# Patient Record
Sex: FEMALE | Race: WHITE | Employment: UNEMPLOYED | URBAN - METROPOLITAN AREA
[De-identification: names, ages, dates, MRNs, and addresses within clinical notes are randomized per-mention and may not be internally consistent; named-entity substitution may affect disease eponyms.]

---

## 2019-05-18 ENCOUNTER — HOSPITAL ENCOUNTER (EMERGENCY)
Age: 84
Discharge: HOME | End: 2019-05-18
Payer: MEDICARE

## 2019-05-18 ENCOUNTER — APPOINTMENT (OUTPATIENT)
Dept: CT IMAGING | Age: 84
End: 2019-05-18
Payer: MEDICARE

## 2019-05-18 VITALS
TEMPERATURE: 98.3 F | WEIGHT: 195 LBS | OXYGEN SATURATION: 96 % | HEIGHT: 63 IN | DIASTOLIC BLOOD PRESSURE: 84 MMHG | BODY MASS INDEX: 34.55 KG/M2 | HEART RATE: 91 BPM | RESPIRATION RATE: 18 BRPM | SYSTOLIC BLOOD PRESSURE: 122 MMHG

## 2019-05-18 DIAGNOSIS — E86.0 DEHYDRATION: ICD-10-CM

## 2019-05-18 DIAGNOSIS — R10.9 ABDOMINAL CRAMPING: Primary | ICD-10-CM

## 2019-05-18 LAB
A/G RATIO: 1.2 (ref 1.1–2.2)
ALBUMIN SERPL-MCNC: 4 G/DL (ref 3.4–5)
ALP BLD-CCNC: 69 U/L (ref 40–129)
ALT SERPL-CCNC: 9 U/L (ref 10–40)
ANION GAP SERPL CALCULATED.3IONS-SCNC: 13 MMOL/L (ref 3–16)
AST SERPL-CCNC: 12 U/L (ref 15–37)
BACTERIA: ABNORMAL /HPF
BASOPHILS ABSOLUTE: 0.1 K/UL (ref 0–0.2)
BASOPHILS RELATIVE PERCENT: 0.5 %
BILIRUB SERPL-MCNC: 1 MG/DL (ref 0–1)
BILIRUBIN URINE: NEGATIVE
BLOOD, URINE: ABNORMAL
BUN BLDV-MCNC: 24 MG/DL (ref 7–20)
CALCIUM SERPL-MCNC: 8.9 MG/DL (ref 8.3–10.6)
CHLORIDE BLD-SCNC: 101 MMOL/L (ref 99–110)
CLARITY: CLEAR
CO2: 21 MMOL/L (ref 21–32)
COLOR: ABNORMAL
CREAT SERPL-MCNC: 1.1 MG/DL (ref 0.6–1.2)
EOSINOPHILS ABSOLUTE: 0.1 K/UL (ref 0–0.6)
EOSINOPHILS RELATIVE PERCENT: 0.6 %
EPITHELIAL CELLS, UA: ABNORMAL /HPF
GFR AFRICAN AMERICAN: 57
GFR NON-AFRICAN AMERICAN: 47
GLOBULIN: 3.3 G/DL
GLUCOSE BLD-MCNC: 224 MG/DL (ref 70–99)
GLUCOSE URINE: 250 MG/DL
HCT VFR BLD CALC: 46.5 % (ref 36–48)
HEMOGLOBIN: 14.9 G/DL (ref 12–16)
KETONES, URINE: NEGATIVE MG/DL
LEUKOCYTE ESTERASE, URINE: NEGATIVE
LIPASE: 23 U/L (ref 13–60)
LYMPHOCYTES ABSOLUTE: 1.1 K/UL (ref 1–5.1)
LYMPHOCYTES RELATIVE PERCENT: 8.2 %
MCH RBC QN AUTO: 28.5 PG (ref 26–34)
MCHC RBC AUTO-ENTMCNC: 32.1 G/DL (ref 31–36)
MCV RBC AUTO: 88.9 FL (ref 80–100)
MICROSCOPIC EXAMINATION: YES
MONOCYTES ABSOLUTE: 0.8 K/UL (ref 0–1.3)
MONOCYTES RELATIVE PERCENT: 5.8 %
NEUTROPHILS ABSOLUTE: 11.4 K/UL (ref 1.7–7.7)
NEUTROPHILS RELATIVE PERCENT: 84.9 %
NITRITE, URINE: NEGATIVE
PDW BLD-RTO: 15.3 % (ref 12.4–15.4)
PH UA: 5.5 (ref 5–8)
PLATELET # BLD: 230 K/UL (ref 135–450)
PMV BLD AUTO: 8.3 FL (ref 5–10.5)
POTASSIUM SERPL-SCNC: 4.1 MMOL/L (ref 3.5–5.1)
PROTEIN UA: NEGATIVE MG/DL
RBC # BLD: 5.23 M/UL (ref 4–5.2)
RBC UA: ABNORMAL /HPF (ref 0–2)
SODIUM BLD-SCNC: 135 MMOL/L (ref 136–145)
SPECIFIC GRAVITY UA: <=1.005 (ref 1–1.03)
TOTAL PROTEIN: 7.3 G/DL (ref 6.4–8.2)
URINE TYPE: ABNORMAL
UROBILINOGEN, URINE: 0.2 E.U./DL
WBC # BLD: 13.5 K/UL (ref 4–11)
WBC UA: ABNORMAL /HPF (ref 0–5)

## 2019-05-18 PROCEDURE — 2580000003 HC RX 258: Performed by: PHYSICIAN ASSISTANT

## 2019-05-18 PROCEDURE — 74177 CT ABD & PELVIS W/CONTRAST: CPT

## 2019-05-18 PROCEDURE — 83690 ASSAY OF LIPASE: CPT

## 2019-05-18 PROCEDURE — 80053 COMPREHEN METABOLIC PANEL: CPT

## 2019-05-18 PROCEDURE — 96372 THER/PROPH/DIAG INJ SC/IM: CPT

## 2019-05-18 PROCEDURE — 6360000002 HC RX W HCPCS: Performed by: PHYSICIAN ASSISTANT

## 2019-05-18 PROCEDURE — 96361 HYDRATE IV INFUSION ADD-ON: CPT

## 2019-05-18 PROCEDURE — 85025 COMPLETE CBC W/AUTO DIFF WBC: CPT

## 2019-05-18 PROCEDURE — 81001 URINALYSIS AUTO W/SCOPE: CPT

## 2019-05-18 PROCEDURE — 96360 HYDRATION IV INFUSION INIT: CPT

## 2019-05-18 PROCEDURE — 6360000004 HC RX CONTRAST MEDICATION: Performed by: PHYSICIAN ASSISTANT

## 2019-05-18 PROCEDURE — 99284 EMERGENCY DEPT VISIT MOD MDM: CPT

## 2019-05-18 RX ORDER — DICYCLOMINE HYDROCHLORIDE 10 MG/1
10 CAPSULE ORAL
Qty: 40 CAPSULE | Refills: 0 | Status: SHIPPED | OUTPATIENT
Start: 2019-05-18

## 2019-05-18 RX ORDER — DICYCLOMINE HYDROCHLORIDE 10 MG/ML
20 INJECTION INTRAMUSCULAR ONCE
Status: COMPLETED | OUTPATIENT
Start: 2019-05-18 | End: 2019-05-18

## 2019-05-18 RX ORDER — 0.9 % SODIUM CHLORIDE 0.9 %
1000 INTRAVENOUS SOLUTION INTRAVENOUS ONCE
Status: COMPLETED | OUTPATIENT
Start: 2019-05-18 | End: 2019-05-18

## 2019-05-18 RX ADMIN — IOPAMIDOL 75 ML: 755 INJECTION, SOLUTION INTRAVENOUS at 11:54

## 2019-05-18 RX ADMIN — SODIUM CHLORIDE 1000 ML: 9 INJECTION, SOLUTION INTRAVENOUS at 11:08

## 2019-05-18 RX ADMIN — DICYCLOMINE HYDROCHLORIDE 20 MG: 20 INJECTION, SOLUTION INTRAMUSCULAR at 11:08

## 2019-05-18 ASSESSMENT — PAIN DESCRIPTION - LOCATION: LOCATION: ABDOMEN

## 2019-05-18 ASSESSMENT — PAIN SCALES - GENERAL: PAINLEVEL_OUTOF10: 3

## 2019-05-18 ASSESSMENT — PAIN DESCRIPTION - PAIN TYPE: TYPE: ACUTE PAIN

## 2019-05-18 NOTE — ED PROVIDER NOTES
Social Needs    Financial resource strain: Not on file    Food insecurity:     Worry: Not on file     Inability: Not on file    Transportation needs:     Medical: Not on file     Non-medical: Not on file   Tobacco Use    Smoking status: Never Smoker    Smokeless tobacco: Never Used   Substance and Sexual Activity    Alcohol use: No    Drug use: No    Sexual activity: Not Currently     Partners: Male   Lifestyle    Physical activity:     Days per week: Not on file     Minutes per session: Not on file    Stress: Not on file   Relationships    Social connections:     Talks on phone: Not on file     Gets together: Not on file     Attends Shinto service: Not on file     Active member of club or organization: Not on file     Attends meetings of clubs or organizations: Not on file     Relationship status: Not on file    Intimate partner violence:     Fear of current or ex partner: Not on file     Emotionally abused: Not on file     Physically abused: Not on file     Forced sexual activity: Not on file   Other Topics Concern    Not on file   Social History Narrative    Not on file     Current Facility-Administered Medications   Medication Dose Route Frequency Provider Last Rate Last Dose    0.9 % sodium chloride bolus  1,000 mL Intravenous Once Gavi Lemon, [address]        dicyclomine (BENTYL) injection 20 mg  20 mg Intramuscular Once Gavi Lemon, [address]         Current Outpatient Medications   Medication Sig Dispense Refill    glimepiride (AMARYL) 4 MG tablet       metFORMIN (GLUCOPHAGE) 1000 MG tablet       glimepiride (AMARYL) 4 MG tablet Take 4 mg by mouth      metFORMIN (GLUCOPHAGE) 1000 MG tablet Take 1,000 mg by mouth      DULoxetine (CYMBALTA) 60 MG capsule Take 60 mg by mouth      metFORMIN (GLUCOPHAGE) 500 MG tablet Take 1,000 mg by mouth      DULoxetine (CYMBALTA) 60 MG capsule Take 60 mg by mouth      DULoxetine (CYMBALTA) 60 MG capsule   5    magnesium oxide (MAG-OX) 400 (241.3 MG) MG TABS tablet   5    promethazine (PHENERGAN) 12.5 MG tablet   0    Magnesium Oxide, Antacid, 500 MG CAPS Take 400 mg by mouth      glucose blood VI test strips (ASCENSIA AUTODISC VI;ONE TOUCH ULTRA TEST VI) strip USE TO TEST YOUR BLOOD GLUCOSE ONCE DAILY      atorvastatin (LIPITOR) 20 MG tablet Take 20 mg by mouth.  glimepiride (AMARYL) 2 MG tablet Take 2 mg by mouth.  lisinopril (ZESTRIL) 40 MG tablet Take 40 mg by mouth.  metFORMIN (GLUCOPHAGE) 500 MG tablet Take 1,000 mg by mouth.  naproxen (NAPROSYN) 375 MG tablet Take 375 mg by mouth.  atorvastatin (LIPITOR) 20 MG tablet Take 1 tablet by mouth  daily      glimepiride (AMARYL) 2 MG tablet Take 1 tablet by mouth  daily      lisinopril (PRINIVIL;ZESTRIL) 40 MG tablet Take 40 mg by mouth.  metFORMIN (GLUCOPHAGE) 500 MG tablet Take 1,000 mg by mouth.  naproxen (NAPROSYN) 375 MG tablet Take 375 mg by mouth.  atorvastatin (LIPITOR) 20 MG tablet Take 20 mg by mouth daily.  glimepiride (AMARYL) 2 MG tablet Take 2 mg by mouth every morning (before breakfast).  lisinopril (PRINIVIL;ZESTRIL) 40 MG tablet Take 40 mg by mouth daily.  metFORMIN (GLUCOPHAGE) 500 MG tablet Take 500 mg by mouth 2 times daily (with meals).  naproxen (NAPROSYN) 375 MG tablet Take 375 mg by mouth 2 times daily (with meals). No Known Allergies    REVIEW OF SYSTEMS  10 systems reviewed, pertinent positives per HPI otherwise noted to be negative    PHYSICAL EXAM  /74   Pulse 55   Temp 98.3 °F (36.8 °C) (Oral)   Resp 18   Ht 5' 3\" (1.6 m)   Wt 195 lb (88.5 kg)   SpO2 96%   BMI 34.54 kg/m²   GENERAL APPEARANCE: Awake and alert. Cooperative. No acute distress. HEAD: Normocephalic. Atraumatic. EYES: PERRL. EOM's grossly intact. ENT: Mucous membranes are moist.   NECK: Supple. HEART: RRR. No murmurs. LUNGS: Respirations unlabored. CTAB. Good air exchange. Speaking comfortably in full sentences. ABDOMEN: Soft. Non-distended. Generalized nonfocal abdominal tenderness without rigidity, No guarding or rebound. Negative Juarez's, McBurney's and Rovsing's. No fluid wave or ascites. No hernias or masses. Bowel sounds, no quadrants. No CVA tenderness. EXTREMITIES: No peripheral edema. Moves all extremities equally. All extremities neurovascularly intact. SKIN: Warm and dry. No acute rashes. NEUROLOGICAL: Alert and oriented. CN's 2-12 intact. No gross facial drooping. Strength 5/5, sensation intact. PSYCHIATRIC: Normal mood and affect. RADIOLOGY  Ct Abdomen Pelvis W Iv Contrast Additional Contrast? None    Result Date: 5/18/2019  EXAMINATION: CT OF THE ABDOMEN AND PELVIS WITH CONTRAST 5/18/2019 11:49 am TECHNIQUE: CT of the abdomen and pelvis was performed with the administration of intravenous contrast. Multiplanar reformatted images are provided for review. Dose modulation, iterative reconstruction, and/or weight based adjustment of the mA/kV was utilized to reduce the radiation dose to as low as reasonably achievable. COMPARISON: None. HISTORY: ORDERING SYSTEM PROVIDED HISTORY: abd pain TECHNOLOGIST PROVIDED HISTORY: Additional Contrast?->None Ordering Physician Provided Reason for Exam: s/p colonoscopy yesterdy. Pt is now having severe lower abd cramping Acuity: Acute Type of Exam: Initial FINDINGS: Lower Chest: Minimal lung scarring. No pericardial or pleural effusions. Organs: Liver, portal vein, pancreas, spleen and adrenal glands all appear unremarkable. Gallbladder has been removed. A cyst is noted involving the superior pole of the right kidney. No enhancing renal mass or hydronephrosis. Abdominal aorta demonstrates moderate calcification without aneurysm. GI/Bowel: The stomach is grossly unremarkable. Small bowel appears nondilated. Appendix is seen and appears normal.  Sigmoid diverticulosis. Descending colon diverticulosis. No acute colonic abnormality.  Pelvis: Urinary bladder is grossly unremarkable. Uterus appears atrophic. No adnexal mass. Peritoneum/Retroperitoneum: No free air. No free fluid. No lymphadenopathy. Bones/Soft Tissues: Abdominal wall demonstrates no acute findings. Osseous structures demonstrate degenerative changes with mild anterolisthesis of L4 on L5. There appears to be associated posterior calcified disc material within the spinal canal at this level. *No definitive acute abnormality. *Colonic diverticulosis. *Degenerative changes of the visualized osseous structures with mild anterolisthesis of L4 on L5. There appears to be posterior calcified disc material within the spinal canal at this level. Findings can be further evaluated by MRI if necessary. ED COURSE   I have evaluated this patient. Attending physician was available for consultation. Patient received Bentyl IM for pain, with good relief. Triage vital stable. Patient given 1 L IV fluid bolus. Urinalysis 6-10 white blood cells, 3-5 epi cells and rare bacteria. CBC with mild leukocytosis. CMP consistent with mild dehydration with decreased sodium levels. Patient was given a 1 L IV fluid bolus. Lipase normal.  CT abdomen and pelvis without evidence to suggest post procedural complication. Patient is feeling better on reevaluation. Will be discharged home with recommendations for oral hydration and close and continued GI follow-up. Also discussed return precautions and patient in agreement and comfortable discharge. A discussion was had with Mrs. Valdezregarding abdominal discomfort and cramping, ED findings, recommendations for follow-up. All questions were answered. Patient will follow up with  PCP and GI within 1 week for further evaluation/treatment. Patient will return to ED for new/worsening symptoms. Patient was sent home with a prescription for Bentyl .     MDM  Results for orders placed or performed during the hospital encounter of 05/18/19   CBC auto differential   Result Value Ref Range    WBC 13.5 (H) 4.0 - 11.0 K/uL    RBC 5.23 (H) 4.00 - 5.20 M/uL    Hemoglobin 14.9 12.0 - 16.0 g/dL    Hematocrit 46.5 36.0 - 48.0 %    MCV 88.9 80.0 - 100.0 fL    MCH 28.5 26.0 - 34.0 pg    MCHC 32.1 31.0 - 36.0 g/dL    RDW 15.3 12.4 - 15.4 %    Platelets 677 830 - 856 K/uL    MPV 8.3 5.0 - 10.5 fL    Neutrophils % 84.9 %    Lymphocytes % 8.2 %    Monocytes % 5.8 %    Eosinophils % 0.6 %    Basophils % 0.5 %    Neutrophils # 11.4 (H) 1.7 - 7.7 K/uL    Lymphocytes # 1.1 1.0 - 5.1 K/uL    Monocytes # 0.8 0.0 - 1.3 K/uL    Eosinophils # 0.1 0.0 - 0.6 K/uL    Basophils # 0.1 0.0 - 0.2 K/uL   Comprehensive Metabolic Panel   Result Value Ref Range    Sodium 135 (L) 136 - 145 mmol/L    Potassium 4.1 3.5 - 5.1 mmol/L    Chloride 101 99 - 110 mmol/L    CO2 21 21 - 32 mmol/L    Anion Gap 13 3 - 16    Glucose 224 (H) 70 - 99 mg/dL    BUN 24 (H) 7 - 20 mg/dL    CREATININE 1.1 0.6 - 1.2 mg/dL    GFR Non- 47 (A) >60    GFR  57 (A) >60    Calcium 8.9 8.3 - 10.6 mg/dL    Total Protein 7.3 6.4 - 8.2 g/dL    Alb 4.0 3.4 - 5.0 g/dL    Albumin/Globulin Ratio 1.2 1.1 - 2.2    Total Bilirubin 1.0 0.0 - 1.0 mg/dL    Alkaline Phosphatase 69 40 - 129 U/L    ALT 9 (L) 10 - 40 U/L    AST 12 (L) 15 - 37 U/L    Globulin 3.3 g/dL   Lipase   Result Value Ref Range    Lipase 23.0 13.0 - 60.0 U/L   Urinalysis   Result Value Ref Range    Color, UA Straw Straw/Yellow    Clarity, UA Clear Clear    Glucose, Ur 250 (A) Negative mg/dL    Bilirubin Urine Negative Negative    Ketones, Urine Negative Negative mg/dL    Specific Gravity, UA <=1.005 1.005 - 1.030    Blood, Urine TRACE-LYSED (A) Negative    pH, UA 5.5 5.0 - 8.0    Protein, UA Negative Negative mg/dL    Urobilinogen, Urine 0.2 <2.0 E.U./dL    Nitrite, Urine Negative Negative    Leukocyte Esterase, Urine Negative Negative    Microscopic Examination YES     Urine Type Not Specified    Microscopic Urinalysis   Result Value Ref Range    WBC, UA 6-10 (A) 0 - 5 /HPF    RBC, UA 3-5 (A) 0 - 2 /HPF    Epi Cells 3-5 /HPF    Bacteria, UA Rare (A) /HPF     I estimate there is LOW risk for ACUTE APPENDICITIS, BOWEL OBSTRUCTION, CHOLECYSTITIS, DIVERTICULITIS, INCARCERATED HERNIA, PANCREATITIS, or PERFORATED BOWEL or ULCER, thus I consider the discharge disposition reasonable. Also, there is no evidence or peritonitis, sepsis, or toxicity. Roosevelt Lund and I have discussed the diagnosis and risks, and we agree with discharging home to follow-up with their primary doctor. We also discussed returning to the Emergency Department immediately if new or worsening symptoms occur. We have discussed the symptoms which are most concerning (e.g., bloody stool, fever, changing or worsening pain, vomiting) that necessitate immediate return. FINAL Impression  1. Abdominal cramping    2. Dehydration      Blood pressure 122/84, pulse 91, temperature 98.3 °F (36.8 °C), temperature source Oral, resp. rate 18, height 5' 3\" (1.6 m), weight 195 lb (88.5 kg), SpO2 96 %. DISPOSITION  Patient was discharged to home in good condition.          Tori [place]  05/18/19 [id number]

## 2024-03-30 ENCOUNTER — HOSPITAL ENCOUNTER (INPATIENT)
Age: 89
LOS: 1 days | Discharge: NURSING, REHAB OR LONG-TERM CARE | DRG: 064 | End: 2024-04-03
Attending: EMERGENCY MEDICINE | Admitting: INTERNAL MEDICINE
Payer: MEDICARE

## 2024-03-30 ENCOUNTER — APPOINTMENT (OUTPATIENT)
Dept: CT IMAGING | Age: 89
DRG: 064 | End: 2024-03-30
Payer: MEDICARE

## 2024-03-30 ENCOUNTER — APPOINTMENT (OUTPATIENT)
Dept: GENERAL RADIOLOGY | Age: 89
DRG: 064 | End: 2024-03-30
Payer: MEDICARE

## 2024-03-30 DIAGNOSIS — R79.89 ELEVATED TROPONIN: ICD-10-CM

## 2024-03-30 DIAGNOSIS — S09.90XA INJURY OF HEAD, INITIAL ENCOUNTER: Primary | ICD-10-CM

## 2024-03-30 DIAGNOSIS — R42 DIZZINESS: ICD-10-CM

## 2024-03-30 DIAGNOSIS — Z79.01 ANTICOAGULATED BY ANTICOAGULATION TREATMENT: ICD-10-CM
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
## 2024-03-30 PROBLEM — R29.6 FREQUENT FALLS: Status: ACTIVE | Noted: 2024-03-30

## 2024-03-30 LAB
ALBUMIN SERPL-MCNC: 3.8 G/DL (ref 3.4–5)
ALBUMIN/GLOB SERPL: 1.4 {RATIO} (ref 1.1–2.2)
ALP SERPL-CCNC: 60 U/L (ref 40–129)
ALT SERPL-CCNC: 23 U/L (ref 10–40)
ANION GAP SERPL CALCULATED.3IONS-SCNC: 12 MMOL/L (ref 3–16)
APTT BLD: 36.3 SEC (ref 22.7–35.9)
AST SERPL-CCNC: 22 U/L (ref 15–37)
BACTERIA URNS QL MICRO: ABNORMAL /HPF
BASOPHILS # BLD: 0 K/UL (ref 0–0.2)
BASOPHILS NFR BLD: 0.7 %
BILIRUB SERPL-MCNC: 0.8 MG/DL (ref 0–1)
BILIRUB UR QL STRIP.AUTO: NEGATIVE
BUN SERPL-MCNC: 18 MG/DL (ref 7–20)
CALCIUM SERPL-MCNC: 8.9 MG/DL (ref 8.3–10.6)
CHLORIDE SERPL-SCNC: 97 MMOL/L (ref 99–110)
CLARITY UR: CLEAR
CO2 SERPL-SCNC: 27 MMOL/L (ref 21–32)
COLOR UR: YELLOW
CREAT SERPL-MCNC: 1 MG/DL (ref 0.6–1.2)
DEPRECATED RDW RBC AUTO: 16.3 % (ref 12.4–15.4)
EOSINOPHIL # BLD: 0.2 K/UL (ref 0–0.6)
EOSINOPHIL NFR BLD: 3.5 %
GFR SERPLBLD CREATININE-BSD FMLA CKD-EPI: 54 ML/MIN/{1.73_M2}
GLUCOSE BLD-MCNC: 130 MG/DL (ref 70–99)
GLUCOSE BLD-MCNC: 250 MG/DL (ref 70–99)
GLUCOSE SERPL-MCNC: 174 MG/DL (ref 70–99)
GLUCOSE UR STRIP.AUTO-MCNC: 250 MG/DL
HCT VFR BLD AUTO: 41.6 % (ref 36–48)
HGB BLD-MCNC: 13.6 G/DL (ref 12–16)
HGB UR QL STRIP.AUTO: NEGATIVE
INR PPP: 1.24 (ref 0.84–1.16)
KETONES UR STRIP.AUTO-MCNC: NEGATIVE MG/DL
LEUKOCYTE ESTERASE UR QL STRIP.AUTO: ABNORMAL
LYMPHOCYTES # BLD: 0.9 K/UL (ref 1–5.1)
LYMPHOCYTES NFR BLD: 12.5 %
MCH RBC QN AUTO: 30.1 PG (ref 26–34)
MCHC RBC AUTO-ENTMCNC: 32.7 G/DL (ref 31–36)
MCV RBC AUTO: 92.2 FL (ref 80–100)
MONOCYTES # BLD: 0.4 K/UL (ref 0–1.3)
MONOCYTES NFR BLD: 6 %
NEUTROPHILS # BLD: 5.4 K/UL (ref 1.7–7.7)
NEUTROPHILS NFR BLD: 77.3 %
NITRITE UR QL STRIP.AUTO: NEGATIVE
PERFORMED ON: ABNORMAL
PERFORMED ON: ABNORMAL
PH UR STRIP.AUTO: 6 [PH] (ref 5–8)
PLATELET # BLD AUTO: 252 K/UL (ref 135–450)
PMV BLD AUTO: 7.7 FL (ref 5–10.5)
POTASSIUM SERPL-SCNC: 4.7 MMOL/L (ref 3.5–5.1)
PROT SERPL-MCNC: 6.6 G/DL (ref 6.4–8.2)
PROT UR STRIP.AUTO-MCNC: NEGATIVE MG/DL
PROTHROMBIN TIME: 15.6 SEC (ref 11.5–14.8)
RBC # BLD AUTO: 4.51 M/UL (ref 4–5.2)
RBC #/AREA URNS HPF: ABNORMAL /HPF (ref 0–4)
SODIUM SERPL-SCNC: 136 MMOL/L (ref 136–145)
SP GR UR STRIP.AUTO: 1.02 (ref 1–1.03)
TROPONIN, HIGH SENSITIVITY: 31 NG/L (ref 0–14)
TROPONIN, HIGH SENSITIVITY: 31 NG/L (ref 0–14)
TROPONIN, HIGH SENSITIVITY: 32 NG/L (ref 0–14)
UA COMPLETE W REFLEX CULTURE PNL UR: ABNORMAL
UA DIPSTICK W REFLEX MICRO PNL UR: YES
URN SPEC COLLECT METH UR: ABNORMAL
UROBILINOGEN UR STRIP-ACNC: 0.2 E.U./DL
WBC # BLD AUTO: 7 K/UL (ref 4–11)
WBC #/AREA URNS HPF: ABNORMAL /HPF (ref 0–5)

## 2024-03-30 PROCEDURE — 80053 COMPREHEN METABOLIC PANEL: CPT

## 2024-03-30 PROCEDURE — 85610 PROTHROMBIN TIME: CPT

## 2024-03-30 PROCEDURE — 70498 CT ANGIOGRAPHY NECK: CPT

## 2024-03-30 PROCEDURE — 6370000000 HC RX 637 (ALT 250 FOR IP): Performed by: PHYSICIAN ASSISTANT

## 2024-03-30 PROCEDURE — 81001 URINALYSIS AUTO W/SCOPE: CPT

## 2024-03-30 PROCEDURE — 2580000003 HC RX 258: Performed by: HOSPITALIST

## 2024-03-30 PROCEDURE — 93005 ELECTROCARDIOGRAM TRACING: CPT | Performed by: PHYSICIAN ASSISTANT

## 2024-03-30 PROCEDURE — 51798 US URINE CAPACITY MEASURE: CPT

## 2024-03-30 PROCEDURE — 96361 HYDRATE IV INFUSION ADD-ON: CPT

## 2024-03-30 PROCEDURE — 51701 INSERT BLADDER CATHETER: CPT

## 2024-03-30 PROCEDURE — 96375 TX/PRO/DX INJ NEW DRUG ADDON: CPT

## 2024-03-30 PROCEDURE — G0378 HOSPITAL OBSERVATION PER HR: HCPCS

## 2024-03-30 PROCEDURE — 71045 X-RAY EXAM CHEST 1 VIEW: CPT

## 2024-03-30 PROCEDURE — 6370000000 HC RX 637 (ALT 250 FOR IP): Performed by: HOSPITALIST

## 2024-03-30 PROCEDURE — 72125 CT NECK SPINE W/O DYE: CPT

## 2024-03-30 PROCEDURE — 70450 CT HEAD/BRAIN W/O DYE: CPT

## 2024-03-30 PROCEDURE — 85730 THROMBOPLASTIN TIME PARTIAL: CPT

## 2024-03-30 PROCEDURE — 6360000004 HC RX CONTRAST MEDICATION: Performed by: PHYSICIAN ASSISTANT

## 2024-03-30 PROCEDURE — 85025 COMPLETE CBC W/AUTO DIFF WBC: CPT

## 2024-03-30 PROCEDURE — 84484 ASSAY OF TROPONIN QUANT: CPT

## 2024-03-30 PROCEDURE — 99285 EMERGENCY DEPT VISIT HI MDM: CPT

## 2024-03-30 PROCEDURE — 36415 COLL VENOUS BLD VENIPUNCTURE: CPT

## 2024-03-30 RX ORDER — METOPROLOL SUCCINATE 25 MG/1
25 TABLET, EXTENDED RELEASE ORAL DAILY
Status: DISCONTINUED | OUTPATIENT
Start: 2024-03-31 | End: 2024-04-03 | Stop reason: HOSPADM

## 2024-03-30 RX ORDER — INSULIN LISPRO 100 [IU]/ML
0-4 INJECTION, SOLUTION INTRAVENOUS; SUBCUTANEOUS NIGHTLY
Status: DISCONTINUED | OUTPATIENT
Start: 2024-03-30 | End: 2024-04-03 | Stop reason: HOSPADM

## 2024-03-30 RX ORDER — ENOXAPARIN SODIUM 100 MG/ML
40 INJECTION SUBCUTANEOUS DAILY
Status: DISCONTINUED | OUTPATIENT
Start: 2024-03-30 | End: 2024-03-30

## 2024-03-30 RX ORDER — SODIUM CHLORIDE 9 MG/ML
INJECTION, SOLUTION INTRAVENOUS CONTINUOUS
Status: DISCONTINUED | OUTPATIENT
Start: 2024-03-30 | End: 2024-03-31

## 2024-03-30 RX ORDER — BUPROPION HYDROCHLORIDE 150 MG/1
300 TABLET ORAL EVERY MORNING
Status: DISCONTINUED | OUTPATIENT
Start: 2024-03-31 | End: 2024-04-03 | Stop reason: HOSPADM

## 2024-03-30 RX ORDER — METOPROLOL SUCCINATE 25 MG/1
25 TABLET, EXTENDED RELEASE ORAL DAILY
Status: ON HOLD | COMMUNITY

## 2024-03-30 RX ORDER — SODIUM CHLORIDE 9 MG/ML
INJECTION, SOLUTION INTRAVENOUS PRN
Status: DISCONTINUED | OUTPATIENT
Start: 2024-03-30 | End: 2024-04-03 | Stop reason: HOSPADM

## 2024-03-30 RX ORDER — POTASSIUM CHLORIDE 7.45 MG/ML
10 INJECTION INTRAVENOUS PRN
Status: DISCONTINUED | OUTPATIENT
Start: 2024-03-30 | End: 2024-04-03 | Stop reason: HOSPADM

## 2024-03-30 RX ORDER — ACETAMINOPHEN 500 MG
1000 TABLET ORAL ONCE
Status: COMPLETED | OUTPATIENT
Start: 2024-03-30 | End: 2024-03-30

## 2024-03-30 RX ORDER — POLYETHYLENE GLYCOL 3350 17 G/17G
17 POWDER, FOR SOLUTION ORAL DAILY PRN
Status: DISCONTINUED | OUTPATIENT
Start: 2024-03-30 | End: 2024-04-03 | Stop reason: HOSPADM

## 2024-03-30 RX ORDER — ALENDRONATE SODIUM 70 MG/1
70 TABLET ORAL
Status: ON HOLD | COMMUNITY

## 2024-03-30 RX ORDER — ONDANSETRON 2 MG/ML
4 INJECTION INTRAMUSCULAR; INTRAVENOUS EVERY 6 HOURS PRN
Status: DISCONTINUED | OUTPATIENT
Start: 2024-03-30 | End: 2024-04-03 | Stop reason: HOSPADM

## 2024-03-30 RX ORDER — ACETAMINOPHEN 325 MG/1
650 TABLET ORAL EVERY 6 HOURS PRN
Status: DISCONTINUED | OUTPATIENT
Start: 2024-03-30 | End: 2024-04-03 | Stop reason: HOSPADM

## 2024-03-30 RX ORDER — MAGNESIUM SULFATE IN WATER 40 MG/ML
2000 INJECTION, SOLUTION INTRAVENOUS PRN
Status: DISCONTINUED | OUTPATIENT
Start: 2024-03-30 | End: 2024-04-03 | Stop reason: HOSPADM

## 2024-03-30 RX ORDER — DEXTROSE MONOHYDRATE 100 MG/ML
INJECTION, SOLUTION INTRAVENOUS CONTINUOUS PRN
Status: DISCONTINUED | OUTPATIENT
Start: 2024-03-30 | End: 2024-04-03 | Stop reason: HOSPADM

## 2024-03-30 RX ORDER — INSULIN GLARGINE 100 [IU]/ML
10 INJECTION, SOLUTION SUBCUTANEOUS NIGHTLY
Status: ON HOLD | COMMUNITY

## 2024-03-30 RX ORDER — SODIUM CHLORIDE 0.9 % (FLUSH) 0.9 %
5-40 SYRINGE (ML) INJECTION EVERY 12 HOURS SCHEDULED
Status: DISCONTINUED | OUTPATIENT
Start: 2024-03-30 | End: 2024-04-03 | Stop reason: HOSPADM

## 2024-03-30 RX ORDER — ATORVASTATIN CALCIUM 10 MG/1
20 TABLET, FILM COATED ORAL DAILY
Status: DISCONTINUED | OUTPATIENT
Start: 2024-03-30 | End: 2024-04-03 | Stop reason: HOSPADM

## 2024-03-30 RX ORDER — INSULIN ASPART 100 [IU]/ML
INJECTION, SUSPENSION SUBCUTANEOUS 2 TIMES DAILY WITH MEALS
Status: ON HOLD | COMMUNITY
End: 2024-04-03 | Stop reason: HOSPADM

## 2024-03-30 RX ORDER — ALBUTEROL SULFATE 90 UG/1
2 AEROSOL, METERED RESPIRATORY (INHALATION) EVERY 6 HOURS PRN
Status: ON HOLD | COMMUNITY

## 2024-03-30 RX ORDER — MECLIZINE HCL 12.5 MG/1
25 TABLET ORAL ONCE
Status: COMPLETED | OUTPATIENT
Start: 2024-03-30 | End: 2024-03-30

## 2024-03-30 RX ORDER — LANOLIN ALCOHOL/MO/W.PET/CERES
3 CREAM (GRAM) TOPICAL DAILY
Status: ON HOLD | COMMUNITY

## 2024-03-30 RX ORDER — DULOXETIN HYDROCHLORIDE 60 MG/1
60 CAPSULE, DELAYED RELEASE ORAL DAILY
Status: DISCONTINUED | OUTPATIENT
Start: 2024-03-31 | End: 2024-04-03 | Stop reason: HOSPADM

## 2024-03-30 RX ORDER — LANOLIN ALCOHOL/MO/W.PET/CERES
3 CREAM (GRAM) TOPICAL NIGHTLY PRN
Status: DISCONTINUED | OUTPATIENT
Start: 2024-03-30 | End: 2024-04-03 | Stop reason: HOSPADM

## 2024-03-30 RX ORDER — ACETAMINOPHEN 500 MG
1000 TABLET ORAL EVERY 6 HOURS PRN
Status: ON HOLD | COMMUNITY

## 2024-03-30 RX ORDER — PHENOL 1.4 %
1 AEROSOL, SPRAY (ML) MUCOUS MEMBRANE
Status: ON HOLD | COMMUNITY

## 2024-03-30 RX ORDER — SODIUM CHLORIDE 0.9 % (FLUSH) 0.9 %
5-40 SYRINGE (ML) INJECTION PRN
Status: DISCONTINUED | OUTPATIENT
Start: 2024-03-30 | End: 2024-04-03 | Stop reason: HOSPADM

## 2024-03-30 RX ORDER — GLUCAGON 1 MG/ML
1 KIT INJECTION PRN
Status: DISCONTINUED | OUTPATIENT
Start: 2024-03-30 | End: 2024-04-03 | Stop reason: HOSPADM

## 2024-03-30 RX ORDER — BUPROPION HYDROCHLORIDE 300 MG/1
300 TABLET ORAL EVERY MORNING
Status: ON HOLD | COMMUNITY

## 2024-03-30 RX ORDER — POTASSIUM CHLORIDE 20 MEQ/1
40 TABLET, EXTENDED RELEASE ORAL PRN
Status: DISCONTINUED | OUTPATIENT
Start: 2024-03-30 | End: 2024-04-03 | Stop reason: HOSPADM

## 2024-03-30 RX ORDER — POLYETHYLENE GLYCOL 3350 17 G/17G
17 POWDER, FOR SOLUTION ORAL DAILY
Status: ON HOLD | COMMUNITY

## 2024-03-30 RX ORDER — ONDANSETRON 4 MG/1
4 TABLET, ORALLY DISINTEGRATING ORAL EVERY 8 HOURS PRN
Status: DISCONTINUED | OUTPATIENT
Start: 2024-03-30 | End: 2024-04-03 | Stop reason: HOSPADM

## 2024-03-30 RX ORDER — ACETAMINOPHEN 650 MG/1
650 SUPPOSITORY RECTAL EVERY 6 HOURS PRN
Status: DISCONTINUED | OUTPATIENT
Start: 2024-03-30 | End: 2024-04-03 | Stop reason: HOSPADM

## 2024-03-30 RX ORDER — INSULIN LISPRO 100 [IU]/ML
0-8 INJECTION, SOLUTION INTRAVENOUS; SUBCUTANEOUS
Status: DISCONTINUED | OUTPATIENT
Start: 2024-03-30 | End: 2024-04-03 | Stop reason: HOSPADM

## 2024-03-30 RX ADMIN — ACETAMINOPHEN 1000 MG: 500 TABLET ORAL at 13:37

## 2024-03-30 RX ADMIN — IOPAMIDOL 75 ML: 755 INJECTION, SOLUTION INTRAVENOUS at 14:59

## 2024-03-30 RX ADMIN — APIXABAN 2.5 MG: 2.5 TABLET, FILM COATED ORAL at 21:28

## 2024-03-30 RX ADMIN — MECLIZINE 25 MG: 12.5 TABLET ORAL at 14:35

## 2024-03-30 RX ADMIN — SODIUM CHLORIDE: 9 INJECTION, SOLUTION INTRAVENOUS at 18:24

## 2024-03-30 ASSESSMENT — PAIN - FUNCTIONAL ASSESSMENT: PAIN_FUNCTIONAL_ASSESSMENT: 0-10

## 2024-03-30 ASSESSMENT — PAIN SCALES - GENERAL
PAINLEVEL_OUTOF10: 1
PAINLEVEL_OUTOF10: 1

## 2024-03-30 ASSESSMENT — PAIN DESCRIPTION - ORIENTATION: ORIENTATION: POSTERIOR

## 2024-03-30 ASSESSMENT — PAIN DESCRIPTION - LOCATION: LOCATION: HEAD

## 2024-03-30 NOTE — H&P
V2.0  History and Physical      Name:  Maria E Valdez /Age/Sex: 8/3/1934  (89 y.o. female)   MRN & CSN:  [id number] & [id number] Encounter Date/Time: 3/30/2024 5:04 PM EDT   Location:  [facility]- PCP: Moises Winslow MD       Hospital Day: 1    Assessment and Plan:       Hospital Problems             Last Modified POA    * (Principal) Dizziness 3/30/2024 Yes    Elevated troponin 3/30/2024 Yes    Frequent falls 3/30/2024 Yes    Hypertension 3/30/2024 Yes    Controlled type 2 diabetes mellitus with hyperglycemia, with long-term current use of insulin (HCC) 3/30/2024 Yes    Diabetic polyneuropathy (HCC) 3/30/2024 Yes    Type 2 diabetes mellitus with diabetic polyneuropathy (HCC) 3/30/2024 Yes    Osteoarthritis 3/30/2024 Yes    Chronic obstructive pulmonary disease (HCC) 3/30/2024 Yes    Overview Signed 2015  1:49 PM by Judy Valentine MA     Overview:   Mild by pfts 10/10            Plan:  Admit to observation status for further management  Telemetry  Fall precaution  Vitals per protocol  Orthostatic vitals ordered  Accu-Chek/sliding scale insulin ordered  Continue some of the home meds including Eliquis  Cardiology consulted for further evaluation  PT/OT consulted  Monitor lab, trend troponin  Full code  Further management based on hospital course and consultant recommendations    Disposition:   Current Living situation: Home  Expected Disposition: Home  Estimated D/C: 2 days    Diet ADULT DIET; Regular; 4 carb choices (60 gm/meal)   DVT Prophylaxis [] Lovenox, []  Heparin, [] SCDs, [] Ambulation,  [x] Eliquis, [] Xarelto, [] Coumadin   Code Status Full Code   Surrogate Decision Maker/ POA yes     Personally reviewed Lab Studies and Imaging     Discussed management of the case with patient verbalized understanding        History from:     patient    History of Present Illness:     Chief Complaint: Dizziness/fall  Maria E Valdez is a 89 y.o. female with pmh of essential hypertension, neurodegenerative  TISSUES/SKULL: The calvarium is intact.  Note is made of a small scalp hematoma overlying the vertex of the skull.     No acute intracranial abnormality.         Electronically signed by Zachary Harmon MD on 3/30/2024 at 6:09 PM

## 2024-03-30 NOTE — CONSULTS
Consult Placed     Who: HOWARD CATHERINE   Date:3/30/24  Time:0558     Electronically signed by Ankit Yoon on 3/30/2024 at 5:57 PM

## 2024-03-30 NOTE — ED NOTES
Isaak Cummings and I went in to ambulate patient. As she sat up in bed she started staring off towards the ceiling stating she was getting dizzy and began laying back. Zoila and I put her back in bed, connected her back to monitor. Wallace RATLIFF updated of all.

## 2024-03-30 NOTE — ED PROVIDER NOTES
[facility]  ED  EMERGENCY DEPARTMENT ENCOUNTER        Pt Name: Maria E Vadlez  MRN: [id number]  Birthdate 8/3/1934  Date of evaluation: 3/30/2024  Provider: Wallace Ac PA-C  PCP: Moises Winslow MD  Note Started: 1:58 PM EDT 3/30/24      Patient was seen in conjunction with attending physician ARPIT KO       CHIEF COMPLAINT       Chief Complaint   Patient presents with    Fall     Patient comes from [place], got dizzy and fell from standing height. Hit head on woodwork in the kitchen on the wall. No LOC, on blood thinners. Contusion to back of head.       HISTORY OF PRESENT ILLNESS: 1 or more Elements     History From: patient  Limitations to history : None    Maria E Valdez is a 89 y.o. female who presents to the emergency department with a chief complaint of head injury.  She states she has minor headache where she hit her head at 1 out of 10.  She is anticoagulated on Eliquis.  She states she was turning to put the door stop on her door when she got dizzy and lost her balance falling hitting the door frame.  She is been able to ambulate since this happened.  No longer feeling dizzy.  Denies neck pain, back pain, difficulty moving her extremities, chest pain, abdominal pain, bleeding, visual change or any other symptoms.    Nursing Notes were all reviewed and agreed with or any disagreements were addressed in the HPI.    REVIEW OF SYSTEMS :      Review of Systems    Positives and Pertinent negatives as per HPI.     SURGICAL HISTORY     Past Surgical History:   Procedure Laterality Date    KNEE SURGERY         CURRENTMEDICATIONS       Previous Medications    ACETAMINOPHEN (TYLENOL) 500 MG TABLET    Take 2 tablets by mouth every 6 hours as needed for Pain    ALBUTEROL SULFATE HFA (VENTOLIN HFA) 108 (90 BASE) MCG/ACT INHALER    Inhale 2 puffs into the lungs every 6 hours as needed for Wheezing    ALENDRONATE (FOSAMAX) 70 MG TABLET    Take 1 tablet by mouth every 7 days     Spontaneous  Best Verbal Response: Oriented  Best Motor Response: Obeys commands  Christina Coma Scale Score: 15                        CIWA Assessment  BP: (!) 154/79  Pulse: 86             PHYSICAL EXAM  1 or more Elements     ED Triage Vitals [03/30/24 1254]   BP Temp Temp Source Pulse Respirations SpO2 Height Weight - Scale   (!) 108/93 98.4 °F (36.9 °C) Oral 85 20 94 % 1.6 m (5' 3\") 90.7 kg (200 lb)       Physical Exam  Vitals and nursing note reviewed.   Constitutional:       Appearance: She is well-developed. She is not diaphoretic.   HENT:      Head:      Comments: Occipital scalp hematoma without crepitus.  No raccoon eyes or Herrera sign.  No CSF rhinorrhea.     Nose: Nose normal.   Eyes:      General:         Right eye: No discharge.         Left eye: No discharge.      Extraocular Movements: Extraocular movements intact.      Pupils: Pupils are equal, round, and reactive to light.   Cardiovascular:      Rate and Rhythm: Normal rate and regular rhythm.      Heart sounds: No murmur heard.     No friction rub. No gallop.   Pulmonary:      Effort: Pulmonary effort is normal. No respiratory distress.      Breath sounds: No stridor. No wheezing, rhonchi or rales.   Abdominal:      General: Bowel sounds are normal. There is no distension.      Palpations: Abdomen is soft. There is no mass.      Tenderness: There is no abdominal tenderness. There is no guarding or rebound.      Hernia: No hernia is present.   Musculoskeletal:         General: No swelling. Normal range of motion.      Cervical back: Normal range of motion. No rigidity.      Comments: No midline tenderness or step-off in the neck or back.  Gross full range of motion throughout all 4 extremities   Skin:     General: Skin is warm and dry.      Findings: No erythema or rash.   Neurological:      Mental Status: She is alert and oriented to person, place, and time.      Cranial Nerves: No cranial nerve deficit.   Psychiatric:         Behavior: Behavior

## 2024-03-30 NOTE — ED PROVIDER NOTES
THIS IS MY RUTHANN SUPERVISORY AND SHARED VISIT NOTE:    I personally saw the patient and made/approved the management plan and take responsibility for the patient management.    History: 89-year-old female presenting for evaluation of fall, she states that she became dizzy and fell from a standing height, fell and hit her head onto the door frame.  No loss of consciousness.  She is on anticoagulation.  She states that she has previously had vertigo.  This feels similar.  No chest pain.    Exam: There is no focal neurological deficit.  NIH stroke scale score 0.  No weakness to extremities.  No difficulty breathing.  Intact distal pulses.    MDM: 89-year-old female presenting for evaluation of fall, became dizzy during the fall.  Sounds like she likely has peripheral vertigo, she does not have persistent dizziness or focal neurological deficits that is overtly concerning for posterior circulation stroke however this cannot be ruled out.    I personally reviewed CTA imaging and did not reveal any large vessel occlusion.  Initial troponin is mildly elevated.  There is no ischemic changes on EKG.  There is no traumatic injury appreciated on CT head imaging.  Patient was getting ready to leave however had another bout of dizziness and could not ambulate.  This is when CT imaging was added on.  Patient may require admission because of elevated troponin and continued dizziness, difficulty ambulating.    I personally saw the patient and independently provided 0 minutes of non-concurrent critical care out of the total shared critical care time provided.     EKG  The Ekg interpreted by myself in the emergency department in the absence of a cardiologist.  atrial fibrillation with a rate of 81  Axis is   Left axis deviation  QTc is  within an acceptable range  Incomplete RBBB   No specific ST-T wave changes appreciated.  No evidence of acute ischemia.   No prior EKG for comparison    No results found.      I, Dr. Dixon, am the

## 2024-03-30 NOTE — PLAN OF CARE
Problem: Musculoskeletal - Adult  Goal: Return mobility to safest level of function  Outcome: Progressing  Flowsheets (Taken 3/30/2024 1953)  Return Mobility to Safest Level of Function:   Assess patient stability and activity tolerance for standing, transferring and ambulating with or without assistive devices   Assist with transfers and ambulation using safe patient handling equipment as needed   Ensure adequate protection for wounds/incisions during mobilization   Obtain physical therapy/occupational therapy consults as needed   Instruct patient/family in ordered activity level     Problem: Musculoskeletal - Adult  Goal: Return ADL status to a safe level of function  Outcome: Progressing  Flowsheets (Taken 3/30/2024 1953)  Return ADL Status to a Safe Level of Function:   Administer medication as ordered   Assess activities of daily living deficits and provide assistive devices as needed   Obtain physical therapy/occupational therapy consults as needed   Assist and instruct patient to increase activity and self care as tolerated     Problem: Metabolic/Fluid and Electrolytes - Adult  Goal: Glucose maintained within prescribed range  Outcome: Progressing  Flowsheets (Taken 3/30/2024 1953)  Glucose maintained within prescribed range:   Monitor blood glucose as ordered   Assess for signs and symptoms of hyperglycemia and hypoglycemia   Administer ordered medications to maintain glucose within target range   Assess barriers to adequate nutritional intake and initiate nutrition consult as needed   Instruct patient on self management of diabetes and initiate consult as needed

## 2024-03-31 LAB
ANION GAP SERPL CALCULATED.3IONS-SCNC: 9 MMOL/L (ref 3–16)
BUN SERPL-MCNC: 15 MG/DL (ref 7–20)
CALCIUM SERPL-MCNC: 8.7 MG/DL (ref 8.3–10.6)
CHLORIDE SERPL-SCNC: 99 MMOL/L (ref 99–110)
CO2 SERPL-SCNC: 26 MMOL/L (ref 21–32)
CREAT SERPL-MCNC: 0.8 MG/DL (ref 0.6–1.2)
DEPRECATED RDW RBC AUTO: 16.5 % (ref 12.4–15.4)
EKG ATRIAL RATE: 288 BPM
EKG DIAGNOSIS: NORMAL
EKG Q-T INTERVAL: 334 MS
EKG QRS DURATION: 92 MS
EKG QTC CALCULATION (BAZETT): 387 MS
EKG R AXIS: -44 DEGREES
EKG T AXIS: -74 DEGREES
EKG VENTRICULAR RATE: 81 BPM
GFR SERPLBLD CREATININE-BSD FMLA CKD-EPI: 70 ML/MIN/{1.73_M2}
GLUCOSE BLD-MCNC: 140 MG/DL (ref 70–99)
GLUCOSE BLD-MCNC: 190 MG/DL (ref 70–99)
GLUCOSE BLD-MCNC: 204 MG/DL (ref 70–99)
GLUCOSE BLD-MCNC: 213 MG/DL (ref 70–99)
GLUCOSE SERPL-MCNC: 142 MG/DL (ref 70–99)
HCT VFR BLD AUTO: 37.6 % (ref 36–48)
HGB BLD-MCNC: 12.3 G/DL (ref 12–16)
MCH RBC QN AUTO: 30.3 PG (ref 26–34)
MCHC RBC AUTO-ENTMCNC: 32.9 G/DL (ref 31–36)
MCV RBC AUTO: 92.1 FL (ref 80–100)
NT-PROBNP SERPL-MCNC: 2915 PG/ML (ref 0–449)
PERFORMED ON: ABNORMAL
PLATELET # BLD AUTO: 224 K/UL (ref 135–450)
PMV BLD AUTO: 8.4 FL (ref 5–10.5)
POTASSIUM SERPL-SCNC: 3.9 MMOL/L (ref 3.5–5.1)
RBC # BLD AUTO: 4.08 M/UL (ref 4–5.2)
SODIUM SERPL-SCNC: 134 MMOL/L (ref 136–145)
TROPONIN, HIGH SENSITIVITY: 32 NG/L (ref 0–14)
WBC # BLD AUTO: 6.6 K/UL (ref 4–11)

## 2024-03-31 PROCEDURE — 51701 INSERT BLADDER CATHETER: CPT

## 2024-03-31 PROCEDURE — 6360000002 HC RX W HCPCS: Performed by: NURSE PRACTITIONER

## 2024-03-31 PROCEDURE — G0378 HOSPITAL OBSERVATION PER HR: HCPCS

## 2024-03-31 PROCEDURE — 51798 US URINE CAPACITY MEASURE: CPT

## 2024-03-31 PROCEDURE — 6370000000 HC RX 637 (ALT 250 FOR IP): Performed by: HOSPITALIST

## 2024-03-31 PROCEDURE — 99222 1ST HOSP IP/OBS MODERATE 55: CPT | Performed by: INTERNAL MEDICINE

## 2024-03-31 PROCEDURE — 6370000000 HC RX 637 (ALT 250 FOR IP): Performed by: NURSE PRACTITIONER

## 2024-03-31 PROCEDURE — 94640 AIRWAY INHALATION TREATMENT: CPT

## 2024-03-31 PROCEDURE — 93010 ELECTROCARDIOGRAM REPORT: CPT | Performed by: INTERNAL MEDICINE

## 2024-03-31 PROCEDURE — 84484 ASSAY OF TROPONIN QUANT: CPT

## 2024-03-31 PROCEDURE — 36415 COLL VENOUS BLD VENIPUNCTURE: CPT

## 2024-03-31 PROCEDURE — 80048 BASIC METABOLIC PNL TOTAL CA: CPT

## 2024-03-31 PROCEDURE — 85027 COMPLETE CBC AUTOMATED: CPT

## 2024-03-31 PROCEDURE — 96361 HYDRATE IV INFUSION ADD-ON: CPT

## 2024-03-31 PROCEDURE — 51702 INSERT TEMP BLADDER CATH: CPT

## 2024-03-31 PROCEDURE — 83880 ASSAY OF NATRIURETIC PEPTIDE: CPT

## 2024-03-31 PROCEDURE — 87086 URINE CULTURE/COLONY COUNT: CPT

## 2024-03-31 PROCEDURE — 2580000003 HC RX 258: Performed by: HOSPITALIST

## 2024-03-31 RX ORDER — FUROSEMIDE 10 MG/ML
40 INJECTION INTRAMUSCULAR; INTRAVENOUS ONCE
Status: COMPLETED | OUTPATIENT
Start: 2024-03-31 | End: 2024-03-31

## 2024-03-31 RX ORDER — LISINOPRIL 5 MG/1
5 TABLET ORAL DAILY
Status: DISCONTINUED | OUTPATIENT
Start: 2024-03-31 | End: 2024-04-03 | Stop reason: HOSPADM

## 2024-03-31 RX ORDER — MECLIZINE HCL 12.5 MG/1
25 TABLET ORAL 3 TIMES DAILY PRN
Status: DISCONTINUED | OUTPATIENT
Start: 2024-03-31 | End: 2024-04-03 | Stop reason: HOSPADM

## 2024-03-31 RX ORDER — TAMSULOSIN HYDROCHLORIDE 0.4 MG/1
0.4 CAPSULE ORAL DAILY
Status: DISCONTINUED | OUTPATIENT
Start: 2024-03-31 | End: 2024-04-01

## 2024-03-31 RX ADMIN — FUROSEMIDE 40 MG: 10 INJECTION, SOLUTION INTRAMUSCULAR; INTRAVENOUS at 11:57

## 2024-03-31 RX ADMIN — INSULIN LISPRO 2 UNITS: 100 INJECTION, SOLUTION INTRAVENOUS; SUBCUTANEOUS at 13:02

## 2024-03-31 RX ADMIN — APIXABAN 2.5 MG: 2.5 TABLET, FILM COATED ORAL at 19:59

## 2024-03-31 RX ADMIN — DULOXETINE HYDROCHLORIDE 60 MG: 60 CAPSULE, DELAYED RELEASE ORAL at 08:50

## 2024-03-31 RX ADMIN — TIOTROPIUM BROMIDE AND OLODATEROL 2 PUFF: 3.124; 2.736 SPRAY, METERED RESPIRATORY (INHALATION) at 08:26

## 2024-03-31 RX ADMIN — ACETAMINOPHEN 650 MG: 325 TABLET ORAL at 00:48

## 2024-03-31 RX ADMIN — LISINOPRIL 5 MG: 5 TABLET ORAL at 11:57

## 2024-03-31 RX ADMIN — METOPROLOL SUCCINATE 25 MG: 25 TABLET, FILM COATED, EXTENDED RELEASE ORAL at 08:50

## 2024-03-31 RX ADMIN — APIXABAN 2.5 MG: 2.5 TABLET, FILM COATED ORAL at 08:50

## 2024-03-31 RX ADMIN — BUPROPION HYDROCHLORIDE 300 MG: 150 TABLET, EXTENDED RELEASE ORAL at 08:50

## 2024-03-31 RX ADMIN — INSULIN LISPRO 2 UNITS: 100 INJECTION, SOLUTION INTRAVENOUS; SUBCUTANEOUS at 18:38

## 2024-03-31 RX ADMIN — Medication 3 MG: at 23:04

## 2024-03-31 RX ADMIN — SODIUM CHLORIDE, PRESERVATIVE FREE 10 ML: 5 INJECTION INTRAVENOUS at 20:00

## 2024-03-31 RX ADMIN — TAMSULOSIN HYDROCHLORIDE 0.4 MG: 0.4 CAPSULE ORAL at 11:57

## 2024-03-31 RX ADMIN — ATORVASTATIN CALCIUM 20 MG: 10 TABLET, FILM COATED ORAL at 08:50

## 2024-03-31 NOTE — FLOWSHEET NOTE
03/30/24 2100   Vital Signs   Blood Pressure Lying 131/74   Pulse Lying 84 PER MINUTE   Blood Pressure Sitting 147/80   Pulse Sitting 78 PER MINUTE   Blood Pressure Standing 129/62   Pulse Standing 91 PER MINUTE

## 2024-03-31 NOTE — CONSULTS
Perfect serve sent for a stat consult regarding patient admitting, Sent at 08:56 on 03/31/24. Doctor Latonia was contacted.

## 2024-03-31 NOTE — CONSULTS
[facility]   Cardiology Consultation     Date: 3/31/2024  Reason for Consultation: Troponin elevation, dizziness  Consult Requesting Physician: Tena Wong MD     CC: Fall    HPI:   Maria E Valdez is a 89 y.o. female history of falls, permanent atrial fibrillation, hypertension, type 2 diabetes, polyneuropathy, osteoarthritis, COPD.  Patient initially presented for fall.  She has been having multiple falls over the past year, approximately 15 total that she describes as a vertical scrolling or moving like sensation of her visual field that disoriented her and causes her to fall.  She denies weakness in her legs.  Denies room spinning sensation and she remembers having vertigo in the past which she says this is quite different from.  She has occasional dizziness and lightheadedness upon standing but again the sensation she describes as very distinct from that.  Events are often unprovoked, again not related to positional change she can have an upright position or be walking for some time before symptom onset.  She has a short prodrome of the seconds before fall.  No loss of consciousness.  No dysarthria, loss of bowel or bladder.    Review of System:  Complete 10 point ROS performed and negative unless noted in above HPI or below    Prior to Admission medications    Medication Sig Start Date End Date Taking? Authorizing Provider   acetaminophen (TYLENOL) 500 MG tablet Take 2 tablets by mouth every 6 hours as needed for Pain   Yes ProviderMiguel MD   alendronate (FOSAMAX) 70 MG tablet Take 1 tablet by mouth every 7 days   Yes ProviderMiguel MD   glycopyrrolate-formoterol (BEVESPI AEROSPHERE) 9-4.8 MCG/ACT AERO Inhale 2 puffs into the lungs 2 times daily   Yes ProviderMiguel MD   buPROPion (WELLBUTRIN XL) 300 MG extended release tablet Take 1 tablet by mouth every morning   Yes Miguel Pizano MD   apixaban (ELIQUIS) 2.5 MG TABS tablet Take 1 tablet by mouth 2 times daily   Yes  inconsistent with neurocardiogenic or arrhythmogenic etiology   - Denies anginal symptoms, troponin elevation is likely demand mediated, nonischemic EKG, no further ischemic evaluation  - From atrial fibrillation, continue Eliquis 2.5 mg twice daily  - Continue metoprolol 25 mg XL daily  - Consider neurology consultation  - Cardiology will sign off, please reach out with further questions or concerns    NOTE: This report was transcribed using voice recognition software. Every effort was made to ensure accuracy, however, inadvertent computerized transcription errors may be present.     José Miguel Haynes MD  [facility]   Office: [phone]  Fax: [phone]

## 2024-03-31 NOTE — CONSULTS
Neur consult was placed for patient due to reoccuring falls, Consult was called into office and doctor will call back, Consult placed on 3/31/24 at 13:00. Aleksandr PERALTA.

## 2024-03-31 NOTE — PLAN OF CARE
Problem: Discharge Planning  Goal: Discharge to home or other facility with appropriate resources  Outcome: Progressing     Problem: Safety - Adult  Goal: Free from fall injury  Outcome: Progressing     Problem: ABCDS Injury Assessment  Goal: Absence of physical injury  Outcome: Progressing     Problem: Cardiovascular - Adult  Goal: Maintains optimal cardiac output and hemodynamic stability  Outcome: Progressing  Goal: Absence of cardiac dysrhythmias or at baseline  Outcome: Progressing     Problem: Skin/Tissue Integrity - Adult  Goal: Skin integrity remains intact  Outcome: Progressing     Problem: Musculoskeletal - Adult  Goal: Return mobility to safest level of function  Outcome: Progressing  Goal: Return ADL status to a safe level of function  Outcome: Progressing     Problem: Metabolic/Fluid and Electrolytes - Adult  Goal: Electrolytes maintained within normal limits  Outcome: Progressing  Goal: Glucose maintained within prescribed range  Outcome: Progressing

## 2024-03-31 NOTE — PROGRESS NOTES
Hospital Medicine Progress Note      Date of Admission: 3/30/2024  Hospital Day: 2    Chief Admission Complaint:    Fall (Patient comes from [place], got dizzy and fell from standing height. Hit head on woodwork in the kitchen on the wall. No LOC, on blood thinners. Contusion to back of head.)     Subjective:      Patient tells me she was dizzy when she got up at home and then fell. This has never happened before. Before it was more due to tripping over items in her walkway.  She says she received something in the ED and has had no dizziness since. Denies HA, visual changes, C/P, SOB, LUA, Abdominal pain, N/V/D/C.     Bianka ZHOU and I got her up to the chair. She had dizziness upon sitting up that resolved without intervention. She was able to walk to the chair and sit without dizziness.     Presenting Admission History:       Maria E Valdez is a 89 y.o. 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  She states she was eating breakfast today and after that she stood up and felt everything is moving and then she fell and hit back of her head.  She states in the last few days she fell 3 times.  Feels everything moves before she falls.  Denies any loss of consciousness.   Denies any chest pain, shortness of breath, nausea/vomiting or any other complaints.  In ED she was found to have elevated troponin and hospitalist team was contacted for further management.       Assessment/Plan:      Current Principal Problem:  Dizziness    Frequent falls over past month or two possibly 2/2 diabetic polyneuropathy, h/o severe L3-4 and L4-5 lumbar stenosis, and dizziness  Patient declined NS intervention at [facility] for her stenosis  CT head and cervical spine no acute process  CTA H/N mild atherosclerosis, otherwise unremarkable CT angiogram of the head and  neck.  - Continue telemetry monitoring  - Orthostatic vitals wnl  -

## 2024-04-01 ENCOUNTER — APPOINTMENT (OUTPATIENT)
Dept: MRI IMAGING | Age: 89
DRG: 064 | End: 2024-04-01
Payer: MEDICARE

## 2024-04-01 LAB
ANION GAP SERPL CALCULATED.3IONS-SCNC: 9 MMOL/L (ref 3–16)
BACTERIA UR CULT: NORMAL
BASOPHILS # BLD: 0 K/UL (ref 0–0.2)
BASOPHILS NFR BLD: 0.5 %
BUN SERPL-MCNC: 16 MG/DL (ref 7–20)
CALCIUM SERPL-MCNC: 8.8 MG/DL (ref 8.3–10.6)
CHLORIDE SERPL-SCNC: 100 MMOL/L (ref 99–110)
CO2 SERPL-SCNC: 28 MMOL/L (ref 21–32)
CREAT SERPL-MCNC: 1 MG/DL (ref 0.6–1.2)
DEPRECATED RDW RBC AUTO: 16.6 % (ref 12.4–15.4)
EOSINOPHIL # BLD: 0.2 K/UL (ref 0–0.6)
EOSINOPHIL NFR BLD: 2.8 %
GFR SERPLBLD CREATININE-BSD FMLA CKD-EPI: 54 ML/MIN/{1.73_M2}
GLUCOSE BLD-MCNC: 167 MG/DL (ref 70–99)
GLUCOSE BLD-MCNC: 176 MG/DL (ref 70–99)
GLUCOSE BLD-MCNC: 204 MG/DL (ref 70–99)
GLUCOSE BLD-MCNC: 232 MG/DL (ref 70–99)
GLUCOSE SERPL-MCNC: 198 MG/DL (ref 70–99)
HCT VFR BLD AUTO: 37.6 % (ref 36–48)
HGB BLD-MCNC: 12.4 G/DL (ref 12–16)
LYMPHOCYTES # BLD: 1 K/UL (ref 1–5.1)
LYMPHOCYTES NFR BLD: 13.5 %
MAGNESIUM SERPL-MCNC: 1.5 MG/DL (ref 1.8–2.4)
MCH RBC QN AUTO: 30.5 PG (ref 26–34)
MCHC RBC AUTO-ENTMCNC: 33.1 G/DL (ref 31–36)
MCV RBC AUTO: 92.1 FL (ref 80–100)
MONOCYTES # BLD: 0.5 K/UL (ref 0–1.3)
MONOCYTES NFR BLD: 6.6 %
NEUTROPHILS # BLD: 5.9 K/UL (ref 1.7–7.7)
NEUTROPHILS NFR BLD: 76.6 %
PERFORMED ON: ABNORMAL
PLATELET # BLD AUTO: 227 K/UL (ref 135–450)
PMV BLD AUTO: 7.8 FL (ref 5–10.5)
POTASSIUM SERPL-SCNC: 4.3 MMOL/L (ref 3.5–5.1)
RBC # BLD AUTO: 4.08 M/UL (ref 4–5.2)
SODIUM SERPL-SCNC: 137 MMOL/L (ref 136–145)
WBC # BLD AUTO: 7.7 K/UL (ref 4–11)

## 2024-04-01 PROCEDURE — 6370000000 HC RX 637 (ALT 250 FOR IP): Performed by: HOSPITALIST

## 2024-04-01 PROCEDURE — G0378 HOSPITAL OBSERVATION PER HR: HCPCS

## 2024-04-01 PROCEDURE — 36415 COLL VENOUS BLD VENIPUNCTURE: CPT

## 2024-04-01 PROCEDURE — 97535 SELF CARE MNGMENT TRAINING: CPT

## 2024-04-01 PROCEDURE — 97110 THERAPEUTIC EXERCISES: CPT

## 2024-04-01 PROCEDURE — 83735 ASSAY OF MAGNESIUM: CPT

## 2024-04-01 PROCEDURE — 2580000003 HC RX 258: Performed by: HOSPITALIST

## 2024-04-01 PROCEDURE — 80048 BASIC METABOLIC PNL TOTAL CA: CPT

## 2024-04-01 PROCEDURE — 70551 MRI BRAIN STEM W/O DYE: CPT

## 2024-04-01 PROCEDURE — 85025 COMPLETE CBC W/AUTO DIFF WBC: CPT

## 2024-04-01 PROCEDURE — 6370000000 HC RX 637 (ALT 250 FOR IP): Performed by: NURSE PRACTITIONER

## 2024-04-01 PROCEDURE — 96365 THER/PROPH/DIAG IV INF INIT: CPT

## 2024-04-01 PROCEDURE — 96366 THER/PROPH/DIAG IV INF ADDON: CPT

## 2024-04-01 PROCEDURE — 97162 PT EVAL MOD COMPLEX 30 MIN: CPT

## 2024-04-01 PROCEDURE — 97116 GAIT TRAINING THERAPY: CPT

## 2024-04-01 PROCEDURE — 99223 1ST HOSP IP/OBS HIGH 75: CPT | Performed by: PSYCHIATRY & NEUROLOGY

## 2024-04-01 PROCEDURE — 97166 OT EVAL MOD COMPLEX 45 MIN: CPT

## 2024-04-01 PROCEDURE — 6360000002 HC RX W HCPCS: Performed by: HOSPITALIST

## 2024-04-01 PROCEDURE — 94640 AIRWAY INHALATION TREATMENT: CPT

## 2024-04-01 RX ADMIN — ATORVASTATIN CALCIUM 20 MG: 10 TABLET, FILM COATED ORAL at 09:20

## 2024-04-01 RX ADMIN — DULOXETINE HYDROCHLORIDE 60 MG: 60 CAPSULE, DELAYED RELEASE ORAL at 09:20

## 2024-04-01 RX ADMIN — SODIUM CHLORIDE, PRESERVATIVE FREE 10 ML: 5 INJECTION INTRAVENOUS at 21:29

## 2024-04-01 RX ADMIN — APIXABAN 2.5 MG: 2.5 TABLET, FILM COATED ORAL at 21:29

## 2024-04-01 RX ADMIN — TAMSULOSIN HYDROCHLORIDE 0.4 MG: 0.4 CAPSULE ORAL at 09:20

## 2024-04-01 RX ADMIN — MAGNESIUM SULFATE HEPTAHYDRATE 2000 MG: 40 INJECTION, SOLUTION INTRAVENOUS at 09:56

## 2024-04-01 RX ADMIN — BUPROPION HYDROCHLORIDE 300 MG: 150 TABLET, EXTENDED RELEASE ORAL at 09:20

## 2024-04-01 RX ADMIN — METOPROLOL SUCCINATE 25 MG: 25 TABLET, FILM COATED, EXTENDED RELEASE ORAL at 09:21

## 2024-04-01 RX ADMIN — SODIUM CHLORIDE 25 ML: 9 INJECTION, SOLUTION INTRAVENOUS at 09:55

## 2024-04-01 RX ADMIN — TIOTROPIUM BROMIDE AND OLODATEROL 2 PUFF: 3.124; 2.736 SPRAY, METERED RESPIRATORY (INHALATION) at 08:49

## 2024-04-01 RX ADMIN — SODIUM CHLORIDE, PRESERVATIVE FREE 10 ML: 5 INJECTION INTRAVENOUS at 09:21

## 2024-04-01 RX ADMIN — APIXABAN 2.5 MG: 2.5 TABLET, FILM COATED ORAL at 09:21

## 2024-04-01 RX ADMIN — INSULIN LISPRO 2 UNITS: 100 INJECTION, SOLUTION INTRAVENOUS; SUBCUTANEOUS at 18:44

## 2024-04-01 RX ADMIN — LISINOPRIL 5 MG: 5 TABLET ORAL at 09:21

## 2024-04-01 ASSESSMENT — PAIN SCALES - GENERAL
PAINLEVEL_OUTOF10: 0

## 2024-04-01 NOTE — PROGRESS NOTES
Hospital Medicine Progress Note      Date of Admission: 3/30/2024  Hospital Day: 3    Chief Admission Complaint:    Fall (Patient comes from [place], got dizzy and fell from standing height. Hit head on woodwork in the kitchen on the wall. No LOC, on blood thinners. Contusion to back of head.)     Subjective:      Patient resting quietly today. She tells me she is still having dizziness when she attempts to get out of bed and moving. She states that it will go away after about 5 minutes of resting. Has taken no Meclizine.    Presenting Admission History:       Maria E Valdez is a 89 y.o. 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  She states she was eating breakfast today and after that she stood up and felt everything is moving and then she fell and hit back of her head.  She states in the last few days she fell 3 times.  Feels everything moves before she falls.  Denies any loss of consciousness.   Denies any chest pain, shortness of breath, nausea/vomiting or any other complaints.  In ED she was found to have elevated troponin and hospitalist team was contacted for further management.       Assessment/Plan:      Current Principal Problem:  Dizziness    Frequent falls over past month or two possibly 2/2 diabetic polyneuropathy, h/o severe L3-4 and L4-5 lumbar stenosis, and dizziness  Patient declined [facility] intervention at [facility] for her stenosis  CT head and cervical spine no acute process  CTA H/N mild atherosclerosis, otherwise unremarkable CT angiogram of the head and  neck.  MRI brain ordered and pending  - Continue telemetry monitoring  - Orthostatic vitals wnl  - PT/OT to eval and treat. Rec: SNF   - Continue meclizine  - Cardiology consulted; appreciate their input    Diabetes type II, sub-optimally controlled  A1C noted to be 8.3 1/29/24.    - Hold home regimen while admitted, anticipate resuming on

## 2024-04-01 NOTE — PROGRESS NOTES
Physical Therapy  Facility/Department: [place] [id number] - MED SURG  Physical Therapy Initial Assessment/ Treatment    Name: Maria E Valdez  : 8/3/1934  MRN: [id number]  Date of Service: 2024    Discharge Recommendations:  Subacute/Skilled Nursing Facility        Therapy discharge recommendations take into account each patient's current medical complexities and are subject to input/oversight from the patient's healthcare team.   Barriers to Home Discharge:   [] Steps to access home entry or bed/bath:   [x] Unable to transfer, ambulate, or propel wheelchair household distances without assist   [] Limited available assist at home upon discharge    [] Patient or family requests d/c to post-acute facility    [] Poor cognition/safety awareness for d/c to home alone    []Unable to maintain ordered weight bearing status    [] Patient with salient signs of long-standing immobility   [] Patient is at risk for falls due to:   [] Other:    If pt is unable to be seen after this session, please let this note serve as discharge summary.  Please see case management note for discharge disposition.  Thank you.   Patient Diagnosis(es): The primary encounter diagnosis was Injury of head, initial encounter. Diagnoses of Fall, initial encounter, Anticoagulated by anticoagulation treatment, Dizziness, and Elevated troponin were also pertinent to this visit.  Past Medical History:  has a past medical history of Diabetes (HCC) and Osteoarthrosis.  Past Surgical History:  has a past surgical history that includes knee surgery.    Assessment   Body Structures, Functions, Activity Limitations Requiring Skilled Therapeutic Intervention: Decreased functional mobility ;Decreased strength;Decreased endurance;Decreased balance;Decreased safe awareness  Assessment: 89 yo female on OBS with dizziness and fall. PMH: COPD, a fib , HTN, OA, Polyneuropathy, DM2. PLOF: lives alone in assisted living apt, has supervision for bathing, amb indep with RW, has      Education  Patient Education  Education Given To: Patient  Education Provided: Role of Therapy;Plan of Care;Transfer Training  Education Method: Demonstration;Verbal  Barriers to Learning:  (readiness to learn)  Education Outcome: Verbalized understanding;Continued education needed      Therapy Time   Individual Concurrent Group Co-treatment   Time In 1240         Time Out 1315         Minutes 35         Timed Code Treatment Minutes: 23 Minutes       Nicole Curran, PT

## 2024-04-01 NOTE — PLAN OF CARE
Problem: Cardiovascular - Adult  Goal: Maintains optimal cardiac output and hemodynamic stability  3/31/2024 2217 by Gustavo Silveira RN  Outcome: Progressing  Flowsheets (Taken 3/31/2024 2217)  Maintains optimal cardiac output and hemodynamic stability:   Monitor blood pressure and heart rate   Monitor urine output and notify Licensed Independent Practitioner for values outside of normal range   Assess for signs of decreased cardiac output     Problem: Cardiovascular - Adult  Goal: Absence of cardiac dysrhythmias or at baseline  3/31/2024 2217 by Gustavo Silveira RN  Outcome: Progressing  Flowsheets (Taken 3/31/2024 2217)  Absence of cardiac dysrhythmias or at baseline:   Monitor cardiac rate and rhythm   Assess for signs of decreased cardiac output     Problem: Metabolic/Fluid and Electrolytes - Adult  Goal: Electrolytes maintained within normal limits  3/31/2024 2217 by Gustavo Silveira RN  Outcome: Progressing  Flowsheets (Taken 3/31/2024 2217)  Electrolytes maintained within normal limits:   Monitor labs and assess patient for signs and symptoms of electrolyte imbalances   Administer electrolyte replacement as ordered   Monitor response to electrolyte replacements, including repeat lab results as appropriate     Problem: Metabolic/Fluid and Electrolytes - Adult  Goal: Glucose maintained within prescribed range  3/31/2024 2217 by Gustavo Silveira RN  Outcome: Progressing  Flowsheets (Taken 3/30/2024 1953)  Glucose maintained within prescribed range:   Monitor blood glucose as ordered   Assess for signs and symptoms of hyperglycemia and hypoglycemia   Administer ordered medications to maintain glucose within target range   Assess barriers to adequate nutritional intake and initiate nutrition consult as needed   Instruct patient on self management of diabetes and initiate consult as needed

## 2024-04-01 NOTE — PROGRESS NOTES
Occupational Therapy  Facility/Department: [place] [address] - MED SURG  Occupational Therapy Initial Assessment and Treatment    Name: Maria E Valdez  : 8/3/1934  MRN: [id number]  Date of Service: 2024    Discharge Recommendations:  Subacute/Skilled Nursing Facility  OT Equipment Recommendations  Equipment Needed: No  Other: Defer to next level of care     Therapy discharge recommendations are subject to collaboration from the patient’s interdisciplinary healthcare team, including MD and case management recommendations.    Barriers to Home Discharge:   [] Steps to access home entry or bed/bath:   [x] Unable to transfer, ambulate, or propel wheelchair household distances without assist   [] Limited available assist at home upon discharge    [] Patient or family requests d/c to post-acute facility    [x] Poor cognition/safety awareness for d/c to home alone    [] Unable to maintain ordered weight bearing status    [] Patient with salient signs of long-standing immobility   [x] Decreased independence with ADLs   [x] Increased risk for falls   [] Other:    If pt is unable to be seen after this session, please let this note serve as discharge summary.  Please see case management note for discharge disposition.  Thank you.    Patient Diagnosis(es): The primary encounter diagnosis was Injury of head, initial encounter. Diagnoses of Fall, initial encounter, Anticoagulated by anticoagulation treatment, Dizziness, and Elevated troponin were also pertinent to this visit.  Past Medical History:  has a past medical history of Diabetes (HCC) and Osteoarthrosis.  Past Surgical History:  has a past surgical history that includes knee surgery.           Assessment   Performance deficits / Impairments: Decreased functional mobility ;Decreased ADL status;Decreased strength;Decreased safe awareness;Decreased cognition;Decreased posture;Decreased balance;Decreased endurance  Assessment: Pt is 88 yo female presenting from assisted living

## 2024-04-01 NOTE — PLAN OF CARE
Problem: Discharge Planning  Goal: Discharge to home or other facility with appropriate resources  Outcome: Progressing  Flowsheets  Taken 4/1/2024 1039 by Graciela Alvarez  Discharge to home or other facility with appropriate resources: Identify barriers to discharge with patient and caregiver  Taken 4/1/2024 0924 by Rocío Linda RN  Discharge to home or other facility with appropriate resources: Identify barriers to discharge with patient and caregiver     Problem: Safety - Adult  Goal: Free from fall injury  Outcome: Progressing     Problem: ABCDS Injury Assessment  Goal: Absence of physical injury  Outcome: Progressing     Problem: Cardiovascular - Adult  Goal: Maintains optimal cardiac output and hemodynamic stability  4/1/2024 1215 by Graciela Alvarez  Outcome: Progressing  Flowsheets  Taken 4/1/2024 1039 by Graciela Alvarez  Maintains optimal cardiac output and hemodynamic stability: Monitor blood pressure and heart rate  Taken 4/1/2024 0924 by Rocío Linda RN  Maintains optimal cardiac output and hemodynamic stability: Monitor blood pressure and heart rate  3/31/2024 2217 by Gustavo Silveira RN  Outcome: Progressing  Flowsheets (Taken 3/31/2024 2217)  Maintains optimal cardiac output and hemodynamic stability:   Monitor blood pressure and heart rate   Monitor urine output and notify Licensed Independent Practitioner for values outside of normal range   Assess for signs of decreased cardiac output  Goal: Absence of cardiac dysrhythmias or at baseline  4/1/2024 1215 by Graciela Alvarez  Outcome: Progressing  Flowsheets  Taken 4/1/2024 1039 by Graciela Alvarez  Absence of cardiac dysrhythmias or at baseline: Monitor cardiac rate and rhythm  Taken 4/1/2024 0924 by Rocío Linda RN  Absence of cardiac dysrhythmias or at baseline: Monitor cardiac rate and rhythm  3/31/2024 2217 by Gustavo Silveira RN  Outcome: Progressing  Flowsheets (Taken 3/31/2024 2217)  Absence of  cardiac dysrhythmias or at baseline:   Monitor cardiac rate and rhythm   Assess for signs of decreased cardiac output     Problem: Skin/Tissue Integrity - Adult  Goal: Skin integrity remains intact  Outcome: Progressing  Flowsheets  Taken 4/1/2024 1214 by Graciela Alvarez  Skin Integrity Remains Intact: Monitor for areas of redness and/or skin breakdown  Taken 4/1/2024 1039 by Graciela Alvarez  Skin Integrity Remains Intact: Monitor for areas of redness and/or skin breakdown  Taken 4/1/2024 0924 by Rocío Linda RN  Skin Integrity Remains Intact: Monitor for areas of redness and/or skin breakdown     Problem: Musculoskeletal - Adult  Goal: Return mobility to safest level of function  Outcome: Progressing  Flowsheets  Taken 4/1/2024 1039 by Graciela Alvarez  Return Mobility to Safest Level of Function: Assess patient stability and activity tolerance for standing, transferring and ambulating with or without assistive devices  Taken 4/1/2024 0924 by Rocío Linda RN  Return Mobility to Safest Level of Function: Assess patient stability and activity tolerance for standing, transferring and ambulating with or without assistive devices  Goal: Return ADL status to a safe level of function  Outcome: Progressing  Flowsheets  Taken 4/1/2024 1039 by Graciela Alvarez  Return ADL Status to a Safe Level of Function: Administer medication as ordered  Taken 4/1/2024 0924 by Rocío Linda RN  Return ADL Status to a Safe Level of Function: Administer medication as ordered     Problem: Metabolic/Fluid and Electrolytes - Adult  Goal: Electrolytes maintained within normal limits  4/1/2024 1215 by Graciela Alvarez  Outcome: Progressing  Flowsheets  Taken 4/1/2024 1039 by Graciela Alvarez  Electrolytes maintained within normal limits: Administer electrolyte replacement as ordered  Taken 4/1/2024 0924 by Rocío Linda RN  Electrolytes maintained within normal limits: Administer electrolyte replacement

## 2024-04-01 NOTE — CARE COORDINATION
Case Management Assessment  Initial Evaluation    Date/Time of Evaluation: 4/1/2024 1:06 PM  Assessment Completed by: Alba Peñaloza RN    If patient is discharged prior to next notation, then this note serves as note for discharge by case management.    Patient Name: Maria E Valdez                   YOB: 1934  Diagnosis: Dizziness [R42]  Elevated troponin [R79.89]  Anticoagulated by anticoagulation treatment [Z79.01]  Injury of head, initial encounter [S09.90XA]  Fall, initial encounter [W19.XXXA]                   Date / Time: 3/30/2024 12:51 PM    Patient Admission Status: Observation   Readmission Risk (Low < 19, Mod (19-27), High > 27): No data recorded  Current PCP: Moises Winslow MD  PCP verified by ?      Chart Reviewed: Yes      History Provided by: Patient  Patient Orientation: Alert and Oriented, Person, Place, Situation    Patient Cognition: Alert    Hospitalization in the last 30 days (Readmission):  No    If yes, Readmission Assessment in  Navigator will be completed.    Advance Directives:      Code Status: Full Code   Patient's Primary Decision Maker is:        Discharge Planning:    Patient lives with: Other (Comment) Type of Home: Independent Living (came from home alone, was just placed at [place])  Primary Care Giver: Self  Patient Support Systems include: Family Members, Children   Current Financial resources:    Current community resources:    Current services prior to admission: None            Current DME:              Type of Home Care services:  PT, OT    ADLS  Prior functional level:    Current functional level:      PT AM-PAC:   /24  OT AM-PAC:   /24    Family can provide assistance at DC:    Would you like Case Management to discuss the discharge plan with any other family members/significant others, and if so, who?    Plans to Return to Present Housing:    Other Identified Issues/Barriers to RETURNING to current housing:   Potential Assistance needed at discharge:

## 2024-04-01 NOTE — CONSULTS
Nutrition Note    Consult \"HF diet guidelines\". Pt did not answer phone x2, rang busy. Pt currently in observation for recurrent falls and urinary retention. Will provide diet education in D/C handouts if pt to D/C prior to in person diet review.    Electronically signed by Katia Roe RD, LD on 4/1/24 at 1:15 PM EDT    Contact: [phone]

## 2024-04-01 NOTE — PLAN OF CARE
Notified about MRI findings. Patient stable per nurse. Neurology following. Patient on Eliquis and statin.    SHORTY WHALEY MD  4/1/2024  6:50 PM

## 2024-04-01 NOTE — CONSULTS
Neurology consultation note    Patient name: Maria E Valdez      Chief Complaint:  Fall.    History of present illness:  This is an 89 years old right-handed female.  The patient was brought to the hospital yesterday after another episode of fall.  The patient has had 4-5 falls over the last 5 days.  The patient had a similar event in the past the last fall was about few months ago.  The patient also reports spinning sensation and unsteadiness prior to the fall.  But the patient did not loss of consciousness, focal weakness or numbness.  The patient reveals history of bilateral tremor on both hands for years.  The patient also reports intermittent gait difficulty without spinning sensation or lightheadedness.  The patient is noted for chronic diabetes with long-term insulin use.  But the patient denies significant numbness on her feet.    Past medical history:    Past Medical History:   Diagnosis Date    Diabetes (HCC)     Osteoarthrosis 6/20/2013       Past surgical history:    Past Surgical History:   Procedure Laterality Date    KNEE SURGERY          Medication:    Current Facility-Administered Medications   Medication Dose Route Frequency Provider Last Rate Last Admin    meclizine (ANTIVERT) tablet 25 mg  25 mg Oral TID PRN Stella Ybarra APRN - CNP        tamsulosin (FLOMAX) capsule 0.4 mg  0.4 mg Oral Daily Stella Ybarra APRN - CNP   0.4 mg at 03/31/24 1157    lisinopril (PRINIVIL;ZESTRIL) tablet 5 mg  5 mg Oral Daily Stella Ybarra APRN - CNP   5 mg at 03/31/24 1157    sodium chloride flush 0.9 % injection 5-40 mL  5-40 mL IntraVENous 2 times per day Zachary Harmon MD   10 mL at 03/31/24 2000    sodium chloride flush 0.9 % injection 5-40 mL  5-40 mL IntraVENous PRN Zachary Harmon MD        0.9 % sodium chloride infusion   IntraVENous PRN Zachary Harmon MD        potassium chloride (KLOR-CON M) extended release tablet 40 mEq  40 mEq Oral PRN Zachary Harmon  Zachary ACHARYA MD        dextrose bolus 10% 125 mL  125 mL IntraVENous PRN Zachary Harmon MD        Or    dextrose bolus 10% 250 mL  250 mL IntraVENous PRN Zachary Harmon MD        glucagon injection 1 mg  1 mg SubCUTAneous PRN Zachary Harmon MD        dextrose 10 % infusion   IntraVENous Continuous PRZachary Shabazz MD           Allergies:    Allergies as of 03/30/2024    (No Known Allergies)        Social history:     reports that she has never smoked. She has never used smokeless tobacco. She reports that she does not drink alcohol and does not use drugs.     Family history:    Family History   Problem Relation Age of Onset    Cancer Mother     Diabetes Sister     Anesth Problems Neg Hx     Broken Bones Neg Hx     Clotting Disorder Neg Hx     Collagen Disease Neg Hx     Dislocations Neg Hx     Osteoporosis Neg Hx     Rheumatologic Disease Neg Hx     Scoliosis Neg Hx     Severe Sprains Neg Hx         Review of system:  No chest pain, shortness of breath, palpitation, cough, fever, abdominal pain, vomiting, diarrhea, dysuria, vertigo, joint pain, change in speech/vision or new onset of weakness/numbness. Remaining as per HPI.      BP (!) 147/76   Pulse 83   Temp 97.6 °F (36.4 °C) (Oral)   Resp 16   Ht 1.6 m (5' 3\")   Wt 84.2 kg (185 lb 11.2 oz)   SpO2 93%   BMI 32.90 kg/m²       Neurological examination:  MENTAL STATUS:  Alert and oriented to person.  LANG/SPEECH: No dysarthria.  CRANIAL NERVES: No facial asymmetry.  MOTOR: No pronator drift or leg drift.  REFLEXES: 1+ on both legs..  SENSORY: Grossly intact.  COORD: Minimal action tremor and mild degree of cogwheel rigidity.    Imaging, procedure, and laboratory data:   I reviewed the brain imagings.    Assessment:    1.  Frequent falls.  2.  Tremor.  3.  Chronic diabetes.  4.  S/p left total knee replacement.  5.  Chronic anticoagulation use with atrial fibrillation.    The patient has evidence of tremor and mild degree of parkinsonism.  The CTA of  head and neck showed no significant hemodynamic stenosis.  The CT brain also showed no evidence of acute infarction.    From neurology perspective, the patient has multiple risk factors of fall including OA knee, polypharmacy, possible neuropathy, and parkinsonism.    Neurology recommend MRI brain to exclude CVA or vascular parkinsonism.    Plan:    1.  MRI brain without contrast.  2.  Continue Eliquis and statin.  3.  PT/OT/ST.  4.  The target blood pressure below 140/90.  5.  Avoid hypotension.  6.  Continue cardiac telemetry.      75 minutes were spent with the patient with greater than 50% of the time spent in counseling and coordination of care.    Dante Joiner MD

## 2024-04-01 NOTE — PROGRESS NOTES
Physical Therapy:   Orders received, chart reviewed. Pt declined PT at this time. States she just worked with OT and does not want to work. Will try again later today.  Nicole Curran PT

## 2024-04-01 NOTE — DISCHARGE INSTR - DIET
Good nutrition is important when healing from an illness, injury, or surgery.  Follow any nutrition recommendations given to you during your hospital stay.   If you were given an oral nutrition supplement while in the hospital, continue to take this supplement at home.  You can take it with meals, in-between meals, and/or before bedtime. These supplements can be purchased at most local grocery stores, pharmacies, and chain super-stores.   If you have any questions about your diet or nutrition, call the hospital and ask for the dietitian.    Goal Sodium Intake: less than 2000 mg (unless your physician tells you a different number)   Goal Fluid Intake: less than 64 oz (unless your physician tells you a different number)    This nutrition therapy will help you feel better and support your heart.    This plan focuses on:  Limiting sodium in your diet. Salt (sodium) makes your body hold water. When your body holds too much water, you can feel shortness of breath and swelling. You can prevent these symptoms by eating less salt.  Limiting fluid in your diet. For some patients, drinking too much fluid can make heart failure worse. It can cause symptoms such as shortness of breath and swelling. Limiting fluids can help relieve some of your symptoms.    You can achieve these goals by:  Reading food labels to keep track of how much sodium is in the foods you eat.  Limiting foods that are high in sodium.  Checking your weight to make sure you’re not retaining too much fluid.    The nutrition plan for heart failure usually limits the sodium you get from food and drinks to 2,000 milligrams per day. Salt is the main source of sodium. Read the nutrition label to find out how much sodium is in 1 serving of a food.  Select foods with 140 milligrams of sodium or less per serving.  Foods with more than 300 milligrams of sodium per serving may not fit into a reduced-sodium meal plan.  Check serving sizes. If you eat more than 1 serving,  you will get more sodium than the amount listed.    Cutting Back on Sodium  Avoid processed foods. Eat more fresh foods.   Fresh and frozen fruits and vegetables without added juices or sauces are naturally low in sodium.  Fresh meats are lower in sodium than processed meats, such as cobb, sausage, and hot dogs. Read the nutrition label or ask your  to help you find a fresh meat that is low in sodium.  Eat less salt, at the table and when cooking.   Just 1 teaspoon of table salt has 2,300 milligrams of sodium.  Leave the salt out of recipes for pasta, casseroles, and soups.  Be a smart .   Look for food packages that say “salt-free” or “sodium-free.” These items contain less than 5 milligrams of sodium per serving.  “Very-low-sodium” products contain less than 35 milligrams of sodium per serving.  “Low-sodium” products contain less than 140 milligrams of sodium per serving.  “Unsalted” or “no added salt” products may still be high in sodium. Check the nutrition label.  Add flavors to your food without adding sodium.   Try lemon juice, lime juice, fruit juice, or vinegar.  Dry or fresh herbs add flavor. Try basil, bay leaf, dill, rosemary, parsley, lenora, dry mustard, nutmeg, thyme, and paprika.  Pepper, red pepper flakes, and cayenne pepper can add spice to your meals without adding sodium. Hot sauce contains sodium, but if you use just a drop or two, it will not add up to much.  Buy a sodium-free seasoning blend or make your own at home.  Use caution when you eat outside your home.   Restaurant foods can be very high in sodium.  Ask for nutrition information. Many restaurants provide nutrition facts on their menus or websites.  Let your  know that you want your food to be cooked without salt. Ask for your salad dressing and sauces to come “on the side.”    Fluid Restriction  Your doctor may ask you to follow a fluid restriction in addition to taking diuretics (water pills). Ask your doctor how  much fluid you can have. Foods that are liquid at room temperature are considered a fluid, such as popsicles, soup, ice cream, and Jell-O.     Foods Not Recommended  Breads or crackers topped with salt  Prepackaged bread crumbs   Self-rising flours   Canned vegetables (unless they are salt free or low sodium)  Sauerkraut and pickled vegetables   Canned or dried soups (unless they are salt free or low sodium)  French fries, onion rings, and other fried foods   Processed cheeses   Cottage cheese   Cured meats: cobb, ham, sausage, pepperoni, and hot dogs   Canned meats  Smoked fish and meats   Frozen meals (that have more than 600 mg sodium)  Salter butter or margarine   Any type of salt seasoning (sea salt, kosher salt, onion salt, garlic salt, seasoning blends made with salt)  Ketchup, BBQ sauce, soy sauce, Worcestershire sauce  Salsa, pickles, olives, relish  Salad dressings: ranch, blue cheese, Italian, French

## 2024-04-01 NOTE — ACP (ADVANCE CARE PLANNING)
Advance Care Planning     General Advance Care Planning (ACP) Conversation    Date of Conversation: 3/30/2024  Conducted with: Patient with Decision Making Capacity   Healthcare Decision Maker: Next of Kin by law (only applies in absence of above) (name) Suki Tuttle (granddaughter) [phone]    Healthcare Decision Maker:  No healthcare decision makers have been documented.  Click here to complete HealthCare Decision Makers including selection of the Healthcare Decision Maker Relationship (ie \"Primary\")  Today we documented Decision Maker(s) consistent with Legal Next of Kin hierarchy.    Content/Action Overview:  Has NO ACP documents-Information provided  Reviewed DNR/DNI and patient elects Full Code (Attempt Resuscitation)      Length of Voluntary ACP Conversation in minutes:  <16 minutes (Non-Billable)    Alba Peñaloza RN

## 2024-04-01 NOTE — PROGRESS NOTES
4 Eyes Skin Assessment     NAME:  Maria E Valdez  YOB: 1934  MEDICAL RECORD NUMBER:  [id number]    The patient is being assessed for  Admission    I agree that at least one RN has performed a thorough Head to Toe Skin Assessment on the patient. ALL assessment sites listed below have been assessed.      Areas assessed by both nurses:    Head, Face, Ears, Shoulders, Back, Chest, Arms, Elbows, Hands, Sacrum. Buttock, Coccyx, Ischium, Legs. Feet and Heels, and Under Medical Devices         Does the Patient have a Wound? No noted wound(s); scattered bruising       Jonathan Prevention initiated by RN: No  Wound Care Orders initiated by RN: No    Pressure Injury (Stage 3,4, Unstageable, DTI, NWPT, and Complex wounds) if present, place Wound referral order by RN under : No    New Ostomies, if present place, Ostomy referral order under : No     Nurse 1 eSignature: Electronically signed by Rocío Linda RN on 4/1/24 at 7:45 PM EDT    **SHARE this note so that the co-signing nurse can place an eSignature**    Nurse 2 eSignature: Electronically signed by Todd Alvarez RN on 4/1/24 at 10:37 PM EDT

## 2024-04-01 NOTE — CONSULTS
Urology Consult Note      Reason for Consultation: urinary retention    Chief Complaint: \"I did not know I had trouble voiding\"  HPI:  Maria E is a 89 y.o. female with DM, atrial fibrillation on eliquis, COPD, systolic CHF, moderate aortic stenosis who is admitted to [facility] with recurrent falls.  The patient was found to be in urinary retention requiring multiple straight caths for volumes of 400-500 mL of urine, a pollack was subsequently placed on 4/1/24 due to recurrent retention.  The patient is sleepy and not very conversant during history taking, but denies prior urologic issues.      Past Medical History:   Diagnosis Date    Diabetes (HCC)     Osteoarthrosis 6/20/2013       Past Surgical History:   Procedure Laterality Date    KNEE SURGERY         Medication List reviewed:     Current Facility-Administered Medications   Medication Dose Route Frequency Provider Last Rate Last Admin    meclizine (ANTIVERT) tablet 25 mg  25 mg Oral TID PRN Stella Ybarra APRN - CNP        tamsulosin (FLOMAX) capsule 0.4 mg  0.4 mg Oral Daily Stella Ybarra APRN - CNP   0.4 mg at 04/01/24 0920    lisinopril (PRINIVIL;ZESTRIL) tablet 5 mg  5 mg Oral Daily Stella Ybarra APRN - CNP   5 mg at 04/01/24 0921    sodium chloride flush 0.9 % injection 5-40 mL  5-40 mL IntraVENous 2 times per day Zachary Harmon MD   10 mL at 04/01/24 0921    sodium chloride flush 0.9 % injection 5-40 mL  5-40 mL IntraVENous PRN Zachary Harmon MD        0.9 % sodium chloride infusion   IntraVENous PRN Zachary Harmon  mL/hr at 04/01/24 0955 25 mL at 04/01/24 0955    potassium chloride (KLOR-CON M) extended release tablet 40 mEq  40 mEq Oral PRZachary Shabazz MD        Or    potassium bicarb-citric acid (EFFER-K) effervescent tablet 40 mEq  40 mEq Oral PRZachary Shabazz MD        Or    potassium chloride 10 mEq/100 mL IVPB (Peripheral Line)  10 mEq IntraVENous PRZachary Shabazz MD         injection 1 mg  1 mg SubCUTAneous PRN Zachary Harmon MD        dextrose 10 % infusion   IntraVENous Continuous PRN Zachary Harmon MD           No Known Allergies    Family History   Problem Relation Age of Onset    Cancer Mother     Diabetes Sister     Anesth Problems Neg Hx     Broken Bones Neg Hx     Clotting Disorder Neg Hx     Collagen Disease Neg Hx     Dislocations Neg Hx     Osteoporosis Neg Hx     Rheumatologic Disease Neg Hx     Scoliosis Neg Hx     Severe Sprains Neg Hx        Social History     Tobacco Use    Smoking status: Never    Smokeless tobacco: Never   Substance Use Topics    Alcohol use: No    Drug use: No         Review of Systems: A 12 point ROS was performed and was unremarkable unless listed in the history of present illness.      I/O last 3 completed shifts:  In: 960 [P.O.:960]  Out: 2560 [Urine:2560]    Vitals:  /63   Pulse 80   Temp 97.6 °F (36.4 °C) (Oral)   Resp 16   Ht 1.6 m (5' 3\")   Wt 84.2 kg (185 lb 11.2 oz)   SpO2 94%   BMI 32.90 kg/m²   Temp  Av.6 °F (36.4 °C)  Min: 97.2 °F (36.2 °C)  Max: 98 °F (36.7 °C)    Intake/Output Summary (Last 24 hours) at 2024 1029  Last data filed at 2024 1006  Gross per 24 hour   Intake 720 ml   Output 1710 ml   Net -990 ml         Physical:  Well developed, well nourished in no acute distress  Mood indicates no abnormalities. Pt doesn’t appear depressed  Orientated to time and place  Neck is supple, trachea is midline  Respiratory effort is normal  Abdomen soft, not tender, not distended   Skin show no abnormal lesions    Female :   Mark draining yellow urine       Labs:  WBC:    Lab Results   Component Value Date/Time    WBC 7.7 2024 08:25 AM     Hemoglobin/Hematocrit:    Lab Results   Component Value Date/Time    HGB 12.4 2024 08:25 AM    HCT 37.6 2024 08:25 AM     BMP:    Lab Results   Component Value Date/Time     2024 08:25 AM    K 4.3 2024 08:25 AM    K 4.7 2024 01:00

## 2024-04-02 PROBLEM — I50.41 ACUTE COMBINED SYSTOLIC AND DIASTOLIC CONGESTIVE HEART FAILURE (HCC): Status: ACTIVE | Noted: 2024-04-02

## 2024-04-02 PROBLEM — R29.90 STROKE-LIKE SYMPTOMS: Status: ACTIVE | Noted: 2024-04-02

## 2024-04-02 PROBLEM — E78.00 HYPERCHOLESTEROLEMIA: Status: ACTIVE | Noted: 2024-04-02

## 2024-04-02 PROBLEM — I48.21 PERMANENT ATRIAL FIBRILLATION (HCC): Status: ACTIVE | Noted: 2024-04-02

## 2024-04-02 LAB
ANION GAP SERPL CALCULATED.3IONS-SCNC: 12 MMOL/L (ref 3–16)
BASOPHILS # BLD: 0 K/UL (ref 0–0.2)
BASOPHILS NFR BLD: 0.7 %
BUN SERPL-MCNC: 22 MG/DL (ref 7–20)
CALCIUM SERPL-MCNC: 8.9 MG/DL (ref 8.3–10.6)
CHLORIDE SERPL-SCNC: 100 MMOL/L (ref 99–110)
CO2 SERPL-SCNC: 26 MMOL/L (ref 21–32)
CREAT SERPL-MCNC: 1 MG/DL (ref 0.6–1.2)
DEPRECATED RDW RBC AUTO: 17.1 % (ref 12.4–15.4)
EOSINOPHIL # BLD: 0.3 K/UL (ref 0–0.6)
EOSINOPHIL NFR BLD: 4 %
GFR SERPLBLD CREATININE-BSD FMLA CKD-EPI: 54 ML/MIN/{1.73_M2}
GLUCOSE BLD-MCNC: 182 MG/DL (ref 70–99)
GLUCOSE BLD-MCNC: 202 MG/DL (ref 70–99)
GLUCOSE BLD-MCNC: 229 MG/DL (ref 70–99)
GLUCOSE BLD-MCNC: 285 MG/DL (ref 70–99)
GLUCOSE SERPL-MCNC: 192 MG/DL (ref 70–99)
HCT VFR BLD AUTO: 38.9 % (ref 36–48)
HGB BLD-MCNC: 12.5 G/DL (ref 12–16)
LYMPHOCYTES # BLD: 1 K/UL (ref 1–5.1)
LYMPHOCYTES NFR BLD: 14 %
MAGNESIUM SERPL-MCNC: 1.7 MG/DL (ref 1.8–2.4)
MCH RBC QN AUTO: 29.9 PG (ref 26–34)
MCHC RBC AUTO-ENTMCNC: 32.2 G/DL (ref 31–36)
MCV RBC AUTO: 92.8 FL (ref 80–100)
MONOCYTES # BLD: 0.5 K/UL (ref 0–1.3)
MONOCYTES NFR BLD: 6.9 %
NEUTROPHILS # BLD: 5.3 K/UL (ref 1.7–7.7)
NEUTROPHILS NFR BLD: 74.4 %
PERFORMED ON: ABNORMAL
PLATELET # BLD AUTO: 223 K/UL (ref 135–450)
PMV BLD AUTO: 8 FL (ref 5–10.5)
POTASSIUM SERPL-SCNC: 4.4 MMOL/L (ref 3.5–5.1)
RBC # BLD AUTO: 4.19 M/UL (ref 4–5.2)
SODIUM SERPL-SCNC: 138 MMOL/L (ref 136–145)
WBC # BLD AUTO: 7.1 K/UL (ref 4–11)

## 2024-04-02 PROCEDURE — 97530 THERAPEUTIC ACTIVITIES: CPT

## 2024-04-02 PROCEDURE — 92523 SPEECH SOUND LANG COMPREHEN: CPT

## 2024-04-02 PROCEDURE — 85025 COMPLETE CBC W/AUTO DIFF WBC: CPT

## 2024-04-02 PROCEDURE — 99232 SBSQ HOSP IP/OBS MODERATE 35: CPT | Performed by: INTERNAL MEDICINE

## 2024-04-02 PROCEDURE — 94761 N-INVAS EAR/PLS OXIMETRY MLT: CPT

## 2024-04-02 PROCEDURE — 80048 BASIC METABOLIC PNL TOTAL CA: CPT

## 2024-04-02 PROCEDURE — 36415 COLL VENOUS BLD VENIPUNCTURE: CPT

## 2024-04-02 PROCEDURE — 97535 SELF CARE MNGMENT TRAINING: CPT

## 2024-04-02 PROCEDURE — 94640 AIRWAY INHALATION TREATMENT: CPT

## 2024-04-02 PROCEDURE — 6370000000 HC RX 637 (ALT 250 FOR IP): Performed by: NURSE PRACTITIONER

## 2024-04-02 PROCEDURE — 6370000000 HC RX 637 (ALT 250 FOR IP): Performed by: HOSPITALIST

## 2024-04-02 PROCEDURE — 84443 ASSAY THYROID STIM HORMONE: CPT

## 2024-04-02 PROCEDURE — 6370000000 HC RX 637 (ALT 250 FOR IP): Performed by: INTERNAL MEDICINE

## 2024-04-02 PROCEDURE — G0378 HOSPITAL OBSERVATION PER HR: HCPCS

## 2024-04-02 PROCEDURE — 97116 GAIT TRAINING THERAPY: CPT

## 2024-04-02 PROCEDURE — 99233 SBSQ HOSP IP/OBS HIGH 50: CPT | Performed by: PSYCHIATRY & NEUROLOGY

## 2024-04-02 PROCEDURE — 92610 EVALUATE SWALLOWING FUNCTION: CPT

## 2024-04-02 PROCEDURE — 1200000000 HC SEMI PRIVATE

## 2024-04-02 PROCEDURE — 83735 ASSAY OF MAGNESIUM: CPT

## 2024-04-02 PROCEDURE — 94760 N-INVAS EAR/PLS OXIMETRY 1: CPT

## 2024-04-02 PROCEDURE — 2580000003 HC RX 258: Performed by: HOSPITALIST

## 2024-04-02 PROCEDURE — 97110 THERAPEUTIC EXERCISES: CPT

## 2024-04-02 RX ORDER — CHLORHEXIDINE GLUCONATE ORAL RINSE 1.2 MG/ML
15 SOLUTION DENTAL 2 TIMES DAILY
Status: DISCONTINUED | OUTPATIENT
Start: 2024-04-02 | End: 2024-04-03 | Stop reason: HOSPADM

## 2024-04-02 RX ORDER — FUROSEMIDE 20 MG/1
20 TABLET ORAL DAILY
Status: DISCONTINUED | OUTPATIENT
Start: 2024-04-02 | End: 2024-04-03 | Stop reason: HOSPADM

## 2024-04-02 RX ORDER — SPIRONOLACTONE 25 MG/1
25 TABLET ORAL DAILY
Status: DISCONTINUED | OUTPATIENT
Start: 2024-04-02 | End: 2024-04-03 | Stop reason: HOSPADM

## 2024-04-02 RX ORDER — ASPIRIN 81 MG/1
81 TABLET, CHEWABLE ORAL DAILY
Status: DISCONTINUED | OUTPATIENT
Start: 2024-04-02 | End: 2024-04-03 | Stop reason: HOSPADM

## 2024-04-02 RX ADMIN — Medication 3 MG: at 20:55

## 2024-04-02 RX ADMIN — CHLORHEXIDINE GLUCONATE 15 ML: 1.2 RINSE ORAL at 20:55

## 2024-04-02 RX ADMIN — ASPIRIN 81 MG: 81 TABLET, CHEWABLE ORAL at 09:41

## 2024-04-02 RX ADMIN — SODIUM CHLORIDE, PRESERVATIVE FREE 10 ML: 5 INJECTION INTRAVENOUS at 09:04

## 2024-04-02 RX ADMIN — ACETAMINOPHEN 650 MG: 325 TABLET ORAL at 23:26

## 2024-04-02 RX ADMIN — INSULIN LISPRO 4 UNITS: 100 INJECTION, SOLUTION INTRAVENOUS; SUBCUTANEOUS at 18:12

## 2024-04-02 RX ADMIN — SPIRONOLACTONE 25 MG: 25 TABLET ORAL at 18:13

## 2024-04-02 RX ADMIN — ATORVASTATIN CALCIUM 20 MG: 10 TABLET, FILM COATED ORAL at 09:03

## 2024-04-02 RX ADMIN — SODIUM CHLORIDE, PRESERVATIVE FREE 10 ML: 5 INJECTION INTRAVENOUS at 20:55

## 2024-04-02 RX ADMIN — INSULIN LISPRO 2 UNITS: 100 INJECTION, SOLUTION INTRAVENOUS; SUBCUTANEOUS at 09:03

## 2024-04-02 RX ADMIN — BUPROPION HYDROCHLORIDE 300 MG: 150 TABLET, EXTENDED RELEASE ORAL at 09:02

## 2024-04-02 RX ADMIN — LISINOPRIL 5 MG: 5 TABLET ORAL at 09:03

## 2024-04-02 RX ADMIN — DULOXETINE HYDROCHLORIDE 60 MG: 60 CAPSULE, DELAYED RELEASE ORAL at 09:02

## 2024-04-02 RX ADMIN — METOPROLOL SUCCINATE 25 MG: 25 TABLET, FILM COATED, EXTENDED RELEASE ORAL at 09:03

## 2024-04-02 RX ADMIN — FUROSEMIDE 20 MG: 20 TABLET ORAL at 18:13

## 2024-04-02 RX ADMIN — APIXABAN 2.5 MG: 2.5 TABLET, FILM COATED ORAL at 09:03

## 2024-04-02 RX ADMIN — TIOTROPIUM BROMIDE AND OLODATEROL 2 PUFF: 3.124; 2.736 SPRAY, METERED RESPIRATORY (INHALATION) at 07:30

## 2024-04-02 RX ADMIN — APIXABAN 2.5 MG: 2.5 TABLET, FILM COATED ORAL at 20:54

## 2024-04-02 RX ADMIN — MECLIZINE 25 MG: 12.5 TABLET ORAL at 20:56

## 2024-04-02 RX ADMIN — CHLORHEXIDINE GLUCONATE 15 ML: 1.2 RINSE ORAL at 11:07

## 2024-04-02 ASSESSMENT — PAIN SCALES - GENERAL: PAINLEVEL_OUTOF10: 1

## 2024-04-02 ASSESSMENT — PAIN DESCRIPTION - LOCATION: LOCATION: BACK

## 2024-04-02 NOTE — PROGRESS NOTES
[facility] Daily Progress Note      Admit Date:  3/30/2024    Subjective:  Ms. Valdez is seen for cardiology for low EF     [place]  Permanent afib on AC, COPD, HBP HLD reduced LVEF DM Acute dorsal jm CVA this admission  Poor balance and falls. Denies angina shortness of breath palpitation or syncope.  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    ROS:  12 point ROS negative in all areas as listed below except as in [place]  Constitutional, EENT, pulmonary, GI, , Musculoskeletal, skin, hematological, endocrine, Psychiatric    Past Medical History:   Diagnosis Date    Diabetes (HCC)     Osteoarthrosis 6/20/2013     Past Surgical History:   Procedure Laterality Date    KNEE SURGERY         Objective:   /73   Pulse 97   Temp 97.5 °F (36.4 °C) (Oral)   Resp 18   Ht 1.6 m (5' 3\")   Wt 84.5 kg (186 lb 3.2 oz)   SpO2 92%   BMI 32.98 kg/m²     Intake/Output Summary (Last 24 hours) at 4/2/2024 1642  Last data filed at 4/2/2024 0942  Gross per 24 hour   Intake 957 ml   Output 350 ml   Net 607 ml       TELEMETRY: Atrial fibrillation     Physical Exam:  General: No Respiratory distress, appears well developed and well nourished.   Eyes:  Sclera nonicteric  Nose/Sinuses:  negative findings: nose shows no deformity, asymmetry, or inflammation, nasal mucosa normal, septum midline with no perforation or bleeding  Back:  no pain to palpation  Joint:  no active joint inflammation  Musculoskeletal:  negative  Skin:  Warm and dry  Neck:  Negative for JVD and Carotid Bruits.   Chest:  Clear to auscultation, respiration easy  Cardiovascular:  RRR, S1S2 normal, no murmur, no rub or thrill.  Abdomen:  Soft normal liver and spleen  Extremities:   2 + pretibial left 1+ rt pretibial edema,  no clubbing, cyanosis,  Pulses:  pedal pulses are   1+ bilat .  Neuro:  no focal weakness    Medications:    aspirin  81 mg Oral Daily    chlorhexidine  15 mL Mouth/Throat BID    lisinopril  5 mg Oral

## 2024-04-02 NOTE — CONSULTS
Consult Placed     Who: GISELA KELLEY   Date:4/2/2024  Time:1049     Electronically signed by Ravinder Mata on 4/2/2024 at 10:49 AM

## 2024-04-02 NOTE — PROGRESS NOTES
Physical Therapy  Facility/Department: [facility] B3 - MED SURG  Daily Treatment Note  NAME: Maria E Valdez  : 8/3/1934  MRN: [id number]    Date of Service: 2024    Discharge Recommendations:  IP Rehab   PT Equipment Recommendations  Equipment Needed: No    Therapy discharge recommendations take into account each patient's current medical complexities and are subject to input/oversight from the patient's healthcare team.   Barriers to Home Discharge:   [] Steps to access home entry or bed/bath:   [x] Unable to transfer, ambulate, or propel wheelchair household distances without assist   [x] Limited available assist at home upon discharge    [] Patient or family requests d/c to post-acute facility    [x] Poor cognition/safety awareness for d/c to home alone    []Unable to maintain ordered weight bearing status    [] Patient with salient signs of long-standing immobility   [x] Patient is at risk for falls    [] Other:    If pt is unable to be seen after this session, please let this note serve as discharge summary.  Please see case management note for discharge disposition.  Thank you.    Patient Diagnosis(es): The primary encounter diagnosis was Injury of head, initial encounter. Diagnoses of Fall, initial encounter, Anticoagulated by anticoagulation treatment, Dizziness, and Elevated troponin were also pertinent to this visit.    Assessment   Assessment: Pt presents to [facility] for dizziness. Pt presents below baseline for PT eval with decreased strength, decreased endurance, and decreased mobility. Pt presentsPt would continue to benefit from skilled PT to aid in the above deficits and a safe DC IPR/CTA when medically appropriate.  Activity Tolerance: Patient tolerated evaluation without incident;Patient tolerated treatment well  Equipment Needed: No              Plan    Physical Therapy Plan  General Plan: 3-5 times per week  Current Treatment Recommendations: Strengthening;Balance training;Functional mobility  seek provider input and evaluation.  Educated patient in :  []Supine    Level 1: Bed Exercise 10-15 reps, 2x/day  []Ankle Pumps  []Heel Slides  []Hip Abduction  []Buttocks Squeeze  []Diaphragmatic Breathing with TA set  []Shoulder Shrugs  []Bicep Curl  []Hands open/close                          [x]Sitting    Level 2: Seated Exercises 10-15 reps, 2x/day  [x]Toe raise/heel raise  [x]Long Arc Quad  [x]Seated March  [x]Seated Clamshell  []Diaphragmatic Breathing with TA set and BUE ER and IR  []Shoulder Shrugs  []Bicep Curls  []Hands open/close                         []Standing   Level 3: Standing Exercises 10-15 reps, 2x/day     []Sit to/from stand  []Standing march  []Standing side steps  []Standing bilateral heel raise  []Diaphragmatic breathing with TA set and BUE flex/ext  []Shoulder Shrugs  []Bicep Curls  []Hands open/close                        []WalkingLevel 1: Educated patient in initiating walking when in the Green zone and to Start with 2-5 minutes daily and work up to 10 minutes per day. Walk at a slow, comfortable pace with your exertion level Very Light (LEONARD 9) to Light (LEONARD 11)   You should be able to comfortably hold a conversation while walking.        4)Daily Weight check/Stepping on Scale  [x]Pt educated in importance of checking body weight daily and []demonstrate/[x]verbalizes safety in:stepping up to weigh self and complete downward gaze/head nod to read scale on standard scale  and safety stepping off scale.  []Barriers to completion of Daily weight check:       5)Pt voices and demonstrates appropriate teach back of Heart Failure Education Program with : use of the   [x]1. Identifying Appropriate Zone from HF Zone Self-Check Plan                          [x]2. Rating self on LEONARD.  []3.Therapeutic exercise/walking program      [x]4. Importance of taking daily weight measurement             []5. Safely stepping on and off scale    [x]Pt will benefit from reinforcement of education due to

## 2024-04-02 NOTE — PLAN OF CARE
Bedside swallow evaluation completed this date.    Chanda Jc M.S. CCC-SLP  Speech-language pathologist  [id number]      Speech/lang/cog evaluation completed this date.    Chanda Jc M.S. CCC-SLP  Speech-language pathologist  [id number]

## 2024-04-02 NOTE — PROGRESS NOTES
Speech Language Pathology  Clinical Bedside Swallow Assessment  Facility/Department: [facility] B3 - MED SURG        Recommendations:  Diet recommendation: IDDSI 7 Regular Solids; IDDSI 0 Thin Liquids; Meds PO as tolerated  Instrumentation: Pt may benefit from completion of instrumental swallow study to correlate clinical findings, particularly in light of location of pt's acute infarct (R jm).  Limited assessment this date -- will continue to monitor.  Risk management: upright for all intake, stay upright for at least 30 mins after intake, small bites/sips, oral care 2-3x/day to reduce adverse affects in the event of aspiration, increase physical mobility as able, alternate bites/sips, slow rate of intake, general GERD precautions, general aspiration precautions, and hold PO and contact SLP if s/s of aspiration or worsening respiratory status develop.      NAME:Maria E Valdez  : 8/3/1934 (89 y.o.)   MRN: [id number]  ROOM: [address] [place] [address]  ADMISSION DATE: 3/30/2024  PATIENT DIAGNOSIS(ES): Dizziness [R42]  Elevated troponin [R79.89]  Anticoagulated by anticoagulation treatment [Z79.01]  Injury of head, initial encounter [S09.90XA]  Fall, initial encounter [W19.XXXA]  Stroke-like symptoms [R29.90]  Chief Complaint   Patient presents with    Fall     Patient comes from [place], got dizzy and fell from standing height. Hit head on woodwork in the kitchen on the wall. No LOC, on blood thinners. Contusion to back of head.     Patient Active Problem List    Diagnosis Date Noted    Stroke-like symptoms 2024    Dizziness 2024    Elevated troponin 2024    Frequent falls 2024    Acute idiopathic gout of left foot 2016    Anxiety 2016    Restless legs syndrome 11/10/2015    Strain of tibialis anterior muscle 2015    Lumbar strain 2015    Hypertension 2015    Elevated lipids 2015    Controlled type 2 diabetes mellitus with hyperglycemia, with long-term current use of  PO Trials     [] Ice Chips   [] Other:  [x] FEES                                                 Dysphagia Therapeutic Intervention:   []  Bolus control Exercises  []  Oral Motor Exercises  []  Frankel Water Protocol  []  Thermal Stimulation  []  Oral Care    []  Vital Stim/NMES  []  Laryngeal Exercises  [x]  Patient/Family Education  []  Pharyngeal Exercises  []  Therapeutic PO trials with SLP  [x]  Diet tolerance monitoring  []  Other:     Referrals:   [x] ENT    []   [] Pulmonology [] GI  [] Neurology  [] RD  [] OT/ PT  [] N/A    Goals:  Short Term Goals:  Timeframe for Short Term Goals: (5 days, 4/7/24)  Goal 1: The patient will tolerate recommended diet with no clinical s/s of aspiration 5/5  Goal 2: The patient will tolerate instrumental assessment when able   Goal 3: The patient/caregiver will demonstrate understanding of compensatory swallow strategies, for improved swallow safety    Long Term Goals:   Timeframe for Long Term Goals: (7 days, 4/9/24)  Goal 1: The patient will tolerate least restrictive diet with no clinical s/s of aspiration or worsening respiratory/pulmonary status      Treatment:  Skilled instruction completed with patient re: evidenced based practice regarding recommendations and POC, importance of oral care to reduce adverse affects in the event of aspiration, and instruction of recommended compensatory strategies developed based upon clinical exam. Pt able to recall/demonstrate compensatory strategies with (mod) cues.       Pt Education: SLP educated the patient re: Role of SLP, rationale for completion of assessment, results of assessment, recommendations, and POC  Pt Education Response: verbalized understanding, would benefit from ongoing education, and RN aware    Duration/Frequency of Tx: 3-5x/week for LOS    Individuals Consulted:   [x]  Patient     []  NP         [x]  RN   []  RD                   []  MD      []  Family Member                        []  PA    []   Other:      Safety Devices / Report:   [x]  All fall risk precautions in place [x]  Safety handoff completed with RN  [x]  Bed alarm in place  [x]  Left in bed     []  Chair alarm in place  []  Left in chair   [x]  Call light in reach   []  Other:                       Total Treatment Time / Charges       Time in Time out Total Time / units   Swallow Eval/Tx Time  1515 1530 15 min / 1 unit     Signature:  Chanda Jc M.S. CCC-SLP  Speech-language pathologist  [id number]

## 2024-04-02 NOTE — PROGRESS NOTES
[]PPx LMWH  []SQ Heparin  []IPC/SCDs  [x]Eliquis  []Xarelto  []Coumadin  []Other -      Code status: Full Code  PT/OT Eval Status:   []NOT yet ordered  []Ordered and Pending   [x]Seen with Recommendations for:  []Home independently  []Home w/ assist  []HHC  []SNF  [x]Acute Rehab    Anticipated Discharge Day/Date:  4/2/2024    Anticipated Discharge Location: []Home  []HHC  []SNF  [x]Acute Rehab  []ECF  []LTAC  []Hospice  []Other -      Consults:      IP CONSULT TO HOSPITALIST  IP CONSULT TO CARDIOLOGY  IP CONSULT TO HEART FAILURE NURSE/COORDINATOR  IP CONSULT TO DIETITIAN  IP CONSULT TO UROLOGY  IP CONSULT TO NEUROLOGY  IP CONSULT TO CARDIOLOGY  IP CONSULT TO PHYSICAL MEDICINE REHAB      This patient has a high likelihood of being discharged tomorrow and is appropriate for A1 Discharge Unit in AM pending clinical course overnight: [x]Yes  []No    ------------------------------------------------------------------------------------------------------------------------------------------------------------------------    MDM    [x] High (any 2)    A. Problems (any 1)  [x] Acute/Chronic Illness/injury posing threat to life or bodily function:  Acute CVA with frequent falls with dizziness and severe lumbar stenosis  [] Severe exacerbation of chronic illness:    ---------------------------------------------------------------------  B. Risk of Treatment (any 1)   [x] Drugs/treatments that require intensive monitoring for toxicity include:  Insulin  [] Change in code status:    [] Decision to escalate care:    [] Major surgery/procedure with associated risk factors:    ----------------------------------------------------------------------  C. Data (any 2)  [x] Discussed current management and discharge planning options with Case Management.  [] Discussed management of the case with:    [] Telemetry personally reviewed and interpreted as documented above    [x] Imaging personally reviewed and interpreted, includes: CXR  [x] Data  Review (any 3)  [x] Collateral history obtained from:  EMR  [] All available Consultant notes from yesterday/today were reviewed  [x] All current labs were reviewed and interpreted for clinical significance   [x] Appropriate follow-up labs were ordered    Medications:  Personally reviewed in detail in conjunction w/ labs as documented for evidence of drug toxicity.     Infusion Medications    sodium chloride 25 mL (04/01/24 0955)    dextrose       Scheduled Medications    aspirin  81 mg Oral Daily    chlorhexidine  15 mL Mouth/Throat BID    lisinopril  5 mg Oral Daily    sodium chloride flush  5-40 mL IntraVENous 2 times per day    apixaban  2.5 mg Oral BID    atorvastatin  20 mg Oral Daily    buPROPion  300 mg Oral QAM    DULoxetine  60 mg Oral Daily    tiotropium-olodaterol  2 puff Inhalation Daily    metoprolol succinate  25 mg Oral Daily    insulin lispro  0-8 Units SubCUTAneous TID WC    insulin lispro  0-4 Units SubCUTAneous Nightly     PRN Meds: meclizine, sodium chloride flush, sodium chloride, potassium chloride **OR** potassium alternative oral replacement **OR** potassium chloride, magnesium sulfate, ondansetron **OR** ondansetron, polyethylene glycol, acetaminophen **OR** acetaminophen, melatonin, glucose, dextrose bolus **OR** dextrose bolus, glucagon (rDNA), dextrose     Labs:  Personally reviewed and interpreted for clinical significance.     Recent Labs     03/31/24  0608 04/01/24  0825 04/02/24  0600   WBC 6.6 7.7 7.1   HGB 12.3 12.4 12.5   HCT 37.6 37.6 38.9    227 223       Recent Labs     03/31/24  0608 04/01/24  0825 04/02/24  0600   * 137 138   K 3.9 4.3 4.4   CL 99 100 100   CO2 26 28 26   BUN 15 16 22*   CREATININE 0.8 1.0 1.0   CALCIUM 8.7 8.8 8.9   MG  --  1.50* 1.70*       Recent Labs     03/30/24  1915 03/30/24  2142 03/31/24  0931   PROBNP  --   --  2,915*   TROPHS 32* 31* 32*       No results for input(s): \"LABA1C\" in the last 72 hours.  No results for input(s): \"AST\",  \"ALT\", \"BILIDIR\", \"BILITOT\", \"ALKPHOS\" in the last 72 hours.    No results for input(s): \"INR\", \"LACTA\", \"TSH\" in the last 72 hours.      Urine Cultures:   Lab Results   Component Value Date/Time    LABURIN No growth at 18 to 36 hours 03/31/2024 03:40 PM     Blood Cultures: No results found for: \"BC\"  No results found for: \"BLOODCULT2\"  Organism: No results found for: \"ORG\"      Stella Ybarra, APRN - CNP

## 2024-04-02 NOTE — PROGRESS NOTES
Neurology Progress Note    ID: Maria E Valdez is a 89 y.o. female    : 8/3/1934     LOS: 0 days     ASSESSMENT    1.  Frequent falls.  2.  Tremor.  3.  Chronic diabetes.  4.  S/p left total knee replacement.  5.  Chronic anticoagulation use with atrial fibrillation.  6.  Acute right pontine infarction.     The patient has evidence of tremor and mild degree of parkinsonism.  The CTA of head and neck showed no significant hemodynamic stenosis.  The CT brain also showed no evidence of acute infarction.  MRI brain showed acute right pontine infarction.     From neurology perspective, the etiology of the stroke is likely small vessel disease.     Plan:     1.  Continue Eliquis and statin.  2.  Recommend baby aspirin if there is no contraindication.  3.  PT/OT/ST.  4.  The target blood pressure below 140/90.  5.  Avoid hypotension.  6.  Continue cardiac telemetry.    The patient is a good candidate for ARU.    Medications:  Scheduled Meds:    lisinopril  5 mg Oral Daily    sodium chloride flush  5-40 mL IntraVENous 2 times per day    apixaban  2.5 mg Oral BID    atorvastatin  20 mg Oral Daily    buPROPion  300 mg Oral QAM    DULoxetine  60 mg Oral Daily    tiotropium-olodaterol  2 puff Inhalation Daily    metoprolol succinate  25 mg Oral Daily    insulin lispro  0-8 Units SubCUTAneous TID WC    insulin lispro  0-4 Units SubCUTAneous Nightly     Continuous Infusions:    sodium chloride 25 mL (24 0955)    dextrose       PRN Meds: meclizine, sodium chloride flush, sodium chloride, potassium chloride **OR** potassium alternative oral replacement **OR** potassium chloride, magnesium sulfate, ondansetron **OR** ondansetron, polyethylene glycol, acetaminophen **OR** acetaminophen, melatonin, glucose, dextrose bolus **OR** dextrose bolus, glucagon (rDNA), dextrose    OBJECTIVE  Vital signs in the last 24 hours:  Vitals:    24 0911   BP: (!) 130/50   Pulse:    Resp:    Temp:    SpO2:        Intake/Output last 3

## 2024-04-02 NOTE — PLAN OF CARE
CHF Care Plan      Patient's EF (Ejection Fraction) is less than 40%    Heart Failure Medications:  Diuretics:: None    (One of the following REQUIRED for EF </= 40%/SYSTOLIC FAILURE but MAY be used in EF% >40%/DIASTOLIC FAILURE)        ACE:: Lisinopril        ARB:: None         ARNI:: None    (Beta Blockers)  NON- Evidenced Based Beta Blocker (for EF% >40%/DIASTOLIC FAILURE):     Evidenced Based Beta Blocker::(REQUIRED for EF% <40%/SYSTOLIC FAILURE) Metoprolol SUCCinate- Toprol XL  ...................................................................................................................................................    Failed to redirect to the Timeline version of the [place] [address] Smart[employer].      Patient's weights and intake/output reviewed    Daily Weight log at bedside, patient/family participation in use of log: \"yes    Patient's current weight today shows a difference of 1 lbs less than last documented weight.      Intake/Output Summary (Last 24 hours) at 4/2/2024 1154  Last data filed at 4/2/2024 0942  Gross per 24 hour   Intake 1197 ml   Output 350 ml   Net 847 ml       Education Booklet Provided: yes    Comorbidities Reviewed Yes    Patient has a past medical history of Diabetes (HCC) and Osteoarthrosis.     >>For CHF and Comorbidity documentation on Education Time and Topics, please see Education Tab      Pt up in chair at this time on room air. Pt denies shortness of breath. Pt with lower extremity edema.     Patient and/or Family's stated Goal of Care this Admission: reduce shortness of breath, increase activity tolerance, better understand heart failure and disease management, be more comfortable, and reduce lower extremity edema prior to discharge        :

## 2024-04-02 NOTE — CONSULTS
Consult Placed     Who: [facility]  Date:4/2/2024  Time:0942     Electronically signed by Ravinder Mata on 4/2/2024 at 9:42 AM

## 2024-04-02 NOTE — PROGRESS NOTES
Speech Language Pathology  Facility/Department: [facility] B3 - MED SURG  Initial Speech/Language/Cognitive Assessment       NAME:Maria E Valdez  : 8/3/1934 (89 y.o.)   MRN: [id number]  ROOM: [id number]/[id number]  ADMISSION DATE: 3/30/2024  PATIENT DIAGNOSIS(ES): Dizziness [R42]  Elevated troponin [R79.89]  Anticoagulated by anticoagulation treatment [Z79.01]  Injury of head, initial encounter [S09.90XA]  Fall, initial encounter [W19.XXXA]  Stroke-like symptoms [R29.90]  Patient Active Problem List    Diagnosis Date Noted    Stroke-like symptoms 2024    Dizziness 2024    Elevated troponin 2024    Frequent falls 2024    Acute idiopathic gout of left foot 2016    Anxiety 2016    Restless legs syndrome 11/10/2015    Strain of tibialis anterior muscle 2015    Lumbar strain 2015    Hypertension 2015    Elevated lipids 2015    Controlled type 2 diabetes mellitus with hyperglycemia, with long-term current use of insulin (HCC) 2015    Metatarsalgia 10/03/2014    Dermatophytosis of nail 10/22/2013    Diabetic polyneuropathy (HCC) 10/22/2013    Type 2 diabetes mellitus with diabetic polyneuropathy (HCC) 10/22/2013    Chronic airway obstruction (HCC) 2013    Osteoarthritis 2013    Chronic obstructive pulmonary disease (HCC) 2013    Nontoxic multinodular goiter 2008    Multinodular goiter 2008     Past Medical History:   Diagnosis Date    Diabetes (HCC)     Osteoarthrosis 2013     Past Surgical History:   Procedure Laterality Date    KNEE SURGERY       No Known Allergies    Date of Evaluation: 2024   Evaluating Therapist: Chanda Jc SLP    Subjective:   Chart Reviewed: : [x] Yes [] No    Onset Date: pt admitted to [facility] on 3/30/24    Recent Results of MRI Brain:  Date: 24    IMPRESSION:  1.  Acute 4 mm infarct in the dorsal right jm.     2.  Mild to moderate chronic small vessel ischemic disease.     3.  Old infarcts in  strategies for improved safety and independence.     Short Term Goals:  Time Frame for Short Term Goals: 5 days, 4/7/24  Goal 1: The pt will be oriented x3, min cues.  Goal 2: The pt will complete functional problem-solving tasks with 80% accuracy, mod cues  Goal 3: The pt will complete graded recall tasks with 70% accuracy, mod cues.        Objective:   Cranial nerve / Oral motor exam:   CN V (trigeminal): ophthalmic, maxillary, and mandibular facial sensation- WFL  CN VII (facial): Adequate  CN IX/X (glossopharyngeal/vagus): MPT: DNT; pitch range: Reduced; vocal quality: hoarse and weak; cough: Weak- perceptually, Non-Productive, and Dry  CN XII (hypoglossal): WFL      Dentition: intact    Motor Speech: WFL      Comprehension:   Auditory Comprehension: Moderate  Deficits: Two-step Commands, Multi-step commands, Complex/Abstract Commands, and Conversation     Reading Comprehension: To be assessed        Expression:   Primary Mode of Expression: Verbal  Verbal Expression: Mild  Deficits: Divergent    Written Expression: To be assessed;     Pragmatics/Social Functioning: Moderate  Deficits: Topic Maintenance      Cognition:  Overall Orientation : Moderate   Deficits: Disoriented to situation and Disoriented to place    Attention: Moderate   Deficits: Selective Attention and Sustained Attention    Memory: Moderate   Deficits: Short-term Memory    Problem Solving: To be assessed   Deficits: Managing Finances, Managing Medications, Simple Functional Tasks, and Verbal Reasoning    Numeric Reasoning: Moderate   Deficits: Calculations, Money Management, and Time    Abstract Reasoning: Moderate   Deficits: Divergent Thinking    Safety/Judgement: Moderate   Deficits:: Unable to self-monitor and self-correct consistently , Insight, Impulsive, and Flexibility of thought    Voice:   Voice: Hoarse, weak; pt endorsed voice changes recently.  Pt may benefit from ENT consult to further assess vocal fold function and

## 2024-04-02 NOTE — CONSULTS
Patient: Maria E Valdez  [id number]  Date: 4/2/2024      Chief Complaint: impaired balance, falls    History of Present Illness/Hospital Course:  Maria E Valdez is an 89 year old female with a past medical history significant for permanent atrial fibrillation on AC, COPD, HFrEF, DM, HTN, and OA who presented to [facility] on 3/30/24 with dizziness and falls. Cardiology and Neurology were consulted. MRI brain revealed acute dorsal right jm CVA. Urology is consulted due to urinary retention and pollack was placed. She continues to have functional deficits below his baseline. Today Jenn is seen without family present. She reports problems with her balance and falls at home. She reports living in an assisted living facility and states that her granddaughter helps her make decisions. She is motivated to work with therapies and is interested in ARU.      has a past medical history of Diabetes (HCC) and Osteoarthrosis.     has a past surgical history that includes knee surgery.     reports that she has never smoked. She has never used smokeless tobacco. She reports that she does not drink alcohol and does not use drugs.    family history includes Cancer in her mother; Diabetes in her sister.      REVIEW OF SYSTEMS:   CONSTITUTIONAL: negative for fevers, chills, diaphoresis, activity change, appetite change, fatigue, night sweats and unexpected weight change.   EYES: negative for blurred vision, eye discharge, visual disturbance and icterus  HEENT: negative for hearing loss, tinnitus, ear drainage, sinus pressure, nasal congestion, epistaxis and snoring  RESPIRATORY: Negative for hemoptysis, cough, sputum production  CARDIOVASCULAR: negative for chest pain, palpitations, exertional chest pressure/discomfort, edema, syncope  GASTROINTESTINAL: negative for nausea, vomiting, diarrhea, constipation, blood in stool and abdominal pain  GENITOURINARY: negative for frequency, dysuria, urinary incontinence, decreased urine  volume, and hematuria  HEMATOLOGIC/LYMPHATIC: negative for easy bruising, bleeding and lymphadenopathy  ALLERGIC/IMMUNOLOGIC: negative for recurrent infections, angioedema, anaphylaxis and drug reactions  ENDOCRINE: negative for weight changes and diabetic symptoms including polyuria, polydipsia and polyphagia  MUSCULOSKELETAL: negative for pain, joint swelling, decreased range of motion and muscle weakness  NEUROLOGICAL: negative for headaches, slurred speech, unilateral weakness  PSYCHIATRIC/BEHAVIORAL: negative for hallucinations, behavioral problems, confusion and agitation.     Physical Examination:  Vitals: Patient Vitals for the past 24 hrs:   BP Temp Temp src Pulse Resp SpO2 Weight   04/02/24 1145 129/73 97.5 °F (36.4 °C) Oral 97 18 -- --   04/02/24 0911 (!) 130/50 -- -- -- -- -- --   04/02/24 0852 (!) 114/45 -- -- 94 -- 92 % --   04/02/24 0819 (!) 141/77 97.5 °F (36.4 °C) Oral 89 18 94 % --   04/02/24 0731 -- -- -- -- -- 92 % --   04/02/24 0524 122/60 97.6 °F (36.4 °C) Oral 76 18 94 % 84.5 kg (186 lb 3.2 oz)   04/02/24 0004 128/68 97.4 °F (36.3 °C) Oral 75 16 93 % --   04/01/24 2023 (!) 99/58 97.3 °F (36.3 °C) Oral 79 16 90 % --   04/01/24 1530 (!) 110/58 97.4 °F (36.3 °C) Oral 80 16 94 % --     Mood: Stable  Const: No distress  ENT: Oral mucosa moist  Eyes: No discharge or injection  CV: irregularly irregular rhythm  Resp: Lungs clear to auscultation bilaterally, no rales wheezes or ronchi  GI: Soft, nontender, nondistended. Normal bowel sounds.  Neuro: Alert, oriented, appropriate. No cranial nerve deficits appreciated. Sensation intact to light touch. Motor examination reveals normal strength in all four limbs diffusely.   Skin: No lesions or rashes noted.   MSK: No joint abnormalities noted.       Lab Results   Component Value Date    WBC 7.1 04/02/2024    HGB 12.5 04/02/2024    HCT 38.9 04/02/2024    MCV 92.8 04/02/2024     04/02/2024     Lab Results   Component Value Date    INR 1.24 (H)

## 2024-04-02 NOTE — CARE COORDINATION
Chart reviewed day 3. Care provided by neuro, cards and IM. Pt is from [facility]. She is relatively IPTA but has been having dizzy spells and falling. She is + for stroke. Spoke with pt and granddaughter and they are interested in ARU. Referral made. Pt likely to be ready for D/C tomorrow. Will continue to follow course for needs. Alba Peñaloza RN

## 2024-04-02 NOTE — PLAN OF CARE
CHF Care Plan      Patient's EF (Ejection Fraction) is greater than 40%    Heart Failure Medications:  Diuretics:: None    (One of the following REQUIRED for EF </= 40%/SYSTOLIC FAILURE but MAY be used in EF% >40%/DIASTOLIC FAILURE)        ACE:: Lisinopril        ARB:: None         ARNI:: None    (Beta Blockers)  NON- Evidenced Based Beta Blocker (for EF% >40%/DIASTOLIC FAILURE): None    Evidenced Based Beta Blocker::(REQUIRED for EF% <40%/SYSTOLIC FAILURE) Metoprolol SUCCinate- Toprol XL  ...................................................................................................................................................    Failed to redirect to the Timeline version of the [employer] SmartLink.      Patient's weights and intake/output reviewed    Daily Weight log at bedside, patient/family participation in use of log: \"yes    Patient's current weight today shows a difference of 1 lbs more than last documented weight.      Intake/Output Summary (Last 24 hours) at 4/2/2024 0556  Last data filed at 4/2/2024 0217  Gross per 24 hour   Intake 957 ml   Output 350 ml   Net 607 ml       Education Booklet Provided: yes    Comorbidities Reviewed Yes    Patient has a past medical history of Diabetes (HCC) and Osteoarthrosis.     >>For CHF and Comorbidity documentation on Education Time and Topics, please see Education Tab      Pt resting in bed at this time on room air. Pt denies shortness of breath. Pt with pitting lower extremity edema.     Patient and/or Family's stated Goal of Care this Admission: increase activity tolerance, better understand heart failure and disease management, be more comfortable, and reduce lower extremity edema prior to discharge        :   Problem: Discharge Planning  Goal: Discharge to home or other facility with appropriate resources  Outcome: Progressing     Problem: Safety - Adult  Goal: Free from fall injury  Outcome: Progressing     Problem: ABCDS Injury Assessment  Goal: Absence of  physical injury  Outcome: Progressing     Problem: Cardiovascular - Adult  Goal: Maintains optimal cardiac output and hemodynamic stability  Outcome: Progressing  Goal: Absence of cardiac dysrhythmias or at baseline  Outcome: Progressing     Problem: Skin/Tissue Integrity - Adult  Goal: Skin integrity remains intact  Outcome: Progressing     Problem: Musculoskeletal - Adult  Goal: Return mobility to safest level of function  Outcome: Progressing  Goal: Return ADL status to a safe level of function  Outcome: Progressing     Problem: Metabolic/Fluid and Electrolytes - Adult  Goal: Electrolytes maintained within normal limits  Outcome: Progressing  Goal: Glucose maintained within prescribed range  Outcome: Progressing     Problem: Respiratory - Adult  Goal: Achieves optimal ventilation and oxygenation  Outcome: Progressing     Problem: Genitourinary - Adult  Goal: Absence of urinary retention  Outcome: Progressing     Problem: Chronic Conditions and Co-morbidities  Goal: Patient's chronic conditions and co-morbidity symptoms are monitored and maintained or improved  Outcome: Progressing

## 2024-04-02 NOTE — PROGRESS NOTES
Occupational Therapy  Facility/Department: [facility] B3 - MED SURG  Daily Treatment Note  NAME: Maria E Valdez  : 8/3/1934  MRN: [id number]    Date of Service: 2024    Discharge Recommendations:  Subacute/Skilled Nursing Facility, IP Rehab  OT Equipment Recommendations  Equipment Needed: No  Other: Defer to next level of care    Therapy discharge recommendations are subject to collaboration from the patient’s interdisciplinary healthcare team, including MD and case management recommendations.    Barriers to Home Discharge:   [] Steps to access home entry or bed/bath:   [x] Unable to transfer, ambulate, or propel wheelchair household distances without assist   [x] Limited available assist at home upon discharge    [x] Patient or family requests d/c to post-acute facility    [x] Poor cognition/safety awareness for d/c to home alone    [] Unable to maintain ordered weight bearing status    [] Patient with salient signs of long-standing immobility   [x] Decreased independence with ADLs   [x] Increased risk for falls   [] Other:    If pt is unable to be seen after this session, please let this note serve as discharge summary.  Please see case management note for discharge disposition.  Thank you.    Patient Diagnosis(es): The primary encounter diagnosis was Injury of head, initial encounter. Diagnoses of Fall, initial encounter, Anticoagulated by anticoagulation treatment, Dizziness, and Elevated troponin were also pertinent to this visit.     Assessment    Assessment: Pt tolerated session well, performing sit<>stand transfers and toilet transfer at [facility] w/ CGA. She ambulated to/from bathroom w/ CGA and RW then sat at sink to perform grooming tasks w/ SBA. Educated pt on HF packet, pt able to verbalize understanding throughout at integrated energy conservation techniques into session by requesting to perform grooming tasks seated at sink. She continues to display decreased balance when turning during ambulation,  trips, Gather supplies and equipment needed before starting an activity.   Position: Avoid tiring and awkward posture that may impair breathing  Pace: Slow and steady pace, never rushing!  Pursed lip breathing.  Pursed Lip Breathing: \"Smell the roses, blow out the candles\"  ------------------------------------------------------------------------------------------------------------------------------------         4) Fluid intake tracking    Pt participated in the following fluid tracking management   [] Identifying 4, 8, and 12 ounces of fluid size  [] Identify mL to cup conversion  [] Understanding Jello, watermelon and Ice cream contributing to fluid intake   [] Acknowledge current fluid restriction limits/orders  []Pt required assistance or correction of error in the following:   [x] Not appropriate for patient  ------------------------------------------------------------------------------------------------------------------------------------           Patient Education  Education Given To: Patient  Education Provided: Role of Therapy;Plan of Care;ADL Adaptive Strategies;Transfer Training;Energy Conservation  Education Provided Comments: disease specific: role of OT, CHF packet  Education Method: Verbal;Printed Information/Hand-outs  Barriers to Learning: Hearing;Cognition  Education Outcome: Verbalized understanding;Continued education needed    Goals  Short Term Goals  Time Frame for Short Term Goals: Short term goals to be met by 4/08 unless otherwise specified  Short Term Goal 1: Pt will perform LB dressing w/ SBA and LRAD by 4/06  Short Term Goal 2: Pt will perform toileting tasks w/ SBA  Short Term Goal 3: Pt will perform functional/ADL transfers w/ SBA  Short Term Goal 4: Pt will tolerate x5 min stance at sink w/ RW to perform ADLs w/ SBA  Short Term Goal 5: Pt will tolerate x20 BUE ther ex  Short Term Goal 6: Pt to voice understanding with teach-back of 1/3 heart failure goals-GOAL MET    Patient Goals

## 2024-04-03 ENCOUNTER — HOSPITAL ENCOUNTER (INPATIENT)
Age: 89
DRG: 057 | End: 2024-04-03
Attending: STUDENT IN AN ORGANIZED HEALTH CARE EDUCATION/TRAINING PROGRAM | Admitting: STUDENT IN AN ORGANIZED HEALTH CARE EDUCATION/TRAINING PROGRAM
Payer: MEDICARE

## 2024-04-03 VITALS
WEIGHT: 186.5 LBS | SYSTOLIC BLOOD PRESSURE: 143 MMHG | RESPIRATION RATE: 14 BRPM | OXYGEN SATURATION: 97 % | TEMPERATURE: 97.5 F | HEART RATE: 80 BPM | DIASTOLIC BLOOD PRESSURE: 73 MMHG | HEIGHT: 63 IN | BODY MASS INDEX: 33.04 KG/M2

## 2024-04-03 PROBLEM — I63.9 CEREBROVASCULAR ACCIDENT (CVA) (HCC): Status: ACTIVE | Noted: 2024-04-03

## 2024-04-03 PROBLEM — Z79.01 ANTICOAGULATED BY ANTICOAGULATION TREATMENT: Status: ACTIVE | Noted: 2024-04-03
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
## 2024-04-03 PROBLEM — I50.43 ACUTE ON CHRONIC COMBINED SYSTOLIC AND DIASTOLIC CHF (CONGESTIVE HEART FAILURE) (HCC): Status: ACTIVE | Noted: 2024-04-02

## 2024-04-03 PROBLEM — S09.90XA HEAD INJURY: Status: ACTIVE | Noted: 2024-04-03

## 2024-04-03 PROBLEM — I69.30 LATE EFFECTS OF CEREBRAL ISCHEMIC STROKE: Status: ACTIVE | Noted: 2024-04-03

## 2024-04-03 LAB
ANION GAP SERPL CALCULATED.3IONS-SCNC: 11 MMOL/L (ref 3–16)
BASOPHILS # BLD: 0 K/UL (ref 0–0.2)
BASOPHILS NFR BLD: 0.6 %
BUN SERPL-MCNC: 20 MG/DL (ref 7–20)
CALCIUM SERPL-MCNC: 9.1 MG/DL (ref 8.3–10.6)
CHLORIDE SERPL-SCNC: 99 MMOL/L (ref 99–110)
CO2 SERPL-SCNC: 26 MMOL/L (ref 21–32)
CREAT SERPL-MCNC: 1 MG/DL (ref 0.6–1.2)
DEPRECATED RDW RBC AUTO: 16.9 % (ref 12.4–15.4)
EOSINOPHIL # BLD: 0.3 K/UL (ref 0–0.6)
EOSINOPHIL NFR BLD: 4.5 %
GFR SERPLBLD CREATININE-BSD FMLA CKD-EPI: 54 ML/MIN/{1.73_M2}
GLUCOSE BLD-MCNC: 174 MG/DL (ref 70–99)
GLUCOSE BLD-MCNC: 183 MG/DL (ref 70–99)
GLUCOSE BLD-MCNC: 250 MG/DL (ref 70–99)
GLUCOSE BLD-MCNC: 251 MG/DL (ref 70–99)
GLUCOSE SERPL-MCNC: 186 MG/DL (ref 70–99)
HCT VFR BLD AUTO: 37.8 % (ref 36–48)
HGB BLD-MCNC: 12.5 G/DL (ref 12–16)
LYMPHOCYTES # BLD: 1.2 K/UL (ref 1–5.1)
LYMPHOCYTES NFR BLD: 16.7 %
MAGNESIUM SERPL-MCNC: 1.4 MG/DL (ref 1.8–2.4)
MCH RBC QN AUTO: 30.3 PG (ref 26–34)
MCHC RBC AUTO-ENTMCNC: 33 G/DL (ref 31–36)
MCV RBC AUTO: 91.9 FL (ref 80–100)
MONOCYTES # BLD: 0.6 K/UL (ref 0–1.3)
MONOCYTES NFR BLD: 8.8 %
NEUTROPHILS # BLD: 4.9 K/UL (ref 1.7–7.7)
NEUTROPHILS NFR BLD: 69.4 %
PERFORMED ON: ABNORMAL
PLATELET # BLD AUTO: 231 K/UL (ref 135–450)
PMV BLD AUTO: 8 FL (ref 5–10.5)
POTASSIUM SERPL-SCNC: 4.1 MMOL/L (ref 3.5–5.1)
RBC # BLD AUTO: 4.11 M/UL (ref 4–5.2)
SARS-COV-2 RDRP RESP QL NAA+PROBE: NOT DETECTED
SODIUM SERPL-SCNC: 136 MMOL/L (ref 136–145)
TSH SERPL DL<=0.005 MIU/L-ACNC: 1.15 UIU/ML (ref 0.27–4.2)
WBC # BLD AUTO: 7 K/UL (ref 4–11)

## 2024-04-03 PROCEDURE — 99232 SBSQ HOSP IP/OBS MODERATE 35: CPT | Performed by: NURSE PRACTITIONER

## 2024-04-03 PROCEDURE — 94640 AIRWAY INHALATION TREATMENT: CPT

## 2024-04-03 PROCEDURE — 80048 BASIC METABOLIC PNL TOTAL CA: CPT

## 2024-04-03 PROCEDURE — 6370000000 HC RX 637 (ALT 250 FOR IP): Performed by: NURSE PRACTITIONER

## 2024-04-03 PROCEDURE — 97535 SELF CARE MNGMENT TRAINING: CPT

## 2024-04-03 PROCEDURE — 85025 COMPLETE CBC W/AUTO DIFF WBC: CPT

## 2024-04-03 PROCEDURE — 1280000000 HC REHAB R&B

## 2024-04-03 PROCEDURE — 83735 ASSAY OF MAGNESIUM: CPT

## 2024-04-03 PROCEDURE — 6370000000 HC RX 637 (ALT 250 FOR IP): Performed by: STUDENT IN AN ORGANIZED HEALTH CARE EDUCATION/TRAINING PROGRAM

## 2024-04-03 PROCEDURE — 6370000000 HC RX 637 (ALT 250 FOR IP): Performed by: HOSPITALIST

## 2024-04-03 PROCEDURE — 87635 SARS-COV-2 COVID-19 AMP PRB: CPT

## 2024-04-03 PROCEDURE — 6370000000 HC RX 637 (ALT 250 FOR IP): Performed by: INTERNAL MEDICINE

## 2024-04-03 PROCEDURE — 99233 SBSQ HOSP IP/OBS HIGH 50: CPT | Performed by: PSYCHIATRY & NEUROLOGY

## 2024-04-03 PROCEDURE — 36415 COLL VENOUS BLD VENIPUNCTURE: CPT

## 2024-04-03 PROCEDURE — 97530 THERAPEUTIC ACTIVITIES: CPT

## 2024-04-03 RX ORDER — MECLIZINE HYDROCHLORIDE 25 MG/1
25 TABLET ORAL 3 TIMES DAILY PRN
Status: ON HOLD | DISCHARGE
Start: 2024-04-03 | End: 2024-04-13

## 2024-04-03 RX ORDER — SPIRONOLACTONE 25 MG/1
25 TABLET ORAL DAILY
Status: DISPENSED | OUTPATIENT
Start: 2024-04-04

## 2024-04-03 RX ORDER — LISINOPRIL 5 MG/1
5 TABLET ORAL DAILY
Status: DISPENSED | OUTPATIENT
Start: 2024-04-04

## 2024-04-03 RX ORDER — DEXTROSE MONOHYDRATE 100 MG/ML
INJECTION, SOLUTION INTRAVENOUS CONTINUOUS PRN
Status: CANCELLED | OUTPATIENT
Start: 2024-04-03

## 2024-04-03 RX ORDER — SPIRONOLACTONE 25 MG/1
25 TABLET ORAL DAILY
Qty: 30 TABLET | Refills: 3 | Status: ON HOLD | DISCHARGE
Start: 2024-04-03

## 2024-04-03 RX ORDER — ACETAMINOPHEN 650 MG/1
650 SUPPOSITORY RECTAL EVERY 6 HOURS PRN
Status: CANCELLED | OUTPATIENT
Start: 2024-04-03

## 2024-04-03 RX ORDER — LANOLIN ALCOHOL/MO/W.PET/CERES
3 CREAM (GRAM) TOPICAL NIGHTLY PRN
Status: CANCELLED | OUTPATIENT
Start: 2024-04-03

## 2024-04-03 RX ORDER — ONDANSETRON 4 MG/1
4 TABLET, ORALLY DISINTEGRATING ORAL EVERY 8 HOURS PRN
Status: DISPENSED | OUTPATIENT
Start: 2024-04-03

## 2024-04-03 RX ORDER — DULOXETIN HYDROCHLORIDE 60 MG/1
60 CAPSULE, DELAYED RELEASE ORAL DAILY
Status: CANCELLED | OUTPATIENT
Start: 2024-04-04

## 2024-04-03 RX ORDER — INSULIN LISPRO 100 [IU]/ML
0-4 INJECTION, SOLUTION INTRAVENOUS; SUBCUTANEOUS NIGHTLY
Status: ACTIVE | OUTPATIENT
Start: 2024-04-03

## 2024-04-03 RX ORDER — ATORVASTATIN CALCIUM 10 MG/1
20 TABLET, FILM COATED ORAL DAILY
Status: CANCELLED | OUTPATIENT
Start: 2024-04-04

## 2024-04-03 RX ORDER — DEXTROSE MONOHYDRATE 100 MG/ML
INJECTION, SOLUTION INTRAVENOUS CONTINUOUS PRN
Status: ACTIVE | OUTPATIENT
Start: 2024-04-03

## 2024-04-03 RX ORDER — INSULIN GLARGINE 100 [IU]/ML
10 INJECTION, SOLUTION SUBCUTANEOUS NIGHTLY
Status: CANCELLED | OUTPATIENT
Start: 2024-04-03

## 2024-04-03 RX ORDER — INSULIN LISPRO 100 [IU]/ML
0-8 INJECTION, SOLUTION INTRAVENOUS; SUBCUTANEOUS
Status: DISPENSED | OUTPATIENT
Start: 2024-04-03

## 2024-04-03 RX ORDER — LANOLIN ALCOHOL/MO/W.PET/CERES
400 CREAM (GRAM) TOPICAL DAILY
Status: DISCONTINUED | OUTPATIENT
Start: 2024-04-04 | End: 2024-04-05

## 2024-04-03 RX ORDER — LANOLIN ALCOHOL/MO/W.PET/CERES
400 CREAM (GRAM) TOPICAL DAILY
Qty: 30 TABLET | Status: ON HOLD | DISCHARGE
Start: 2024-04-03

## 2024-04-03 RX ORDER — POLYETHYLENE GLYCOL 3350 17 G/17G
17 POWDER, FOR SOLUTION ORAL DAILY PRN
Status: CANCELLED | OUTPATIENT
Start: 2024-04-03

## 2024-04-03 RX ORDER — MECLIZINE HCL 12.5 MG/1
25 TABLET ORAL 3 TIMES DAILY PRN
Status: CANCELLED | OUTPATIENT
Start: 2024-04-03

## 2024-04-03 RX ORDER — METOPROLOL SUCCINATE 25 MG/1
25 TABLET, EXTENDED RELEASE ORAL DAILY
Status: DISPENSED | OUTPATIENT
Start: 2024-04-04

## 2024-04-03 RX ORDER — BUPROPION HYDROCHLORIDE 150 MG/1
300 TABLET ORAL EVERY MORNING
Status: DISPENSED | OUTPATIENT
Start: 2024-04-04

## 2024-04-03 RX ORDER — SPIRONOLACTONE 25 MG/1
25 TABLET ORAL DAILY
Status: CANCELLED | OUTPATIENT
Start: 2024-04-04

## 2024-04-03 RX ORDER — ASPIRIN 81 MG/1
81 TABLET, CHEWABLE ORAL DAILY
Status: CANCELLED | OUTPATIENT
Start: 2024-04-04

## 2024-04-03 RX ORDER — FUROSEMIDE 20 MG/1
20 TABLET ORAL DAILY
Status: DISPENSED | OUTPATIENT
Start: 2024-04-04

## 2024-04-03 RX ORDER — FUROSEMIDE 20 MG/1
20 TABLET ORAL DAILY
Qty: 60 TABLET | Refills: 3 | Status: ON HOLD | DISCHARGE
Start: 2024-04-03

## 2024-04-03 RX ORDER — INSULIN LISPRO 100 [IU]/ML
0-4 INJECTION, SOLUTION INTRAVENOUS; SUBCUTANEOUS NIGHTLY
Status: CANCELLED | OUTPATIENT
Start: 2024-04-03

## 2024-04-03 RX ORDER — INSULIN GLARGINE 100 [IU]/ML
10 INJECTION, SOLUTION SUBCUTANEOUS NIGHTLY
Status: DISCONTINUED | OUTPATIENT
Start: 2024-04-03 | End: 2024-04-05

## 2024-04-03 RX ORDER — BUPROPION HYDROCHLORIDE 150 MG/1
300 TABLET ORAL EVERY MORNING
Status: CANCELLED | OUTPATIENT
Start: 2024-04-04

## 2024-04-03 RX ORDER — ASPIRIN 81 MG/1
81 TABLET, CHEWABLE ORAL DAILY
Status: DISPENSED | OUTPATIENT
Start: 2024-04-04

## 2024-04-03 RX ORDER — LANOLIN ALCOHOL/MO/W.PET/CERES
400 CREAM (GRAM) TOPICAL DAILY
Status: DISCONTINUED | OUTPATIENT
Start: 2024-04-03 | End: 2024-04-03 | Stop reason: HOSPADM

## 2024-04-03 RX ORDER — ONDANSETRON 4 MG/1
4 TABLET, ORALLY DISINTEGRATING ORAL EVERY 8 HOURS PRN
Status: CANCELLED | OUTPATIENT
Start: 2024-04-03

## 2024-04-03 RX ORDER — ATORVASTATIN CALCIUM 10 MG/1
20 TABLET, FILM COATED ORAL DAILY
Status: DISPENSED | OUTPATIENT
Start: 2024-04-04

## 2024-04-03 RX ORDER — LISINOPRIL 5 MG/1
5 TABLET ORAL DAILY
Status: CANCELLED | OUTPATIENT
Start: 2024-04-04

## 2024-04-03 RX ORDER — INSULIN GLARGINE 100 [IU]/ML
10 INJECTION, SOLUTION SUBCUTANEOUS NIGHTLY
Status: DISCONTINUED | OUTPATIENT
Start: 2024-04-03 | End: 2024-04-03 | Stop reason: HOSPADM

## 2024-04-03 RX ORDER — LANOLIN ALCOHOL/MO/W.PET/CERES
3 CREAM (GRAM) TOPICAL NIGHTLY PRN
Status: DISPENSED | OUTPATIENT
Start: 2024-04-03

## 2024-04-03 RX ORDER — INSULIN LISPRO 100 [IU]/ML
0-8 INJECTION, SOLUTION INTRAVENOUS; SUBCUTANEOUS
Status: ON HOLD | DISCHARGE
Start: 2024-04-03

## 2024-04-03 RX ORDER — ASPIRIN 81 MG/1
81 TABLET, CHEWABLE ORAL DAILY
Qty: 30 TABLET | Refills: 3 | Status: ON HOLD | DISCHARGE
Start: 2024-04-03

## 2024-04-03 RX ORDER — ACETAMINOPHEN 325 MG/1
650 TABLET ORAL EVERY 6 HOURS PRN
Status: CANCELLED | OUTPATIENT
Start: 2024-04-03

## 2024-04-03 RX ORDER — MECLIZINE HCL 12.5 MG/1
25 TABLET ORAL 3 TIMES DAILY PRN
Status: DISPENSED | OUTPATIENT
Start: 2024-04-03

## 2024-04-03 RX ORDER — GLUCAGON 1 MG/ML
1 KIT INJECTION PRN
Status: ACTIVE | OUTPATIENT
Start: 2024-04-03

## 2024-04-03 RX ORDER — INSULIN LISPRO 100 [IU]/ML
0-8 INJECTION, SOLUTION INTRAVENOUS; SUBCUTANEOUS
Status: CANCELLED | OUTPATIENT
Start: 2024-04-03

## 2024-04-03 RX ORDER — INSULIN LISPRO 100 [IU]/ML
0-4 INJECTION, SOLUTION INTRAVENOUS; SUBCUTANEOUS NIGHTLY
Status: ON HOLD | DISCHARGE
Start: 2024-04-03

## 2024-04-03 RX ORDER — CHLORHEXIDINE GLUCONATE ORAL RINSE 1.2 MG/ML
15 SOLUTION DENTAL 2 TIMES DAILY
Qty: 420 ML | Refills: 0 | Status: ON HOLD | DISCHARGE
Start: 2024-04-03 | End: 2024-04-17

## 2024-04-03 RX ORDER — LANOLIN ALCOHOL/MO/W.PET/CERES
400 CREAM (GRAM) TOPICAL DAILY
Status: CANCELLED | OUTPATIENT
Start: 2024-04-04

## 2024-04-03 RX ORDER — ACETAMINOPHEN 650 MG/1
650 SUPPOSITORY RECTAL EVERY 6 HOURS PRN
Status: DISCONTINUED | OUTPATIENT
Start: 2024-04-03 | End: 2024-04-04

## 2024-04-03 RX ORDER — METOPROLOL SUCCINATE 25 MG/1
25 TABLET, EXTENDED RELEASE ORAL DAILY
Status: CANCELLED | OUTPATIENT
Start: 2024-04-04

## 2024-04-03 RX ORDER — LISINOPRIL 5 MG/1
5 TABLET ORAL DAILY
Qty: 30 TABLET | Refills: 3 | Status: ON HOLD | DISCHARGE
Start: 2024-04-03

## 2024-04-03 RX ORDER — POLYETHYLENE GLYCOL 3350 17 G/17G
17 POWDER, FOR SOLUTION ORAL DAILY PRN
Status: ACTIVE | OUTPATIENT
Start: 2024-04-03

## 2024-04-03 RX ORDER — FUROSEMIDE 20 MG/1
20 TABLET ORAL DAILY
Status: CANCELLED | OUTPATIENT
Start: 2024-04-04

## 2024-04-03 RX ORDER — GLUCAGON 1 MG/ML
1 KIT INJECTION PRN
Status: CANCELLED | OUTPATIENT
Start: 2024-04-03

## 2024-04-03 RX ORDER — ACETAMINOPHEN 325 MG/1
650 TABLET ORAL EVERY 6 HOURS PRN
Status: DISPENSED | OUTPATIENT
Start: 2024-04-03

## 2024-04-03 RX ORDER — DULOXETIN HYDROCHLORIDE 60 MG/1
60 CAPSULE, DELAYED RELEASE ORAL DAILY
Status: DISPENSED | OUTPATIENT
Start: 2024-04-04

## 2024-04-03 RX ADMIN — CHLORHEXIDINE GLUCONATE 15 ML: 1.2 RINSE ORAL at 08:47

## 2024-04-03 RX ADMIN — METOPROLOL SUCCINATE 25 MG: 25 TABLET, FILM COATED, EXTENDED RELEASE ORAL at 08:47

## 2024-04-03 RX ADMIN — DULOXETINE HYDROCHLORIDE 60 MG: 60 CAPSULE, DELAYED RELEASE ORAL at 08:47

## 2024-04-03 RX ADMIN — LISINOPRIL 5 MG: 5 TABLET ORAL at 08:47

## 2024-04-03 RX ADMIN — APIXABAN 2.5 MG: 2.5 TABLET, FILM COATED ORAL at 20:46

## 2024-04-03 RX ADMIN — INSULIN LISPRO 4 UNITS: 100 INJECTION, SOLUTION INTRAVENOUS; SUBCUTANEOUS at 08:47

## 2024-04-03 RX ADMIN — BUPROPION HYDROCHLORIDE 300 MG: 150 TABLET, EXTENDED RELEASE ORAL at 08:47

## 2024-04-03 RX ADMIN — INSULIN GLARGINE 10 UNITS: 100 INJECTION, SOLUTION SUBCUTANEOUS at 20:46

## 2024-04-03 RX ADMIN — ATORVASTATIN CALCIUM 20 MG: 10 TABLET, FILM COATED ORAL at 08:47

## 2024-04-03 RX ADMIN — ACETAMINOPHEN 650 MG: 325 TABLET ORAL at 20:49

## 2024-04-03 RX ADMIN — FUROSEMIDE 20 MG: 20 TABLET ORAL at 08:46

## 2024-04-03 RX ADMIN — Medication 400 MG: at 08:47

## 2024-04-03 RX ADMIN — TIOTROPIUM BROMIDE AND OLODATEROL 2 PUFF: 3.124; 2.736 SPRAY, METERED RESPIRATORY (INHALATION) at 08:49

## 2024-04-03 RX ADMIN — ASPIRIN 81 MG: 81 TABLET, CHEWABLE ORAL at 08:47

## 2024-04-03 RX ADMIN — APIXABAN 2.5 MG: 2.5 TABLET, FILM COATED ORAL at 08:47

## 2024-04-03 RX ADMIN — SPIRONOLACTONE 25 MG: 25 TABLET ORAL at 08:47

## 2024-04-03 ASSESSMENT — PAIN SCALES - GENERAL: PAINLEVEL_OUTOF10: 0

## 2024-04-03 NOTE — PROGRESS NOTES
Physician Progress Note      PATIENT:               CATHY LOPEZ  [facility] #:                  [id number]  :                       8/3/1934  ADMIT DATE:       3/30/2024 12:51 PM  DISCH DATE:  RESPONDING  PROVIDER #:        LISA NEUMANN          QUERY TEXT:    Pt admitted with frequent falls. Pt noted to have acute CVA per MRI report on   . If possible, please document in progress notes and discharge summary the   present on admission status of CVA:    The medical record reflects the following:  Risk Factors: advanced age, s/p fall, afib, DM  Clinical Indicators: MRI on : Acute right pontine infarction, Neuro notes,   \"4-5 falls over the last 5 days\" and \"spinning sensation and unsteadiness   prior to the fall\"  Treatment: MRI, CT, Neuro consult, supportive care    Thank you,  Trixie Shi RN, CDS  [email or web address]  Options provided:  -- Yes, acute 4mm CVA of Nitin was present at the time of the order to admit to   the hospital  -- No, acute 4mm CVA of Nitin was not present on admission and developed during   the inpatient stay  -- Other - I will add my own diagnosis  -- Disagree - Not applicable / Not valid  -- Disagree - Clinically unable to determine / Unknown  -- Refer to Clinical Documentation Reviewer    PROVIDER RESPONSE TEXT:    Yes, acute 4mm CVA of Nitin was present at the time of the order to admit to   the hospital.    Query created by: Trixie Shi on 4/3/2024 1:50 PM      Electronically signed by:  LISA NEUMANN 4/3/2024 2:20 PM

## 2024-04-03 NOTE — PROGRESS NOTES
NURSING ASSESSMENT: ARU ADMISSION  [facility]    Patient:Maria E Valdez     Rehab Dx/Hx: Late effects of cerebral ischemic stroke [I69.30]   :8/3/1934  MRN:[id number]  Date of Admit: 4/3/2024  Room #: 0154/0154-01    Subjective:   Patient admitted to room 154 @ 1614 from B3 via wheelchair.   Alert and oriented x4.  Oriented to room and call light system. Oriented to rehab routine and therapy schedules. Informed about care conferences and ordering of meals with PCA.    Drug / Medication Review:   Medications were reviewed by RN at time of admission  [x]  No potential or actual clinically significant medication issues were noted.   []  Potential or actual clinically significant medication issues were found and MD was notified. (Specified below if applicable)   []  Allergy to medication   []  Drug interactions (drug/drug, drug/food, drug/disease interactions)   []  Duplicate drug   []  Omission (drug missing from prescribed regimen)   []  Non adherence   []  Adverse reaction   []  Wrong patient, drug, dose, route, time error   []  Ineffective drug therapy    Patient Mood Interview    Symptom Presence  0. No (enter 0 in column 2)  1. Yes (enter 0-3 in column 2)  9. No response (leave column 2 blank  Symptom Frequency  0. Never or 1 day  1. 2-6 days (several days)  2. 7-11 days (half or more days)  3. 12-14 days (nearly everyday) 1. Symptom Presence 2. Symptom Frequency    Enter scores in boxes   Little interest or pleasure in doing things   0 0   Feeling down, depressed, or hopeless   1 2   If either A or B is coded 2 or 3, CONTINUE asking the questions below.  If not, END the interview.     Trouble falling or staying asleep, or sleeping too much   0 0   Feeling tired or having little energy   1 3   Poor appetite or overeating   0 0   Feeling bad about yourself - or that you are a failure or have let yourself or your family down   1 2   Trouble concentrating on things, such as reading the

## 2024-04-03 NOTE — PLAN OF CARE
CHF Care Plan      Patient's EF (Ejection Fraction) is greater than 40%    Heart Failure Medications:  Diuretics:: None    (One of the following REQUIRED for EF </= 40%/SYSTOLIC FAILURE but MAY be used in EF% >40%/DIASTOLIC FAILURE)        ACE:: Lisinopril        ARB:: None         ARNI:: None    (Beta Blockers)  NON- Evidenced Based Beta Blocker (for EF% >40%/DIASTOLIC FAILURE): None    Evidenced Based Beta Blocker::(REQUIRED for EF% <40%/SYSTOLIC FAILURE) Metoprolol SUCCinate- Toprol XL  ...................................................................................................................................................    Failed to redirect to the Timeline version of the [employer] SmartLink.      Patient's weights and intake/output reviewed    Daily Weight log at bedside, patient/family participation in use of log: \"yes    Patient's current weight today shows a difference of 0 lbs    than last documented weight.      Intake/Output Summary (Last 24 hours) at 4/3/2024 1440  Last data filed at 4/3/2024 1258  Gross per 24 hour   Intake 480 ml   Output 2050 ml   Net -1570 ml       Education Booklet Provided: yes    Comorbidities Reviewed Yes    Patient has a past medical history of Diabetes (HCC) and Osteoarthrosis.     >>For CHF and Comorbidity documentation on Education Time and Topics, please see Education Tab      Pt resting in bed at this time on room air. Pt denies shortness of breath. Pt with pitting lower extremity edema.     Patient and/or Family's stated Goal of Care this Admission: increase activity tolerance, better understand heart failure and disease management, be more comfortable, and reduce lower extremity edema prior to discharge        :

## 2024-04-03 NOTE — PROGRESS NOTES
IRF BILLY Admission Assessment  [facility] Acute Rehab Unit    Patient:Maria E Valdez     Rehab Dx/Hx: Late effects of cerebral ischemic stroke [I69.30]   :8/3/1934  MRN:[id number]  Date of Admit: 4/3/2024     Pain Effect on Sleep:  Over the past 5 days, how much of the time has pain made it hard for you to sleep at night?  []  0. Does not apply - I have not had any pain or hurting in the past 5 days  [x]  1. Rarely or not at all  []  2. Occasionally  []  3. Frequently  []  4. Almost constantly  []  8. Unable to answer    Over the past 5 days, how often have you limited your participation in rehabilitation therapy sessions due to pain?  []  0. Does not apply - I have not received rehabilitation therapy in the past 5 days  [x]  1. Rarely or not at all  []  2. Occasionally  []  3. Frequently  []  4. Almost constantly  []  8. Unable to answer    Pain Interference with Day-to-Day Activities:  Over the past 5 days, how often have you limited your day-to-day activities (excluding rehabilitation therapy session)?  [x]  1. Rarely or not at all  []  2. Occasionally  []  3. Frequently  []  4. Almost constantly  []  8. Unable to answer      High-Risk Drug Classes: Use and Indication   Is taking: Check if the pt is taking any medications by pharmacological classification  Indication noted: If column 1 is checked, check if there is an indication noted for all meds in the drug class Is taking  (check all that apply) Indication noted (check all that apply)   Antipsychotic [] []   Anticoagulant [x] [x]   Antibiotic [] []   Opioid [] []   Antiplatelet [x] [x]   Hypoglycemic (including insulin) [x] [x]   None of the above [] []     Special Treatments, Procedures, and Programs   Check all of the following treatments, procedures, and programs that apply on admission.  On admission (check all that apply)   Cancer Treatments    A1. Chemotherapy []   A2. IV []   A3. Oral []   A10. Other []   B1. Radiation []   Respiratory Therapies

## 2024-04-03 NOTE — PROGRESS NOTES
Neurology Progress Note    ID: Maria E Valdez is a 89 y.o. female    : 8/3/1934     LOS: 1 day     ASSESSMENT    1.  Frequent falls.  2.  Tremor.  3.  Chronic diabetes.  4.  S/p left total knee replacement.  5.  Chronic anticoagulation use with atrial fibrillation.  6.  Acute right pontine infarction.     The patient has evidence of tremor and mild degree of parkinsonism.  The CTA of head and neck showed no significant hemodynamic stenosis.  The CT brain also showed no evidence of acute infarction.  MRI brain showed acute right pontine infarction.     From neurology perspective, the etiology of the stroke is likely small vessel disease.    24: The patient is neurologically stabilized.  The patient denies worsening of weakness on her legs.  Blood pressure has been stable.    Plan:     1.  Continue Eliquis and statin.  2.  Recommend baby aspirin if there is no contraindication.  3.  PT/OT/ST.  4.  The target blood pressure below 140/90.  5.  Avoid hypotension.  6.  Continue cardiac telemetry.    The patient is a good candidate for ARU.  Neurology will sign off.    Medications:  Scheduled Meds:    magnesium oxide  400 mg Oral Daily    insulin glargine  10 Units SubCUTAneous Nightly    aspirin  81 mg Oral Daily    chlorhexidine  15 mL Mouth/Throat BID    spironolactone  25 mg Oral Daily    furosemide  20 mg Oral Daily    lisinopril  5 mg Oral Daily    sodium chloride flush  5-40 mL IntraVENous 2 times per day    apixaban  2.5 mg Oral BID    atorvastatin  20 mg Oral Daily    buPROPion  300 mg Oral QAM    DULoxetine  60 mg Oral Daily    tiotropium-olodaterol  2 puff Inhalation Daily    metoprolol succinate  25 mg Oral Daily    insulin lispro  0-8 Units SubCUTAneous TID     insulin lispro  0-4 Units SubCUTAneous Nightly     Continuous Infusions:    sodium chloride 25 mL (24 0955)    dextrose       PRN Meds: meclizine, sodium chloride flush, sodium chloride, potassium chloride **OR** potassium alternative

## 2024-04-03 NOTE — DISCHARGE SUMMARY
Hospital Medicine Discharge Summary    Patient: Maria E Valdez   : 8/3/1934     Admit Date: 3/30/2024   Discharge Date:   4/3/2024  Disposition:  []Home   []HHC  []SNF  []ECF  [x]Acute Rehab  []LTAC  []Hospice  Code status:  [x]Full  []DNR/CCA  []Limited (DNR/CCA with Do Not Intubate)  []DNRCC  Condition at Discharge: Stable  Primary Care Provider: Moises Winslow MD    Admitting Provider: Philip Nye MD  Discharge Provider: Stella Ybarra, APRN - CNP     Discharge Diagnoses:      Active Hospital Problems    Diagnosis     Head injury [S09.90XA]     Anticoagulated by anticoagulation treatment [Z79.01]     Fall [W19.XXXA]     Cerebrovascular accident (CVA) (HCC) [I63.9]     Stroke-like symptoms [R29.90]     Acute on chronic combined systolic and diastolic CHF (congestive heart failure) (HCC) [I50.43]     Permanent atrial fibrillation (HCC) [I48.21]     Hypercholesterolemia [E78.00]     Dizziness [R42]     Elevated troponin [R79.89]     Frequent falls [R29.6]     Hypertension [I10]     Controlled type 2 diabetes mellitus with hyperglycemia, with long-term current use of insulin (HCC) [E11.65, Z79.4]     Diabetic polyneuropathy (HCC) [E11.42]     Type 2 diabetes mellitus with diabetic polyneuropathy (HCC) [E11.42]     Osteoarthritis [M19.90]     Chronic obstructive pulmonary disease (HCC) [J44.9]        Presenting Admission History:        Maria E Valdez is a 89 y.o. 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  She states she was eating breakfast today and after that she stood up and felt everything is moving and then she fell and hit back of her head.  She states in the last few days she fell 3 times.  Feels everything moves before she falls.  Denies any loss of consciousness.   Denies any chest pain, shortness of breath, nausea/vomiting or any other complaints.  In ED she was found to have  daily  Qty: 30 tablet      meclizine (ANTIVERT) 25 MG tablet Take 1 tablet by mouth 3 times daily as needed for Dizziness      furosemide (LASIX) 20 MG tablet Take 1 tablet by mouth daily  Qty: 60 tablet, Refills: 3      !! insulin lispro (HUMALOG) 100 UNIT/ML SOLN injection vial Inject 0-8 Units into the skin 3 times daily (with meals)      !! insulin lispro (HUMALOG) 100 UNIT/ML SOLN injection vial Inject 0-4 Units into the skin nightly       !! - Potential duplicate medications found. Please discuss with provider.        Current Discharge Medication List        CONTINUE these medications which have CHANGED    Details   lisinopril (PRINIVIL;ZESTRIL) 5 MG tablet Take 1 tablet by mouth daily  Qty: 30 tablet, Refills: 3           Current Discharge Medication List        CONTINUE these medications which have NOT CHANGED    Details   acetaminophen (TYLENOL) 500 MG tablet Take 2 tablets by mouth every 6 hours as needed for Pain      alendronate (FOSAMAX) 70 MG tablet Take 1 tablet by mouth every 7 days      glycopyrrolate-formoterol (BEVESPI AEROSPHERE) 9-4.8 MCG/ACT AERO Inhale 2 puffs into the lungs 2 times daily      buPROPion (WELLBUTRIN XL) 300 MG extended release tablet Take 1 tablet by mouth every morning      apixaban (ELIQUIS) 2.5 MG TABS tablet Take 1 tablet by mouth 2 times daily      insulin glargine (LANTUS) 100 UNIT/ML injection vial Inject 10 Units into the skin nightly      melatonin 3 MG TABS tablet Take 1 tablet by mouth daily      metoprolol succinate (TOPROL XL) 25 MG extended release tablet Take 1 tablet by mouth daily      polyethylene glycol (GLYCOLAX) 17 GM/SCOOP powder Take 17 g by mouth daily      albuterol sulfate HFA (VENTOLIN HFA) 108 (90 Base) MCG/ACT inhaler Inhale 2 puffs into the lungs every 6 hours as needed for Wheezing      calcium carbonate 600 MG TABS tablet Take 1 tablet by mouth every 3 hours as needed      dicyclomine (BENTYL) 10 MG capsule Take 1 capsule by mouth 4 times daily

## 2024-04-03 NOTE — PROGRESS NOTES
Patient given Rx for glasses. Patient: Maria E Valdez  [id number]  Date: 4/3/2024      Chief Complaint: impaired balance, falls    History of Present Illness/Hospital Course:  Maria E Valdez is an 89 year old female with a past medical history significant for permanent atrial fibrillation on AC, COPD, HFrEF, DM, HTN, and OA who [facility] with dizziness and falls. Cardiology and Neurology were consulted. MRI brain revealed acute dorsal right jm CVA. Urology is consulted due to urinary retention and pollack was placed. She continues to have functional deficits below his baseline. She reports living in an assisted living facility and states that her granddaughter helps her make decisions. She is motivated to work with therapies and is interested in ARU.     Interval History:  No acute events overnight. Today Jenn is seen without family present. She reports feeling tired. She remains interested in ARU.     has a past medical history of Diabetes (HCC) and Osteoarthrosis.     has a past surgical history that includes knee surgery.     reports that she has never smoked. She has never used smokeless tobacco. She reports that she does not drink alcohol and does not use drugs.    family history includes Cancer in her mother; Diabetes in her sister.      REVIEW OF SYSTEMS:   Denies f/c, n/v, cp, sob    Physical Examination:  Vitals: Patient Vitals for the past 24 hrs:   BP Temp Temp src Pulse Resp SpO2 Weight   04/03/24 1118 (!) 143/73 97.5 °F (36.4 °C) Oral 80 14 97 % --   04/03/24 0852 -- -- -- 78 16 95 % --   04/03/24 0848 137/77 -- -- 87 -- 98 % --   04/03/24 0815 135/65 97.3 °F (36.3 °C) Oral 80 16 94 % --   04/03/24 0510 -- -- -- -- -- -- 84.6 kg (186 lb 8 oz)   04/03/24 0445 (!) 140/75 97.8 °F (36.6 °C) Oral 82 -- 96 % --   04/02/24 2340 (!) 114/55 97.3 °F (36.3 °C) Oral 73 16 95 % --   04/02/24 2100 105/74 97.6 °F (36.4 °C) Oral 85 18 95 % --   04/02/24 1700 134/70 97.6 °F (36.4 °C) Oral 89 18 94 % --     Mood:  Stable  Const: No distress  ENT: Oral mucosa moist  Eyes: No discharge or injection  CV: extremities well perfused  Resp: no respiratory distress, on room air  GI: Soft, nontender, nondistended.   Neuro: Alert, oriented, appropriate. No cranial nerve deficits appreciated.    Skin: No lesions or rashes noted.   MSK: No joint abnormalities noted.       Lab Results   Component Value Date    WBC 7.0 04/03/2024    HGB 12.5 04/03/2024    HCT 37.8 04/03/2024    MCV 91.9 04/03/2024     04/03/2024     Lab Results   Component Value Date    INR 1.24 (H) 03/30/2024    PROTIME 15.6 (H) 03/30/2024     Lab Results   Component Value Date    CREATININE 1.0 04/03/2024    BUN 20 04/03/2024     04/03/2024    K 4.1 04/03/2024    CL 99 04/03/2024    CO2 26 04/03/2024     Lab Results   Component Value Date    ALT 23 03/30/2024    AST 22 03/30/2024    ALKPHOS 60 03/30/2024    BILITOT 0.8 03/30/2024       Most recent echocardiogram revealed EF 40-45%, grade 3 diastolic dysfunction.    Most recent EKG revealed atrial fibrillation.     IMAGING    MRI Brain WO contrast 4/1/24  1.  Acute 4 mm infarct in the dorsal right jm.  2.  Mild to moderate chronic small vessel ischemic disease.  3.  Old infarcts in the left temporal lobe and right cerebellar hemisphere.    CTA Head and Neck W contast 3/30/24  Mild atherosclerosis, otherwise unremarkable CT angiogram of the head and  neck.    XR Chest 3/30/24  1. No acute cardiopulmonary process identified.  2. Moderate cardiomegaly.    CT C spine 3/30/24  No acute abnormality of the cervical spine.     CT Head WO contrast 3/30/24  No acute intracranial abnormality.     Assessment:  1. Acute dorsal right jm CVA   2. Frequent falls   3. Urinary retention   4. DM2  5. Atrial fibrillation  6. COPD  7. HFrEF     Recommendations:  Plan to admit to ARU today.     Thank you for this consult. Please contact me with any questions or concerns.   Charlotte Serrano MD 4/3/2024, 12:47 PM

## 2024-04-03 NOTE — PROGRESS NOTES
Patient discharged to ARU. IV removed with no complications, telemetry removed CMU notified. Discharge hand-off given to ARU nurse. All belongings sent home with patient. Patient transported via wheelchair.

## 2024-04-03 NOTE — PLAN OF CARE
CHF Care Plan      Patient's EF (Ejection Fraction) is greater than 40%    Heart Failure Medications:  Diuretics:: None    (One of the following REQUIRED for EF </= 40%/SYSTOLIC FAILURE but MAY be used in EF% >40%/DIASTOLIC FAILURE)        ACE:: Lisinopril        ARB:: None         ARNI:: None    (Beta Blockers)  NON- Evidenced Based Beta Blocker (for EF% >40%/DIASTOLIC FAILURE): None    Evidenced Based Beta Blocker::(REQUIRED for EF% <40%/SYSTOLIC FAILURE) Metoprolol SUCCinate- Toprol XL  ...................................................................................................................................................    Failed to redirect to the Timeline version of the [employer] SmartLink.      Patient's weights and intake/output reviewed    Daily Weight log at bedside, patient/family participation in use of log: \"yes    Patient's current weight today shows a difference of 0 lbs     than last documented weight.      Intake/Output Summary (Last 24 hours) at 4/3/2024 0608  Last data filed at 4/2/2024 2340  Gross per 24 hour   Intake 720 ml   Output 975 ml   Net -255 ml       Education Booklet Provided: yes    Comorbidities Reviewed Yes    Patient has a past medical history of Diabetes (HCC) and Osteoarthrosis.     >>For CHF and Comorbidity documentation on Education Time and Topics, please see Education Tab      Pt resting in bed at this time on room air. Pt with complaints of shortness of breath. Pt with pitting lower extremity edema.     Patient and/or Family's stated Goal of Care this Admission: reduce shortness of breath, increase activity tolerance, better understand heart failure and disease management, be more comfortable, and reduce lower extremity edema prior to discharge        :

## 2024-04-03 NOTE — DISCHARGE INSTR - COC
Continuity of Care Form    Patient Name: Maria E Valdez   :  8/3/1934  MRN:  [id number]    Admit date:  3/30/2024  Discharge date:  4/3/24    Code Status Order: Full Code   Advance Directives:     Admitting Physician:  Philip Nye MD  PCP: Moises Winslow MD    Discharging Nurse: Bianka Petty Hospital Unit/Room#: 0355/0355-02  Discharging Unit Phone Number: [phone]    Emergency Contact:   Extended Emergency Contact Information  Primary Emergency Contact: Miko Valdez  [address]  [phone]  Relation: Child  Secondary Emergency Contact: GERALDO TO  Mobile Phone: [phone]  Relation: Grandchild   needed? No    Past Surgical History:  Past Surgical History:   Procedure Laterality Date    KNEE SURGERY         Immunization History:   Immunization History   Administered Date(s) Administered    COVID-19, MODERNA, ( formula), (age 12y+), IM, 50mcg/0.5mL 10/09/2023    COVID-19, PFIZER Bivalent, DO NOT Dilute, (age 12y+), IM, 30 mcg/0.3 mL 2022    COVID-19, PFIZER GRAY top, DO NOT Dilute, (age 12 y+), IM, 30 mcg/0.3 mL 2022    COVID-19, PFIZER PURPLE top, DILUTE for use, (age 12 y+), 30mcg/0.3mL 2021, 03/10/2021, 2021       Active Problems:  Patient Active Problem List   Diagnosis Code    Dermatophytosis of nail B35.1    Diabetic polyneuropathy ([facility]) E11.42    Type 2 diabetes mellitus with diabetic polyneuropathy ([facility]) E11.42    Chronic airway obstruction ([facility]) J44.9    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

## 2024-04-03 NOTE — PLAN OF CARE
Problem: Discharge Planning  Goal: Discharge to home or other facility with appropriate resources  Outcome: Completed     Problem: Safety - Adult  Goal: Free from fall injury  Outcome: Completed     Problem: ABCDS Injury Assessment  Goal: Absence of physical injury  Outcome: Completed     Problem: Cardiovascular - Adult  Goal: Maintains optimal cardiac output and hemodynamic stability  Outcome: Completed  Goal: Absence of cardiac dysrhythmias or at baseline  Outcome: Completed     Problem: Skin/Tissue Integrity - Adult  Goal: Skin integrity remains intact  Outcome: Completed     Problem: Musculoskeletal - Adult  Goal: Return mobility to safest level of function  Outcome: Completed  Goal: Return ADL status to a safe level of function  Outcome: Completed     Problem: Metabolic/Fluid and Electrolytes - Adult  Goal: Electrolytes maintained within normal limits  Outcome: Completed  Goal: Glucose maintained within prescribed range  Outcome: Completed     Problem: Respiratory - Adult  Goal: Achieves optimal ventilation and oxygenation  Outcome: Completed     Problem: Genitourinary - Adult  Goal: Absence of urinary retention  Outcome: Completed     Problem: Chronic Conditions and Co-morbidities  Goal: Patient's chronic conditions and co-morbidity symptoms are monitored and maintained or improved  Outcome: Completed

## 2024-04-03 NOTE — PROGRESS NOTES
Occupational Therapy  Facility/Department: [facility] B3 - MED SURG  Daily Treatment Note  NAME: Maria E Valdez  : 8/3/1934  MRN: [id number]  Date of Service: 4/3/2024    Discharge Recommendations:  Subacute/Skilled Nursing Facility, IP Rehab  OT Equipment Recommendations  Equipment Needed: No  Other: Defer to next level of care    AM-PAC score  AM-PAC Inpatient Daily Activity Raw Score: 16 (24)  AM-PAC Inpatient ADL T-Scale Score : 35.96 (24)  ADL Inpatient CMS 0-100% Score: 53.32 (24)  ADL Inpatient CMS G-Code Modifier : CK (24)    Therapy discharge recommendations take into account each patient's current medical complexities and are subject to input/oversight from the patient's healthcare team.   Barriers to Home Discharge:   [] Steps to access home entry or bed/bath:   [x] Unable to transfer, ambulate, or propel wheelchair household distances without assist   [x] Limited available assist at home upon discharge    [] Patient or family requests d/c to post-acute facility    [x] Poor cognition/safety awareness for d/c to home alone    []Unable to maintain ordered weight bearing status    [] Patient with salient signs of long-standing immobility   [x] Patient is at risk for falls   [x] Other: Decreased safety and independence w/ ADLs.     If pt is unable to be seen after this session, please let this note serve as discharge summary.  Please see case management note for discharge disposition.  Thank you.    Patient Diagnosis(es): The primary encounter diagnosis was Injury of head, initial encounter. Diagnoses of Fall, initial encounter, Anticoagulated by anticoagulation treatment, Dizziness, and Elevated troponin were also pertinent to this visit.     Assessment    Assessment: Pt received for OT session resting in bedside chair to address current functional deficits that inhibit independence and safety with ADLs and functional mobility. Pt agreeable to session, reporting  Position: Sitting;Up in chair  MAP (Calculated): 97  SpO2: 98 %  O2 Device: None (Room air)  Bed Mobility Training  Bed Mobility Training: No  Transfer Training  Transfer Training: Yes  Sit to Stand: Contact-guard assistance;Adaptive equipment (to RW)  Stand to Sit: Contact-guard assistance;Adaptive equipment (from RW)  Toilet Transfer: Minimum assistance;Assist X1;Adaptive equipment;Additional time (to BSC; Betty for cues and RW management)     ADL  Feeding: Setup  Feeding Skilled Clinical Factors: setup for pill management  Grooming: Stand by assistance  Grooming Skilled Clinical Factors: standing at sink to brush teeth and wash face.  UE Dressing: Minimal assistance  UE Dressing Skilled Clinical Factors: gown exchange  LE Dressing: Contact guard assistance  LE Dressing Skilled Clinical Factors: CGA in stance to pull brief down/up over hips.  Toileting: Minimal assistance  Toileting Skilled Clinical Factors: seated on BSC; Betty for hygiene in stance this date.  Functional Mobility: Contact guard assistance  Functional Mobility Skilled Clinical Factors: w/ RW to/from bathroom and BSC  Skin Care: Bath wipes;Soap and water      Safety Devices  Type of Devices: All fall risk precautions in place;Call light within reach;Chair alarm in place;Gait belt;Patient at risk for falls;Left in chair;Nurse notified  Restraints  Restraints Initially in Place: No     Patient Education  Education Given To: Patient  Education Provided: Role of Therapy;Plan of Care;ADL Adaptive Strategies;Transfer Training;Energy Conservation  Education Provided Comments: disease specific: role of OT, CHF packet  Education Method: Verbal;Printed Information/Hand-outs  Barriers to Learning: Hearing;Cognition  Education Outcome: Verbalized understanding;Continued education needed    Goals all non-met goals ongoing as of 4/03/2024  Short Term Goals  Time Frame for Short Term Goals: Short term goals to be met by 4/08 unless otherwise specified  Short Term

## 2024-04-03 NOTE — PLAN OF CARE
least restrictive diet with no clinical s/s of aspiration or worsening respiratory/pulmonary status             Short-term Goals  Timeframe for Short-term Goals: 8 Days (04/10/24)  Goal 1: The patient will tolerate recommended diet with no clinical s/s of aspiration 5  Goal 2: The patient/caregiver will demonstrate understanding of compensatory swallow strategies, for improved swallow safety     Cognitive-linguistic Goals:  Long Term Goals:   Time Frame for Long Term Goals: 10 Days (04/12/24)  Goal 1: Pt will improve cognitive-linguistic abilities to promote safe and independent return home PLOF             Short Term Goals  Time Frame for Short Term Goals: 8 Days (04/10/24)  Goal 1: Pt will verbalize orientation concepts x4 using visual aids across 3 sessions  Goal 2: Pt will complete graded recall tasks using compensatory strategies with 70% acc given mod cues  Goal 3: Pt will complete executive function tasks (e.g. meds, time, money, etc) with 70% acc given mod cues  Goal 4: Pt will complete problem solving and thought organization tasks with 70% acc given mod cues  Goal 5: Pt will complete verbal and visual reasoning tasks with 70% acc given mod cues              Time Frame for Short Term Goals: 8 Days (04/10/24)  These goals were reviewed with this patient at the time of assessment and Maria E Valdez is in agreement    Plan of Care: Pt to be seen 5 out of 7 days per week, 60   mins (exact) per day for 10 days (exact)             Dysphagia:  Diet tolerance monitoring, safe swallow strategies, therapeutic PO trials with SLP           Speech/Language/Cognition: Compensatory strategy training and carryover, recall/STM, problem solving, reasoning, exec function, thought organization, attention.          CASE MANAGEMENT:  Goals:   Assist patient/family with discharge planning, patient/family counseling,   and coordination with insurance during ARU stay.    QIM / IRF BILLY SCORES:  ITEM CURRENT SCORE GOAL   Eating CARE  Coordinator: Trixie Morillo, PT, DPT    Other:

## 2024-04-03 NOTE — CARE COORDINATION
CASE MANAGEMENT DISCHARGE SUMMARY      Discharge to: ARU    IMM given: 4/2/2024    Transportation:    Family/car: wheelchair    Confirmed discharge plan with:     Patient: yes     Family:  yes, granddaughter aware     Facility/Agency, name: [id number]      WINNIE, name: Jazmín Peñaloza RN

## 2024-04-03 NOTE — PROGRESS NOTES
[facility]   Daily Progress Note    Admit Date:  3/30/2024  HPI:    Chief Complaint   Patient presents with    Fall     Patient comes from [place], got dizzy and fell from standing height. Hit head on woodwork in the kitchen on the wall. No LOC, on blood thinners. Contusion to back of head.      Maria E Valdez presented with fall.      Cardiology consulted for elevated troponin, dizziness with falls.     history of falls (multiple), permanent atrial fibrillation, hypertension, type 2 diabetes, polyneuropathy, osteoarthritis, COPD    Subjective:  Ms. Valdez lying flat in bed.  Denies any pain or shortness of breath.     Objective:   Patient Vitals for the past 24 hrs:   BP Temp Temp src Pulse Resp SpO2 Weight   04/03/24 0852 -- -- -- 78 16 95 % --   04/03/24 0848 137/77 -- -- 87 -- 98 % --   04/03/24 0815 135/65 97.3 °F (36.3 °C) Oral 80 16 94 % --   04/03/24 0510 -- -- -- -- -- -- 84.6 kg (186 lb 8 oz)   04/03/24 0445 (!) 140/75 97.8 °F (36.6 °C) Oral 82 -- 96 % --   04/02/24 2340 (!) 114/55 97.3 °F (36.3 °C) Oral 73 16 95 % --   04/02/24 2100 105/74 97.6 °F (36.4 °C) Oral 85 18 95 % --   04/02/24 1700 134/70 97.6 °F (36.4 °C) Oral 89 18 94 % --   04/02/24 1145 129/73 97.5 °F (36.4 °C) Oral 97 18 -- --       Intake/Output Summary (Last 24 hours) at 4/3/2024 1130  Last data filed at 4/3/2024 0957  Gross per 24 hour   Intake 480 ml   Output 1700 ml   Net -1220 ml     Wt Readings from Last 3 Encounters:   04/03/24 84.6 kg (186 lb 8 oz)   05/18/19 88.5 kg (195 lb)   08/31/17 102.1 kg (225 lb)         ASSESSMENT:   CHF, acute on chronic combined: back on Lasix 20 mgpo daily  Cardiomyopathy:  EF 40-45%, on low dose lisinopril, metoprolol and spironolactone  Afib, permanent: rate controlled, on Toprol and Eliquis for CVA risk reduction  CVA: acute, felt 2/2 small vessel disease  HYPERTENSION: stable  Aortic stenosis, mild  Frequent falls      PLAN:  Continue current doses of Lasix, lisinopril, Toprol and

## 2024-04-04 LAB
ANION GAP SERPL CALCULATED.3IONS-SCNC: 10 MMOL/L (ref 3–16)
BASOPHILS # BLD: 0.1 K/UL (ref 0–0.2)
BASOPHILS NFR BLD: 0.8 %
BUN SERPL-MCNC: 20 MG/DL (ref 7–20)
CALCIUM SERPL-MCNC: 9.4 MG/DL (ref 8.3–10.6)
CHLORIDE SERPL-SCNC: 98 MMOL/L (ref 99–110)
CO2 SERPL-SCNC: 27 MMOL/L (ref 21–32)
CREAT SERPL-MCNC: 0.9 MG/DL (ref 0.6–1.2)
DEPRECATED RDW RBC AUTO: 17 % (ref 12.4–15.4)
EOSINOPHIL # BLD: 0.3 K/UL (ref 0–0.6)
EOSINOPHIL NFR BLD: 4.2 %
GFR SERPLBLD CREATININE-BSD FMLA CKD-EPI: 61 ML/MIN/{1.73_M2}
GLUCOSE BLD-MCNC: 170 MG/DL (ref 70–99)
GLUCOSE BLD-MCNC: 190 MG/DL (ref 70–99)
GLUCOSE BLD-MCNC: 207 MG/DL (ref 70–99)
GLUCOSE BLD-MCNC: 238 MG/DL (ref 70–99)
GLUCOSE SERPL-MCNC: 180 MG/DL (ref 70–99)
HCT VFR BLD AUTO: 38.3 % (ref 36–48)
HGB BLD-MCNC: 12.6 G/DL (ref 12–16)
LYMPHOCYTES # BLD: 1.1 K/UL (ref 1–5.1)
LYMPHOCYTES NFR BLD: 15.3 %
MCH RBC QN AUTO: 30.5 PG (ref 26–34)
MCHC RBC AUTO-ENTMCNC: 32.9 G/DL (ref 31–36)
MCV RBC AUTO: 92.9 FL (ref 80–100)
MONOCYTES # BLD: 0.7 K/UL (ref 0–1.3)
MONOCYTES NFR BLD: 9.3 %
NEUTROPHILS # BLD: 5 K/UL (ref 1.7–7.7)
NEUTROPHILS NFR BLD: 70.4 %
PERFORMED ON: ABNORMAL
PLATELET # BLD AUTO: 221 K/UL (ref 135–450)
PMV BLD AUTO: 7.8 FL (ref 5–10.5)
POTASSIUM SERPL-SCNC: 4 MMOL/L (ref 3.5–5.1)
RBC # BLD AUTO: 4.12 M/UL (ref 4–5.2)
SODIUM SERPL-SCNC: 135 MMOL/L (ref 136–145)
WBC # BLD AUTO: 7.1 K/UL (ref 4–11)

## 2024-04-04 PROCEDURE — 6370000000 HC RX 637 (ALT 250 FOR IP): Performed by: STUDENT IN AN ORGANIZED HEALTH CARE EDUCATION/TRAINING PROGRAM

## 2024-04-04 PROCEDURE — 80048 BASIC METABOLIC PNL TOTAL CA: CPT

## 2024-04-04 PROCEDURE — 97162 PT EVAL MOD COMPLEX 30 MIN: CPT

## 2024-04-04 PROCEDURE — 36415 COLL VENOUS BLD VENIPUNCTURE: CPT

## 2024-04-04 PROCEDURE — 85025 COMPLETE CBC W/AUTO DIFF WBC: CPT

## 2024-04-04 PROCEDURE — 97530 THERAPEUTIC ACTIVITIES: CPT

## 2024-04-04 PROCEDURE — 97166 OT EVAL MOD COMPLEX 45 MIN: CPT

## 2024-04-04 PROCEDURE — 97116 GAIT TRAINING THERAPY: CPT

## 2024-04-04 PROCEDURE — 1280000000 HC REHAB R&B

## 2024-04-04 PROCEDURE — 97535 SELF CARE MNGMENT TRAINING: CPT

## 2024-04-04 PROCEDURE — 96125 COGNITIVE TEST BY HC PRO: CPT

## 2024-04-04 PROCEDURE — 94640 AIRWAY INHALATION TREATMENT: CPT

## 2024-04-04 PROCEDURE — 92610 EVALUATE SWALLOWING FUNCTION: CPT

## 2024-04-04 RX ORDER — BISACODYL 10 MG
10 SUPPOSITORY, RECTAL RECTAL DAILY PRN
Status: ACTIVE | OUTPATIENT
Start: 2024-04-04

## 2024-04-04 RX ADMIN — ATORVASTATIN CALCIUM 20 MG: 10 TABLET, FILM COATED ORAL at 09:36

## 2024-04-04 RX ADMIN — APIXABAN 2.5 MG: 2.5 TABLET, FILM COATED ORAL at 09:37

## 2024-04-04 RX ADMIN — Medication 400 MG: at 09:37

## 2024-04-04 RX ADMIN — INSULIN LISPRO 2 UNITS: 100 INJECTION, SOLUTION INTRAVENOUS; SUBCUTANEOUS at 11:27

## 2024-04-04 RX ADMIN — BUPROPION HYDROCHLORIDE 300 MG: 150 TABLET, EXTENDED RELEASE ORAL at 09:37

## 2024-04-04 RX ADMIN — INSULIN GLARGINE 10 UNITS: 100 INJECTION, SOLUTION SUBCUTANEOUS at 20:25

## 2024-04-04 RX ADMIN — SPIRONOLACTONE 25 MG: 25 TABLET ORAL at 09:36

## 2024-04-04 RX ADMIN — INSULIN LISPRO 2 UNITS: 100 INJECTION, SOLUTION INTRAVENOUS; SUBCUTANEOUS at 16:35

## 2024-04-04 RX ADMIN — LISINOPRIL 5 MG: 5 TABLET ORAL at 09:37

## 2024-04-04 RX ADMIN — MECLIZINE 25 MG: 12.5 TABLET ORAL at 09:49

## 2024-04-04 RX ADMIN — FUROSEMIDE 20 MG: 20 TABLET ORAL at 09:37

## 2024-04-04 RX ADMIN — TIOTROPIUM BROMIDE AND OLODATEROL 2 PUFF: 3.124; 2.736 SPRAY, METERED RESPIRATORY (INHALATION) at 07:13

## 2024-04-04 RX ADMIN — DULOXETINE HYDROCHLORIDE 60 MG: 60 CAPSULE, DELAYED RELEASE ORAL at 09:37

## 2024-04-04 RX ADMIN — ACETAMINOPHEN 650 MG: 325 TABLET ORAL at 20:25

## 2024-04-04 RX ADMIN — METOPROLOL SUCCINATE 25 MG: 25 TABLET, FILM COATED, EXTENDED RELEASE ORAL at 09:37

## 2024-04-04 RX ADMIN — APIXABAN 2.5 MG: 2.5 TABLET, FILM COATED ORAL at 20:25

## 2024-04-04 RX ADMIN — ASPIRIN 81 MG 81 MG: 81 TABLET ORAL at 09:37

## 2024-04-04 ASSESSMENT — PAIN DESCRIPTION - DESCRIPTORS: DESCRIPTORS: ACHING

## 2024-04-04 ASSESSMENT — PAIN SCALES - GENERAL: PAINLEVEL_OUTOF10: 5

## 2024-04-04 ASSESSMENT — PAIN DESCRIPTION - ORIENTATION: ORIENTATION: OTHER (COMMENT)

## 2024-04-04 ASSESSMENT — PAIN DESCRIPTION - LOCATION: LOCATION: GENERALIZED

## 2024-04-04 ASSESSMENT — PAIN DESCRIPTION - ONSET: ONSET: GRADUAL

## 2024-04-04 NOTE — PLAN OF CARE
Problem: Discharge Planning  Goal: Discharge to home or other facility with appropriate resources  Outcome: Progressing  Flowsheets (Taken 4/4/2024 0613)  Discharge to home or other facility with appropriate resources:   Identify barriers to discharge with patient and caregiver   Arrange for needed discharge resources and transportation as appropriate     Problem: Safety - Adult  Goal: Free from fall injury  Outcome: Progressing     Problem: Skin/Tissue Integrity  Goal: Absence of new skin breakdown  Description: 1.  Monitor for areas of redness and/or skin breakdown  2.  Assess vascular access sites hourly  3.  Every 4-6 hours minimum:  Change oxygen saturation probe site  4.  Every 4-6 hours:  If on nasal continuous positive airway pressure, respiratory therapy assess nares and determine need for appliance change or resting period.  Outcome: Progressing     Problem: ABCDS Injury Assessment  Goal: Absence of physical injury  Outcome: Progressing

## 2024-04-04 NOTE — PROGRESS NOTES
Speech Language Pathology  Facility/Department: [facility]  Initial Speech/Language/Cognitive Assessment       NAME:Maria E Valdez  : 8/3/1934 (89 y.o.)   MRN: [id number]  ROOM: [id number]/[id number]  ADMISSION DATE: 4/3/2024  PATIENT DIAGNOSIS(ES): Late effects of cerebral ischemic stroke [I69.30]  Patient Active Problem List    Diagnosis Date Noted    Head injury 2024    Anticoagulated by anticoagulation treatment 2024    Fall 2024    Cerebrovascular accident (CVA) ([facility]) 2024    Late effects of cerebral ischemic stroke 2024    Stroke-like symptoms 2024    Acute on chronic combined systolic and diastolic CHF (congestive heart failure) (Former[facility]) 2024    Permanent atrial fibrillation ([facility]) 2024    Hypercholesterolemia 2024    Dizziness 2024    Elevated troponin 2024    Frequent falls 2024    Acute idiopathic gout of left foot 2016    Anxiety 2016    Restless legs syndrome 11/10/2015    Strain of tibialis anterior muscle 2015    Lumbar strain 2015    Hypertension 2015    Elevated lipids 2015    Controlled type 2 diabetes mellitus with hyperglycemia, with long-term current use of insulin ([facility]) 2015    Metatarsalgia 10/03/2014    Dermatophytosis of nail 10/22/2013    Diabetic polyneuropathy ([facility]) 10/22/2013    Type 2 diabetes mellitus with diabetic polyneuropathy ([facility]) 10/22/2013    Chronic airway obstruction ([facility]) 2013    Osteoarthritis 2013    Chronic obstructive pulmonary disease ([facility]) 2013    Nontoxic multinodular goiter 2008    Multinodular goiter 2008     Past Medical History:   Diagnosis Date    Diabetes ([facility])     Osteoarthrosis 2013     Past Surgical History:   Procedure Laterality Date    KNEE SURGERY       No Known Allergies    Date of Evaluation: 2024   Evaluating Therapist: SEAMUS Mann    Subjective:   Chart Reviewed: : [x] Yes [] No    Onset Date: pt admitted

## 2024-04-04 NOTE — H&P
touch. Motor examination reveals normal strength in all four limbs diffusely.   Skin: Normal temperature and turgor  MSK: No joint abnormalities noted.     Ext: No significant edema appreciated. No varicosities.    Lab Results   Component Value Date    WBC 7.1 04/04/2024    HGB 12.6 04/04/2024    HCT 38.3 04/04/2024    MCV 92.9 04/04/2024     04/04/2024     Lab Results   Component Value Date    INR 1.24 (H) 03/30/2024    PROTIME 15.6 (H) 03/30/2024     Lab Results   Component Value Date    CREATININE 0.9 04/04/2024    BUN 20 04/04/2024     (L) 04/04/2024    K 4.0 04/04/2024    CL 98 (L) 04/04/2024    CO2 27 04/04/2024     Lab Results   Component Value Date    ALT 23 03/30/2024    AST 22 03/30/2024    ALKPHOS 60 03/30/2024    BILITOT 0.8 03/30/2024     Most recent echocardiogram revealed EF 40-45%, grade 3 diastolic dysfunction.     Most recent EKG revealed atrial fibrillation.      IMAGING     MRI Brain WO contrast 4/1/24  1.  Acute 4 mm infarct in the dorsal right jm.  2.  Mild to moderate chronic small vessel ischemic disease.  3.  Old infarcts in the left temporal lobe and right cerebellar hemisphere.     CTA Head and Neck W contast 3/30/24  Mild atherosclerosis, otherwise unremarkable CT angiogram of the head and  neck.     XR Chest 3/30/24  1. No acute cardiopulmonary process identified.  2. Moderate cardiomegaly.     CT C spine 3/30/24  No acute abnormality of the cervical spine.      CT Head WO contrast 3/30/24  No acute intracranial abnormality.        The above laboratory data have been reviewed.   The above imaging data have been reviewed.   The above medical testing have been reviewed.     Body mass index is 32.6 kg/m².    Barriers to Discharge: level of assistance, endurance, comorbidities    POST ADMISSION PHYSICIAN EVALUATION  The patient has agreed to being admitted to our comprehensive inpatient rehabilitation facility consisting of at least 180 minutes of therapy a day, 5 out of 7  days a week.     The patient/family has a good understanding of our discharge process. The patient has potential to make improvement and is in need of at least two of the following multidisciplinary therapies including but not limited to physical, occupational, respiratory, and speech, nutritional services, wound care, and prosthetics and orthotics. Given the patients complex condition and risk of further medical complications, rehabilitation services cannot be safely provided at a lower level of care such as a skilled nursing facility. I have compared the patients medical and functional status at the time of the preadmission screening and the same on this date, and there are no significant changes.     By signing this document, I acknowledge that I have personally performed a full physical examination on this patient within 24 hours of admission to this inpatient rehabilitation facility and have determined the patient to be able to tolerate the above course of treatment at an intensive level for a reasonable period of time. I will be completing a detailed individualized Plan of Care for this patient by day four of the patients stay based upon the Preadmission Screen, this Post-Admission Evaluation, and the therapy evaluations.    Rehabilitation Diagnosis:  Stroke, 1.4, No Paresis    Assessment and Plan:  Maria E Valdez is an 89 year old female with a past medical history significant for permanent atrial fibrillation on AC, COPD, HFrEF, DM, HTN, and OA who presented to [facility] on 3/30/[facility] with dizziness and falls, found to have acute dorsal right jm CVA. She was admitted to [facility] on 4/3/24 due to functional deficits below her baseline.     Dorsal Right Jm CVA  - secondary stroke prevention with asa, statin, Eliquis  - meclizine PRN for dizziness  - PT, OT, ST    Urinary Retention  - seen by Urology during acute stay  - flomax discontinued due to increased risk for falls and dizziness  - pollack removed 4/3  -

## 2024-04-04 NOTE — PROGRESS NOTES
Comprehensive Nutrition Assessment    Type and Reason for Visit:  Initial, Consult    Nutrition Recommendations/Plan:   Continue carb controlled diet  Encourage po intakes  Monitor po intakes, nutrition adequacy, weights, pertinent labs, BMs     Malnutrition Assessment:  Malnutrition Status:  No malnutrition (04/04/24 1349)      Nutrition Assessment:    Consult for oral nutrition supplements. New ARU pt. Pt with PMHx of DM2, afib, COPD, initially admitted d/t dizziness and frequent falls. Pt currently on a carb controlled diet. PO intakes % per EMR. Pt reports that her appetite has been okay and denied having any changes in appetite. Pt reports that she is not used to receiving meals so close together and was not able to eat much at lunch. Pt endorses having some intentional weight loss PTA d/t cutting back on how much she eats. TIFFANY d/t lack of weight Hx in EMR. Pt declined ONS at this time. Pt denied having any nturition questions or education needs. Will mary.    Nutrition Related Findings:    +1 pitting BLE edema. Last BM 4/3. -251 x 24 hr. Wound Type: None       Current Nutrition Intake & Therapies:    Average Meal Intake: 26-50%, 51-75%, %  Average Supplements Intake: None Ordered  ADULT DIET; Regular; 4 carb choices (60 gm/meal)    Anthropometric Measures:  Height: 160 cm (5' 2.99\")  Ideal Body Weight (IBW): 115 lbs (52 kg)       Current Body Weight: 83.5 kg (184 lb),   IBW. Weight Source: Standing Scale  Current BMI (kg/m2): 32.6                          BMI Categories: Obese Class 1 (BMI 30.0-34.9)    Estimated Daily Nutrient Needs:  Energy Requirements Based On: Kcal/kg  Weight Used for Energy Requirements: Ideal  Energy (kcal/day): 3597-8314  Weight Used for Protein Requirements: Ideal  Protein (g/day): 52-62  Method Used for Fluid Requirements: 1 ml/kcal  Fluid (ml/day): 8453-7594    Nutrition Diagnosis:   Predicted inadequate energy intake related to inadequate protein-energy intake

## 2024-04-04 NOTE — PROGRESS NOTES
Speech Language Pathology  Clinical Bedside Swallow Assessment  Facility/Department: [facility]        Recommendations:  Diet recommendation: IDDSI 7 Regular Solids; IDDSI 0 Thin Liquids; Meds PO as tolerated  Instrumentation: pt may benefit from completion of instrumental swallow study to correlate clinical findings, particularly in light of location of pt's acute infarct (R jm). Will continue to monitor   Risk management: upright for all intake, stay upright for at least 30 mins after intake, small bites/sips, oral care 2-3x/day to reduce adverse affects in the event of aspiration, increase physical mobility as able, alternate bites/sips, slow rate of intake, general GERD precautions, general aspiration precautions, and hold PO and contact SLP if s/s of aspiration or worsening respiratory status develop.       NAME:Maria E Valdez  : 8/3/1934 (89 y.o.)   MRN: [id number]  ROOM: [id number]  ADMISSION DATE: 4/3/2024  PATIENT DIAGNOSIS(ES): Late effects of cerebral ischemic stroke [I69.30]  No chief complaint on file.    Patient Active Problem List    Diagnosis Date Noted    Head injury 2024    Anticoagulated by anticoagulation treatment 2024    Fall 2024    Cerebrovascular accident (CVA) (HCC) 2024    Late effects of cerebral ischemic stroke 2024    Stroke-like symptoms 2024    Acute on chronic combined systolic and diastolic CHF (congestive heart failure) (HCC) 2024    Permanent atrial fibrillation (HCC) 2024    Hypercholesterolemia 2024    Dizziness 2024    Elevated troponin 2024    Frequent falls 2024    Acute idiopathic gout of left foot 2016    Anxiety 2016    Restless legs syndrome 11/10/2015    Strain of tibialis anterior muscle 2015    Lumbar strain 2015    Hypertension 2015    Elevated lipids 2015    Controlled type 2 diabetes mellitus with hyperglycemia, with long-term current use of insulin  Goals:  8 Days (04/10/24)    Goal 1: The patient will tolerate recommended diet with no clinical s/s of aspiration 5/5  Goal 2: The patient/caregiver will demonstrate understanding of compensatory swallow strategies, for improved swallow safety    Long Term Goals:   Timeframe for Long Term Goals: 10 Days (04/12/24)   Goal 1: The patient will tolerate least restrictive diet with no clinical s/s of aspiration or worsening respiratory/pulmonary status      Treatment:  Skilled instruction completed with patient re: evidenced based practice regarding recommendations and POC, importance of oral care to reduce adverse affects in the event of aspiration, and instruction of recommended compensatory strategies developed based upon clinical exam. Pt able to recall/demonstrate compensatory strategies with min cues      Pt Education: SLP educated the patient re: Role of SLP, rationale for completion of assessment, results of assessment, recommendations, and POC. Rationale of BSE s/p jm CVA and how swallowing can be impacted   Pt Education Response: verbalized understanding and would benefit from ongoing education    Duration/Frequency of Tx:  60 min; 5 days per week for 10 days     Individuals Consulted:   [x]  Patient     []  NP         [x]  RN   []  RD                   []  MD      []  Family Member                        []  PA    []  Other:      Safety Devices / Report:   [x]  All fall risk precautions in place []  Safety handoff completed with RN  []  Bed alarm in place  []  Left in bed     [x]  Chair alarm in place  []  Left in chair   [x]  Call light in reach   []  Other:       Total Treatment Time / Charges       Time in Time out Total Time / units   Swallow Eval/Tx Time  1115 1130 15 min / 1 unit (DE)     Signature:  Chanda Browne MA CCC-SLP #[id number]  Speech Language Pathologist

## 2024-04-04 NOTE — PLAN OF CARE
SLP completed evaluation. Please refer to notes in EMR.      Chanda Browne MA CCC-SLP #[id number]  Speech Language Pathologist

## 2024-04-04 NOTE — PROGRESS NOTES
Occupational Therapy  Facility/Department: [facility]  Rehabilitation Occupational Therapy Evaluation       Date: 24  Patient Name: Maria E Valdez       Room: 0154/0154-01  MRN: [id number]  Account: [id number]   : 8/3/1934  (89 y.o.) Gender: female     Referring Practitioner: Zach  Diagnosis: Late effects of cerebral ischemic stroke       Restrictions  Restrictions/Precautions: General Precautions;Fall Risk;Up as Tolerated;Bed Alarm  Other position/activity restrictions: fall risk,  Required Braces or Orthoses?: No    Subjective   Pt seated EOB and eating breakfast, agreeable to OT evaluation. Reports needing to use bathroom.           Vitals  Temp: 98.6 °F (37 °C)  Pulse: (!) 101  Respirations: 16  BP: 125/74  Weight - Scale: 83.5 kg (184 lb)  BMI (Calculated): 32.7  Oxygen Therapy  SpO2: 93 %  O2 Device: None (Room air)  Level of Consciousness: Alert (0)    Objective     Cognition  Overall Cognitive Status: Exceptions  Arousal/Alertness: Appropriate responses to stimuli  Following Commands: Follows one step commands with increased time;Follows one step commands with repetition  Attention Span: Attends with cues to redirect  Memory: Decreased short term memory;Decreased recall of recent events  Safety Judgement: Decreased awareness of need for assistance;Decreased awareness of need for safety  Problem Solving: Assistance required to generate solutions;Assistance required to identify errors made;Assistance required to implement solutions  Insights: Decreased awareness of deficits  Initiation: Requires cues for some  Sequencing: Requires cues for some  Cognition Comment: Minimal difficulty expressing wants/ideas. Pt required inc vbc's to orient to situation  Orientation  Orientation Level: Oriented to person;Oriented to time;Oriented to place;Disoriented to situation (\"they had to help me up\")   Perception  Overall Perceptual Status: WFL  Sensation  Overall Sensation Status: WFL     ROM  LUE AROM  watch TV  Additional Comments: Pt is a questionable historian, having difficulty answering home set up questions/ understanding what is being asked. Will confirm with family    ADL  Feeding: Independent  Feeding Skilled Clinical Factors: seated EOB eating breakfast  Grooming: Setup  Grooming Skilled Clinical Factors: seated on TTB to complete oral care  UE Bathing: Supervision  UE Bathing Skilled Clinical Factors: seated on TTB  LE Bathing: Stand by assistance  LE Bathing Skilled Clinical Factors: seated to wash BLE, in standing to wash/dry buttocks with LUE holding onto grab bar  UE Dressing: Supervision  UE Dressing Skilled Clinical Factors: seated to doff gown and don pullover shirt  LE Dressing: Minimal assistance;Maximum assistance  LE Dressing Skilled Clinical Factors: assist to thread LLE into brief, pt able to thread RLE into brief/pants and SBA to don/doff over hips; max A to don bilat socks and shoes  Toileting: Stand by assistance  Toileting Skilled Clinical Factors: seated for bowel/bladder hygiene, continent of bowel/bladder, SBA in standing to don/doff brief  Functional Mobility: Stand by assistance  Functional Mobility Skilled Clinical Factors: with RW  Skin Care: Bath wipes;Soap and water    Goals  Patient Goals   Patient goals : \"to not fall again\"  Short Term Goals  Time Frame for Short Term Goals: 6 days (4/08/24)  Short Term Goal 1: Pt will complete toilet/functional transfer SPV  Short Term Goal 2: Pt will complete LB dressing with SBA and AE  Short Term Goal 3: Pt will tolerate standing at sink to complete 1-2 grooming tasks with SBA  Short Term Goal 4: Pt will complete simple IADL with SBA  Long Term Goals  Time Frame for Long Term Goals : 10 days (4/12/24)  Long Term Goal 1: Pt will complete toilet/functional transfer mod I  Long Term Goal 2: Pt will complete TB dressing mod I  Long Term Goal 3: Pt will tolerate standing for 5 mins to inc ADL participation mod I  Long Term Goal 4: Pt will

## 2024-04-04 NOTE — PROGRESS NOTES
Physical Therapy  Facility/Department: [facility]  Rehabilitation Physical Therapy Initial Assessment/Treatment    NAME: Maria E Valdez  : 8/3/1934 (89 y.o.)  MRN: [id number]  CODE STATUS: Full Code    Date of Service: 24      Past Medical History:   Diagnosis Date    Diabetes (HCC)     Osteoarthrosis 2013     Past Surgical History:   Procedure Laterality Date    KNEE SURGERY         Chart Reviewed: Yes  Patient assessed for rehabilitation services?: Yes  Family / Caregiver Present: No  Referring Practitioner: Charlotte Serrano MD  Referral Date : 24  General Comment  Comments: RN cleared pt for therapy    Restrictions:  Restrictions/Precautions: General Precautions;Fall Risk;Up as Tolerated;Bed Alarm  Position Activity Restriction  Other position/activity restrictions: fall risk,     SUBJECTIVE  Subjective: pt found in chair, agreeable to therapy  Pain: reports 4/10 headache       Prior Level of Function:  Social/Functional History  Lives With: Alone  Type of Home: Assisted living  Home Layout: One level  Home Access: Level entry  Bathroom Shower/Tub: Walk-in shower  Bathroom Toilet: Handicap height  Bathroom Equipment: Grab bars in shower, Grab bars around toilet, Shower chair  Bathroom Accessibility: Accessible  Home Equipment: Walker, rolling, Cane, Rollator, Sock aid (uses RW primarily)  Has the patient had two or more falls in the past year or any fall with injury in the past year?: Yes (\"12 in the last month\" that led to admission)  ADL Assistance: Independent  Bath: Independent  Dressing: Independent  Grooming: Independent  Feeding: Independent  Toileting: Independent  Homemaking Assistance: Independent  Homemaking Responsibilities: Yes  Meal Prep Responsibility: Primary  Cleaning Responsibility: Secondary (has cleaning service assist 1x/month)  Ambulation Assistance: Independent (with RW)  Transfer Assistance: Independent  Active : Yes  Occupation: Retired  Leisure & Hobbies:

## 2024-04-04 NOTE — CARE COORDINATION
appointments, work, or getting things I need  [x] No  [] Pt unable to respond  [] Pt declines to respond     How often do you need to have someone help you when you read instructions, pamphlets, or other written material from your doctor or pharmacy? [] Never                             [] Always  [] Rarely                            [] Patient unable to respond  [x] Sometimes                     [] Pt declines to respond  [] Often   Active with: [] Senior services        [] [place] on aging         [] Other:         Dialysis Facility (if applicable) Name:  Address:  Dialysis Schedule:  Phone:  Fax:       Support system at home and in the community: Family   Caregiver physical ability: [x] Cue patient for safety  [] Physical lift/lower: [] Light [] Moderate [] Heavy  [x] Cook / Clean  [x] Transport   Who will be the one to contact for family training: cristy   24 hour care on discharge?: yes   What level does the patient need to be at to return home:  Mod I   Discharge plan:  24 hour supervision or assist;Home with Home health    Barriers to discharge: Patient progress, 24 hr assist, lives alone       Ethnicity: Are you of , /a, or Lithuanian origin?  [x] No, not of , /a, or Lithuanian origin  [] Yes, North Korean, North Korean American, Chicano/a  [] Yes, Georgian  [] Yes, Ricky  [] Yes, another , , or Lithuanian origin  [] Pt unable to respond  [] Pt declines to respond     Race: [x] White                                                [] Gabonese              []   [] Black or                 [] Faroese          [] Bahamian or Coretta  []  or      [] Armenian             [] Taiwanese  []  Nicaraguan                                      [] Bulgarian      [] Other   [] Chinese                                             [] Other        [] Pt unable to respond                      [] Pt declines to respond                   [] None of the above   Preferred Language: Arlene     [facility] on chart for MD Signature.     Signature:  Citlali Thompson RN

## 2024-04-05 LAB
ANION GAP SERPL CALCULATED.3IONS-SCNC: 12 MMOL/L (ref 3–16)
BASOPHILS # BLD: 0 K/UL (ref 0–0.2)
BASOPHILS NFR BLD: 0.4 %
BUN SERPL-MCNC: 19 MG/DL (ref 7–20)
CALCIUM SERPL-MCNC: 9.5 MG/DL (ref 8.3–10.6)
CHLORIDE SERPL-SCNC: 96 MMOL/L (ref 99–110)
CO2 SERPL-SCNC: 26 MMOL/L (ref 21–32)
CREAT SERPL-MCNC: 0.9 MG/DL (ref 0.6–1.2)
DEPRECATED RDW RBC AUTO: 16.9 % (ref 12.4–15.4)
EOSINOPHIL # BLD: 0.4 K/UL (ref 0–0.6)
EOSINOPHIL NFR BLD: 4.1 %
GFR SERPLBLD CREATININE-BSD FMLA CKD-EPI: 61 ML/MIN/{1.73_M2}
GLUCOSE BLD-MCNC: 141 MG/DL (ref 70–99)
GLUCOSE BLD-MCNC: 144 MG/DL (ref 70–99)
GLUCOSE BLD-MCNC: 158 MG/DL (ref 70–99)
GLUCOSE BLD-MCNC: 215 MG/DL (ref 70–99)
GLUCOSE SERPL-MCNC: 210 MG/DL (ref 70–99)
HCT VFR BLD AUTO: 41.6 % (ref 36–48)
HGB BLD-MCNC: 13.6 G/DL (ref 12–16)
LYMPHOCYTES # BLD: 0.9 K/UL (ref 1–5.1)
LYMPHOCYTES NFR BLD: 10.8 %
MAGNESIUM SERPL-MCNC: 1.5 MG/DL (ref 1.8–2.4)
MCH RBC QN AUTO: 30.3 PG (ref 26–34)
MCHC RBC AUTO-ENTMCNC: 32.6 G/DL (ref 31–36)
MCV RBC AUTO: 93 FL (ref 80–100)
MONOCYTES # BLD: 0.6 K/UL (ref 0–1.3)
MONOCYTES NFR BLD: 6.9 %
NEUTROPHILS # BLD: 6.7 K/UL (ref 1.7–7.7)
NEUTROPHILS NFR BLD: 77.8 %
PERFORMED ON: ABNORMAL
PLATELET # BLD AUTO: 248 K/UL (ref 135–450)
PMV BLD AUTO: 8 FL (ref 5–10.5)
POTASSIUM SERPL-SCNC: 4.1 MMOL/L (ref 3.5–5.1)
RBC # BLD AUTO: 4.47 M/UL (ref 4–5.2)
SODIUM SERPL-SCNC: 134 MMOL/L (ref 136–145)
WBC # BLD AUTO: 8.7 K/UL (ref 4–11)

## 2024-04-05 PROCEDURE — 6370000000 HC RX 637 (ALT 250 FOR IP): Performed by: STUDENT IN AN ORGANIZED HEALTH CARE EDUCATION/TRAINING PROGRAM

## 2024-04-05 PROCEDURE — 97530 THERAPEUTIC ACTIVITIES: CPT

## 2024-04-05 PROCEDURE — 97129 THER IVNTJ 1ST 15 MIN: CPT

## 2024-04-05 PROCEDURE — 80048 BASIC METABOLIC PNL TOTAL CA: CPT

## 2024-04-05 PROCEDURE — 83735 ASSAY OF MAGNESIUM: CPT

## 2024-04-05 PROCEDURE — 97535 SELF CARE MNGMENT TRAINING: CPT

## 2024-04-05 PROCEDURE — 85025 COMPLETE CBC W/AUTO DIFF WBC: CPT

## 2024-04-05 PROCEDURE — 92526 ORAL FUNCTION THERAPY: CPT

## 2024-04-05 PROCEDURE — 97130 THER IVNTJ EA ADDL 15 MIN: CPT

## 2024-04-05 PROCEDURE — 1280000000 HC REHAB R&B

## 2024-04-05 PROCEDURE — 36415 COLL VENOUS BLD VENIPUNCTURE: CPT

## 2024-04-05 PROCEDURE — 97110 THERAPEUTIC EXERCISES: CPT

## 2024-04-05 PROCEDURE — 97116 GAIT TRAINING THERAPY: CPT

## 2024-04-05 PROCEDURE — 94640 AIRWAY INHALATION TREATMENT: CPT

## 2024-04-05 RX ORDER — LANOLIN ALCOHOL/MO/W.PET/CERES
400 CREAM (GRAM) TOPICAL 2 TIMES DAILY
Status: DISPENSED | OUTPATIENT
Start: 2024-04-05

## 2024-04-05 RX ORDER — INSULIN GLARGINE 100 [IU]/ML
12 INJECTION, SOLUTION SUBCUTANEOUS NIGHTLY
Status: DISCONTINUED | OUTPATIENT
Start: 2024-04-05 | End: 2024-04-06

## 2024-04-05 RX ADMIN — METOPROLOL SUCCINATE 25 MG: 25 TABLET, FILM COATED, EXTENDED RELEASE ORAL at 09:12

## 2024-04-05 RX ADMIN — APIXABAN 2.5 MG: 2.5 TABLET, FILM COATED ORAL at 09:12

## 2024-04-05 RX ADMIN — TIOTROPIUM BROMIDE AND OLODATEROL 2 PUFF: 3.124; 2.736 SPRAY, METERED RESPIRATORY (INHALATION) at 07:16

## 2024-04-05 RX ADMIN — BUPROPION HYDROCHLORIDE 300 MG: 150 TABLET, EXTENDED RELEASE ORAL at 09:12

## 2024-04-05 RX ADMIN — INSULIN GLARGINE 12 UNITS: 100 INJECTION, SOLUTION SUBCUTANEOUS at 20:45

## 2024-04-05 RX ADMIN — Medication 400 MG: at 09:12

## 2024-04-05 RX ADMIN — ASPIRIN 81 MG 81 MG: 81 TABLET ORAL at 09:12

## 2024-04-05 RX ADMIN — INSULIN LISPRO 2 UNITS: 100 INJECTION, SOLUTION INTRAVENOUS; SUBCUTANEOUS at 11:40

## 2024-04-05 RX ADMIN — FUROSEMIDE 20 MG: 20 TABLET ORAL at 09:12

## 2024-04-05 RX ADMIN — SPIRONOLACTONE 25 MG: 25 TABLET ORAL at 09:12

## 2024-04-05 RX ADMIN — ATORVASTATIN CALCIUM 20 MG: 10 TABLET, FILM COATED ORAL at 09:11

## 2024-04-05 RX ADMIN — Medication 400 MG: at 20:44

## 2024-04-05 RX ADMIN — APIXABAN 2.5 MG: 2.5 TABLET, FILM COATED ORAL at 20:44

## 2024-04-05 RX ADMIN — DULOXETINE HYDROCHLORIDE 60 MG: 60 CAPSULE, DELAYED RELEASE ORAL at 09:12

## 2024-04-05 RX ADMIN — LISINOPRIL 5 MG: 5 TABLET ORAL at 09:12

## 2024-04-05 RX ADMIN — Medication 3 MG: at 20:44

## 2024-04-05 ASSESSMENT — PAIN SCALES - GENERAL
PAINLEVEL_OUTOF10: 0
PAINLEVEL_OUTOF10: 0

## 2024-04-05 NOTE — PROGRESS NOTES
[facility]: ACUTE REHAB UNIT  SPEECH-LANGUAGE PATHOLOGY      [x] Daily  [] Weekly Care Conference Note  [] Discharge    Patient:Maria E Valdez      :8/3/1934  MRN:[id number]  Rehab Dx/Hx: Late effects of cerebral ischemic stroke [I69.30]    Precautions: falls and aspirations  Home situation: Lives alone at [facility] Dx: [] Aphasia  [] Dysarthria  [] Apraxia   [x] Oropharyngeal dysphagia [x] Cognitive Impairment  [] Other:   Date of Admit: 4/3/2024  Room #: 0154/0154-01    Current functional status (updated daily):         Pt being seen for : [] Speech/Language Treatment  [x] Dysphagia Treatment [x] Cognitive Treatment  [] Other:  Communication: []WFL  [] Aphasia  [] Dysarthria  [] Apraxia  [] Pragmatic Impairment [] Non-verbal  [] Hearing Loss  [] Other:   Cognition: [] WFL  [] Mild  [] Moderate  [] Severe [] Unable to Assess  [] Other:  Memory: [] WFL  [] Mild  [] Moderate  [] Severe [] Unable to Assess  [] Other:  Behavior: [] Alert  [] Cooperative  []  Pleasant  [] Confused  [] Agitated  [] Uncooperative  [] Distractible [] Motivated  [] Self-Limiting [] Anxious  [] Other:  Endurance:  [] Adequate for participation in SLP sessions  [] Reduced overall  [] Lethargic  [] Other:  Safety: [] No concerns at this time  [] Reduced insight into deficits  []  Reduced safety awareness [] Not following call light procedures  [] Unable to Assess  [] Other:    Current Diet Order:ADULT DIET; Regular; 4 carb choices (60 gm/meal)  Swallowing Precautions: Sit up for all meals and thereafter for 30 minutes and Eat with small bites (1/2 tsp; 1 tsp)        Date: 2024      Tx session 1  6682-6561 Tx session 2   Total Timed Code Min 45    Total Treatment Minutes 60    Individual Treatment Minutes 60    Group Treatment Minutes 0 0   Co-Treat Minutes 0 0   Variance/Reason:  N/A    Pain denies    Pain Intervention [] RN notified  [] Repositioned  [] Intervention offered and patient declined  [x] N/A  [] Other: [] RN

## 2024-04-05 NOTE — PROGRESS NOTES
Occupational Therapy  Facility/Department: [facility]  Rehabilitation Occupational Therapy Daily Treatment Note    Date: 24  Patient Name: Maria E Valdez       Room: 0154/0154-01  MRN: [id number]  Account: [id number]   : 8/3/1934  (89 y.o.) Gender: female         Past Medical History:  has a past medical history of Diabetes (HCC) and Osteoarthrosis.  Past Surgical History:   has a past surgical history that includes knee surgery.    Restrictions  Restrictions/Precautions: General Precautions, Fall Risk, Up as Tolerated, Bed Alarm  Other position/activity restrictions: fall risk,  Required Braces or Orthoses?: No    Subjective  Subjective: Pt resting in recliner and agreeable to OT. Vitals WFL  Restrictions/Precautions: General Precautions;Fall Risk;Up as Tolerated;Bed Alarm             Objective     Cognition  Overall Cognitive Status: Exceptions  Arousal/Alertness: Appropriate responses to stimuli  Following Commands: Follows one step commands with increased time;Follows one step commands with repetition  Attention Span: Attends with cues to redirect  Memory: Decreased short term memory;Decreased recall of recent events  Safety Judgement: Decreased awareness of need for assistance;Decreased awareness of need for safety  Problem Solving: Assistance required to generate solutions;Assistance required to identify errors made;Assistance required to implement solutions;Decreased awareness of errors  Insights: Decreased awareness of deficits  Initiation: Requires cues for some  Sequencing: Requires cues for some  Cognition Comment: Minimal difficulty expressing wants/ideas. Pt required inc vbc's to orient to situation  Orientation  Overall Orientation Status: Impaired  Orientation Level: Oriented to person;Oriented to time;Oriented to place;Oriented to situation         ADL  Upper Extremity Dressing  Assistance Level: Supervision;Increased time to complete  Skilled Clinical Factors: seated to don/doff pullover  shirt          Functional Mobility  Device: Rolling walker  Activity: To/From therapy gym;To/From bathroom  Assistance Level: Contact guard assist  Skilled Clinical Factors: amb with RW throughout session; 4-5 LOB noted when standing still or turnin RW, CGA to correct  Transfers  Surface: To chair with arms;From chair with arms  Additional Factors: Verbal cues;Hand placement cues;Increased time to complete;Set-up  Device: Walker  Sit to Stand  Assistance Level: Contact guard assist  Skilled Clinical Factors: multiple throughout session  Stand to Sit  Assistance Level: Contact guard assist  Skilled Clinical Factors: multiple throughout session   OT Exercises  Functional Mobility Circuit Training: Pt stood at table to complete cognitive/alphabet task by placing velco letters on board in alphabetic order. Pt required max vbc's for sequencing letters and recalling food items that correspond with letter. Pt stood 7:30, 7:00 and 4:30 with CGA and 1-2 LOB when turning RW.     Assessment  Assessment  Assessment: Pt progessed to SPV for UB dressing while seated. Pt amb <> therapy gym with RW and CGA, 4-5 LOB noted when turning RW. Pt demo'd difficulty recalling food items that started with alphabet letters and sequencing alphabet letters in correct order, requiring max vbc's. Pt demo'd improved standing endurance, standing for 7:30, 7:00, and 4:30 mins with CGA. Pt with 1-2 LOB when turning RW during task, CGA to correct. Pt with dec awareness of location and purpose of IPR schedule. Continue OT POC.  Activity Tolerance: Patient tolerated evaluation without incident;Patient limited by fatigue;Patient limited by endurance  Discharge Recommendations: 24 hour supervision or assist;Continue to assess pending progress  Factors Affecting Discharge: Pt will require 24/7 SPV d/t decreased safety awareness and LOB when amb.  OT Equipment Recommendations  Equipment Needed:  (CTA)  Safety Devices  Safety Devices in place: Yes  Type of

## 2024-04-05 NOTE — PLAN OF CARE
At risk for skin breakdown, see Jonathan scale. Unable to repositin self in bed. Turn  and reposition every 2 hours. Heels elevated off bed. Protective barrier placed as needed. Patient kept clean and dry. Pillows used for positioning. Will continue to monitor for skin breakdown.

## 2024-04-05 NOTE — PROGRESS NOTES
Maria E Valdez  4/5/2024  [id number]    Chief Complaint: Late effects of cerebral ischemic stroke    Subjective:   No acute events overnight. Today Jenn is seen in her room with nursing present. She reports some difficulty swallowing pills. Will discuss with Speech.     ROS: denies f/c, n/v, cp, sob    Objective:  Patient Vitals for the past 24 hrs:   BP Temp Temp src Pulse Resp SpO2 Weight   04/05/24 0900 (!) 142/103 97.3 °F (36.3 °C) Oral 85 18 95 % --   04/05/24 0718 -- -- -- -- -- 95 % --   04/05/24 0617 -- -- -- -- -- -- 80 kg (176 lb 4.8 oz)   04/04/24 2025 (!) 148/71 98.2 °F (36.8 °C) Oral 76 18 95 % --   04/04/24 1242 135/84 -- -- 84 -- 93 % --     Gen: No distress, pleasant.   HEENT: Normocephalic, atraumatic.   CV: extremities well perfused  Resp: No respiratory distress.   Abd: Soft, nondistended  Ext: No edema.   Neuro: Alert, oriented, appropriately interactive.     Wt Readings from Last 3 Encounters:   04/05/24 80 kg (176 lb 4.8 oz)   04/03/24 84.6 kg (186 lb 8 oz)   05/18/19 88.5 kg (195 lb)       Laboratory data:   Lab Results   Component Value Date    WBC 8.7 04/05/2024    HGB 13.6 04/05/2024    HCT 41.6 04/05/2024    MCV 93.0 04/05/2024     04/05/2024       Lab Results   Component Value Date/Time     04/05/2024 08:47 AM    K 4.1 04/05/2024 08:47 AM    K 4.0 04/04/2024 05:47 AM    CL 96 04/05/2024 08:47 AM    CO2 26 04/05/2024 08:47 AM    BUN 19 04/05/2024 08:47 AM    CREATININE 0.9 04/05/2024 08:47 AM    GLUCOSE 210 04/05/2024 08:47 AM    CALCIUM 9.5 04/05/2024 08:47 AM        Therapy progress:  Physical therapy:  Bed Mobility:     Sit>supine:     Supine>sit:     Transfers:     Sit>stand:     Stand>sit:     Bed<>chair     Stand Pivot:     Lateral transfer:     Car transfer:     Ambulation:     Stairs:     Curb:     Wheelchair:       Occupational therapy:   Feeding     Grooming/Oral Hygiene     UE Bathing     LE Bathing     UE Dressing     LE Dressing     Putting On/Taking Off

## 2024-04-05 NOTE — PLAN OF CARE
Problem: Safety - Adult  Goal: Free from fall injury  Outcome: Progressing     Problem: Skin/Tissue Integrity  Goal: Absence of new skin breakdown  Description: 1.  Monitor for areas of redness and/or skin breakdown  2.  Assess vascular access sites hourly  3.  Every 4-6 hours minimum:  Change oxygen saturation probe site  4.  Every 4-6 hours:  If on nasal continuous positive airway pressure, respiratory therapy assess nares and determine need for appliance change or resting period.  4/5/2024 0906 by Richie Goodson, RN  Outcome: Progressing  4/4/2024 2215 by Tammie Joiner, RN  Outcome: Progressing

## 2024-04-05 NOTE — PROGRESS NOTES
Physical Therapy  Facility/Department: [facility]  Rehabilitation Physical Therapy Treatment Note    NAME: Maria E Valdez  : 8/3/1934 (89 y.o.)  MRN: [id number]  CODE STATUS: Full Code    Date of Service: 24       Restrictions:  Restrictions/Precautions: General Precautions, Fall Risk, Up as Tolerated, Bed Alarm  Position Activity Restriction  Other position/activity restrictions: fall risk,     SUBJECTIVE  Subjective  Subjective: pt found in w/c  Pain: denies pain when asked      \  OBJECTIVE  Cognition  Overall Cognitive Status: Exceptions  Arousal/Alertness: Appropriate responses to stimuli  Following Commands: Follows one step commands with increased time;Follows one step commands with repetition  Attention Span: Attends with cues to redirect  Memory: Decreased short term memory;Decreased recall of recent events  Safety Judgement: Decreased awareness of need for assistance;Decreased awareness of need for safety  Problem Solving: Assistance required to generate solutions;Assistance required to identify errors made;Assistance required to implement solutions  Insights: Decreased awareness of deficits  Initiation: Requires cues for some  Sequencing: Requires cues for some  Cognition Comment: Minimal difficulty expressing wants/ideas. Pt required inc vbc's to orient to situation  Orientation  Overall Orientation Status: Impaired  Orientation Level: Oriented to person;Oriented to time;Oriented to place;Oriented to situation    Functional Mobility  Sit to Supine  Assistance Level: Stand by assist  Scooting  Assistance Level: Stand by assist  Skilled Clinical Factors: with cues to scoot to HOB with bed rails  Sit to Stand  Assistance Level: Contact guard assist;Stand by assist  Skilled Clinical Factors: from wc to RW  Stand to Sit  Assistance Level: Stand by assist;Contact guard assist  Bed To/From Chair  Assistance Level: Contact guard assist  Skilled Clinical Factors: from EOB to wc with bed  in bed    EDUCATION  Education  Education Given To: Patient  Education Provided: Role of Therapy;Plan of Care;Mobility Training;Transfer Training;Safety;Energy Conservation;Fall Prevention Strategies;Equipment  Education Provided Comments: role of PT, IPR expectiations, safe use of RW and insight into deficits  Education Method: Verbal  Barriers to Learning: Cognition;Hearing        Therapy Time   Individual Concurrent Group Co-treatment   Time In 1230         Time Out 1330         Minutes 60           Timed Code Treatment Minutes: 60 Minutes       Sheree Johnson PT, 04/05/24 at 2:09 PM

## 2024-04-06 LAB
GLUCOSE BLD-MCNC: 154 MG/DL (ref 70–99)
GLUCOSE BLD-MCNC: 168 MG/DL (ref 70–99)
GLUCOSE BLD-MCNC: 185 MG/DL (ref 70–99)
GLUCOSE BLD-MCNC: 228 MG/DL (ref 70–99)
PERFORMED ON: ABNORMAL

## 2024-04-06 PROCEDURE — 97530 THERAPEUTIC ACTIVITIES: CPT

## 2024-04-06 PROCEDURE — 97110 THERAPEUTIC EXERCISES: CPT

## 2024-04-06 PROCEDURE — 97129 THER IVNTJ 1ST 15 MIN: CPT

## 2024-04-06 PROCEDURE — 94640 AIRWAY INHALATION TREATMENT: CPT

## 2024-04-06 PROCEDURE — 97535 SELF CARE MNGMENT TRAINING: CPT

## 2024-04-06 PROCEDURE — 1280000000 HC REHAB R&B

## 2024-04-06 PROCEDURE — 6370000000 HC RX 637 (ALT 250 FOR IP): Performed by: STUDENT IN AN ORGANIZED HEALTH CARE EDUCATION/TRAINING PROGRAM

## 2024-04-06 PROCEDURE — 97130 THER IVNTJ EA ADDL 15 MIN: CPT

## 2024-04-06 PROCEDURE — 92526 ORAL FUNCTION THERAPY: CPT

## 2024-04-06 PROCEDURE — 97112 NEUROMUSCULAR REEDUCATION: CPT

## 2024-04-06 PROCEDURE — 97116 GAIT TRAINING THERAPY: CPT

## 2024-04-06 RX ORDER — INSULIN GLARGINE 100 [IU]/ML
15 INJECTION, SOLUTION SUBCUTANEOUS NIGHTLY
Status: DISCONTINUED | OUTPATIENT
Start: 2024-04-06 | End: 2024-04-07

## 2024-04-06 RX ADMIN — ONDANSETRON 4 MG: 4 TABLET, ORALLY DISINTEGRATING ORAL at 13:12

## 2024-04-06 RX ADMIN — SPIRONOLACTONE 25 MG: 25 TABLET ORAL at 09:34

## 2024-04-06 RX ADMIN — APIXABAN 2.5 MG: 2.5 TABLET, FILM COATED ORAL at 20:26

## 2024-04-06 RX ADMIN — Medication 400 MG: at 09:33

## 2024-04-06 RX ADMIN — TIOTROPIUM BROMIDE AND OLODATEROL 2 PUFF: 3.124; 2.736 SPRAY, METERED RESPIRATORY (INHALATION) at 07:42

## 2024-04-06 RX ADMIN — ATORVASTATIN CALCIUM 20 MG: 10 TABLET, FILM COATED ORAL at 09:33

## 2024-04-06 RX ADMIN — Medication 400 MG: at 20:26

## 2024-04-06 RX ADMIN — METOPROLOL SUCCINATE 25 MG: 25 TABLET, FILM COATED, EXTENDED RELEASE ORAL at 09:33

## 2024-04-06 RX ADMIN — DULOXETINE HYDROCHLORIDE 60 MG: 60 CAPSULE, DELAYED RELEASE ORAL at 09:33

## 2024-04-06 RX ADMIN — FUROSEMIDE 20 MG: 20 TABLET ORAL at 09:33

## 2024-04-06 RX ADMIN — BUPROPION HYDROCHLORIDE 300 MG: 150 TABLET, EXTENDED RELEASE ORAL at 09:34

## 2024-04-06 RX ADMIN — APIXABAN 2.5 MG: 2.5 TABLET, FILM COATED ORAL at 09:00

## 2024-04-06 RX ADMIN — INSULIN GLARGINE 15 UNITS: 100 INJECTION, SOLUTION SUBCUTANEOUS at 20:26

## 2024-04-06 RX ADMIN — LISINOPRIL 5 MG: 5 TABLET ORAL at 09:33

## 2024-04-06 RX ADMIN — INSULIN LISPRO 2 UNITS: 100 INJECTION, SOLUTION INTRAVENOUS; SUBCUTANEOUS at 12:18

## 2024-04-06 RX ADMIN — Medication 3 MG: at 20:26

## 2024-04-06 RX ADMIN — ASPIRIN 81 MG 81 MG: 81 TABLET ORAL at 09:33

## 2024-04-06 ASSESSMENT — PAIN SCALES - GENERAL
PAINLEVEL_OUTOF10: 0
PAINLEVEL_OUTOF10: 0

## 2024-04-06 NOTE — PROGRESS NOTES
[facility]: ACUTE REHAB UNIT  SPEECH-LANGUAGE PATHOLOGY      [x] Daily  [] Weekly Care Conference Note  [] Discharge    Patient:Maria E Valdez      :8/3/1934  MRN:[id number]  Rehab Dx/Hx: Late effects of cerebral ischemic stroke [I69.30]    Precautions: falls and aspirations  Home situation: Lives alone at [facility] Dx: [] Aphasia  [] Dysarthria  [] Apraxia   [x] Oropharyngeal dysphagia [x] Cognitive Impairment  [] Other:   Date of Admit: 4/3/2024  Room #: 0154/0154-01    Current functional status (updated daily):         Pt being seen for : [] Speech/Language Treatment  [x] Dysphagia Treatment [x] Cognitive Treatment  [] Other:  Communication: []WFL  [] Aphasia  [] Dysarthria  [] Apraxia  [] Pragmatic Impairment [] Non-verbal  [] Hearing Loss  [] Other:   Cognition: [] WFL  [] Mild  [] Moderate  [] Severe [] Unable to Assess  [] Other:  Memory: [] WFL  [] Mild  [] Moderate  [] Severe [] Unable to Assess  [] Other:  Behavior: [] Alert  [] Cooperative  []  Pleasant  [] Confused  [] Agitated  [] Uncooperative  [] Distractible [] Motivated  [] Self-Limiting [] Anxious  [] Other:  Endurance:  [] Adequate for participation in SLP sessions  [] Reduced overall  [] Lethargic  [] Other:  Safety: [] No concerns at this time  [] Reduced insight into deficits  []  Reduced safety awareness [] Not following call light procedures  [] Unable to Assess  [] Other:    Current Diet Order:ADULT DIET; Regular; 4 carb choices (60 gm/meal)  Swallowing Precautions: Sit up for all meals and thereafter for 30 minutes and Eat with small bites (1/2 tsp; 1 tsp)        Date: 2024      Tx session 1  [id number] Tx session 2   Total Timed Code Min 60    Total Treatment Minutes 60    Individual Treatment Minutes 60    Group Treatment Minutes 0 0   Co-Treat Minutes 0 0   Variance/Reason:  N/A    Pain denies    Pain Intervention [] RN notified  [] Repositioned  [] Intervention offered and patient declined  [x] N/A  [] Other: [] RN  notified  [] Repositioned  [] Intervention offered and patient declined  [] N/A  [] Other:   Subjective     Pt seen sleeping in bed. SLP awoke pt for SLP tx, pt agreeable to tx this date.    Objective:  Goals     Short-term Goals  Timeframe for Short-term Goals: 8 Days (04/10/24)     Goal 1: The patient will tolerate recommended diet with no clinical s/s of aspiration 5/5 Pt seen with morning meal. Pt demonstrated x3 latent coughs and throat clear at end of meal, with slight change in vocal quality. SLP continue to monitor pt for swallow and instrumental assessment when able. Pt no awareness of dropped food on chest during meal. SLP helped clear after meal was concluded.        Goal 2: The patient/caregiver will demonstrate understanding of compensatory swallow strategies, for improved swallow safety     Pt instructed in safe swallow strategies (small bites/sips, upright position, not speaking with mouth full, oral care). Pt demonstrated slow rate of intake indep. SLP and pt discussed sitting upright so pt does not fall asleep of choke- pt indep positioned self to sit upright for morning meal.        Goal 3: Pt will verbalize orientation concepts x4 using visual aids across 3 sessions Pt oriented to self and year only. When asked about the place, pt noted that she was at home. When SLP asked pt to look around room, pt realized that she was not at home, but unable to state where she was.    SLP edu pt on place, date, situation. Pt acknowledged.    Pt stated that wall calendar was too far for her to see, even with glasses on. SLP moved therapy schedule closer to pt, and pt able to read with mod cues. SLP provided visual aid to pt regarding tx schedule, situation/place, and date in pt's notebook on bedside table.       Goal 4: Pt will complete graded recall tasks using compensatory strategies with 70% acc given mod cues   Pt able to recall day of the week, date, and month indep in delayed recall task long-term through

## 2024-04-06 NOTE — PROGRESS NOTES
Physical Therapy  Facility/Department: [facility]  Rehabilitation Physical Therapy Treatment Note    NAME: Maria E Valdez  : 8/3/1934 (89 y.o.)  MRN: [id number]  CODE STATUS: Full Code    Date of Service: 24       Restrictions:  Restrictions/Precautions: General Precautions, Fall Risk, Up as Tolerated, Bed Alarm  Position Activity Restriction  Other position/activity restrictions: fall risk,     SUBJECTIVE  Subjective  Subjective: Pt in chair on approach; agreeable to therapy  Pain: denies pain         OBJECTIVE  Cognition  Overall Cognitive Status: Exceptions  Arousal/Alertness: Appropriate responses to stimuli  Following Commands: Follows one step commands with increased time;Follows one step commands with repetition  Attention Span: Attends with cues to redirect  Memory: Decreased short term memory;Decreased recall of recent events  Safety Judgement: Decreased awareness of need for assistance;Decreased awareness of need for safety  Problem Solving: Assistance required to generate solutions;Assistance required to identify errors made;Assistance required to implement solutions;Decreased awareness of errors  Insights: Decreased awareness of deficits  Initiation: Requires cues for some  Sequencing: Requires cues for some  Cognition Comment: requring max VC and tc for directional commands and initating/ motor planning R/L sides of body    Functional Mobility  Sit to Stand  Assistance Level: Stand by assist  Skilled Clinical Factors: from various surfaces throughout session. Cues for hand placement  Stand to Sit  Assistance Level: Stand by assist      Environmental Mobility  Ambulation  Surface: Level surface  Device: Rolling walker  Distance: 154 ft + 115 ft  Assistance Level: Stand by assist  Gait Deviations: Narrow base of support;Decreased step length bilateral;Decreased trunk rotation;Decreased weight shift bilateral;Unsteady gait  Skilled Clinical Factors: Pt scanning environment for 8 cones and retreiving

## 2024-04-06 NOTE — PROGRESS NOTES
Maria E Valdez  4/6/2024  [id number]    Chief Complaint: Late effects of cerebral ischemic stroke    Subjective:   No acute events overnight. Today Jenn is seen in her room without family present. She reports feeling improved. She states that she was able to sleep better last night with melatonin. She endorses having more energy today.     ROS: denies f/c, n/v, cp, sob    Objective:  Patient Vitals for the past 24 hrs:   BP Temp Temp src Pulse Resp SpO2 Weight   04/06/24 1140 -- -- -- -- -- (!) 86 % --   04/06/24 0800 (!) 132/94 98 °F (36.7 °C) Oral 81 16 95 % --   04/06/24 0545 -- -- -- -- -- -- 80.7 kg (177 lb 14.4 oz)   04/05/24 2030 124/66 98.2 °F (36.8 °C) Oral -- 18 94 % --   04/05/24 1245 97/68 -- -- 87 -- 97 % --     Gen: No distress, pleasant. Seated up in chair  HEENT: Normocephalic, atraumatic.   CV: extremities well perfused  Resp: No respiratory distress. On room air  Abd: Soft, nondistended  Ext: No edema.   Neuro: Alert, oriented, appropriately interactive.   Psych: appropriate mood and affect    Wt Readings from Last 3 Encounters:   04/06/24 80.7 kg (177 lb 14.4 oz)   04/03/24 84.6 kg (186 lb 8 oz)   05/18/19 88.5 kg (195 lb)       Laboratory data:   Lab Results   Component Value Date    WBC 8.7 04/05/2024    HGB 13.6 04/05/2024    HCT 41.6 04/05/2024    MCV 93.0 04/05/2024     04/05/2024       Lab Results   Component Value Date/Time     04/05/2024 08:47 AM    K 4.1 04/05/2024 08:47 AM    K 4.0 04/04/2024 05:47 AM    CL 96 04/05/2024 08:47 AM    CO2 26 04/05/2024 08:47 AM    BUN 19 04/05/2024 08:47 AM    CREATININE 0.9 04/05/2024 08:47 AM    GLUCOSE 210 04/05/2024 08:47 AM    CALCIUM 9.5 04/05/2024 08:47 AM        Therapy progress:  Physical therapy:  Bed Mobility:     Sit>supine:  Assistance Level: Stand by assist  Supine>sit:     Transfers:     Sit>stand:  Assistance Level: Contact guard assist, Stand by assist  Skilled Clinical Factors: from wc to RW  Stand>sit:  Assistance Level:

## 2024-04-06 NOTE — PLAN OF CARE
Pt. Free from falls or self injury at this time. Falls risk protocol maintained. Call light within reach.

## 2024-04-06 NOTE — PROGRESS NOTES
Occupational Therapy  Facility/Department: [facility]  Rehabilitation Occupational Therapy Daily Treatment Note    Date: 24  Patient Name: Maria E Valdez       Room: 0154/0154-01  MRN: [id number]  Account: [id number]   : 8/3/1934  (89 y.o.) Gender: female                    Past Medical History:  has a past medical history of Diabetes (HCC) and Osteoarthrosis.  Past Surgical History:   has a past surgical history that includes knee surgery.    Restrictions  Restrictions/Precautions: General Precautions, Fall Risk, Up as Tolerated, Bed Alarm  Other position/activity restrictions: fall risk,  Required Braces or Orthoses?: No    Subjective  Subjective: pt toileting upon arrival wtih PCA, agreeable and motivated during OT session  Restrictions/Precautions: General Precautions;Fall Risk;Up as Tolerated;Bed Alarm             Objective     Cognition  Overall Cognitive Status: Exceptions  Arousal/Alertness: Appropriate responses to stimuli  Following Commands: Follows one step commands with increased time;Follows one step commands with repetition  Attention Span: Attends with cues to redirect  Memory: Decreased short term memory;Decreased recall of recent events  Safety Judgement: Decreased awareness of need for assistance;Decreased awareness of need for safety  Problem Solving: Assistance required to generate solutions;Assistance required to identify errors made;Assistance required to implement solutions;Decreased awareness of errors  Insights: Decreased awareness of deficits  Initiation: Requires cues for some  Sequencing: Requires cues for some  Cognition Comment: requring max VC and tc for directional commands and initating/ motor planning R/L sides of body         ADL  Grooming/Oral Hygiene  Assistance Level: Set-up;Supervision  Skilled Clinical Factors: seated on TTB  Upper Extremity Dressing  Assistance Level: Stand by assist;Minimal assistance;Increased time to complete  Skilled Clinical Factors: min A for  motor planning and sequencing donning bra overhead (clasping bra off body), SBA don /doff overhead shirt  Lower Extremity Dressing  Assistance Level: Stand by assist  Skilled Clinical Factors: don/doff pants seated EOB  Putting On/Taking Off Footwear  Assistance Level: Minimal assistance;Stand by assist  Skilled Clinical Factors: pt able to don/doff socks EOB with close SBA during reach to ground, min A don shoes with elastic shoe laces, needing assistance for heel. pt reports use of long handled shoe horn at home (not present during session), pt to be provided with long handled shoe horn at end of session for future use  Toileting  Assistance Level: Stand by assist  Toilet Transfers  Technique: Stand step  Equipment: Standard toilet;Grab bars  Additional Factors: Increased time to complete;Verbal cues  Assistance Level: Stand by assist;Contact guard assist  Skilled Clinical Factors: initial CGA increasing to SBA          Functional Mobility  Device: Rolling walker  Activity: To/From therapy gym;To/From bathroom  Assistance Level: Stand by assist;Contact guard assist  Skilled Clinical Factors: amb with RW throughout session;  Transfers  Surface: To chair with arms;From chair with arms  Additional Factors: Verbal cues;Hand placement cues;Increased time to complete;Set-up  Sit to Stand  Assistance Level: Stand by assist  Stand to Sit  Assistance Level: Stand by assist  Stand Pivot  Assistance Level: Stand by assist;Contact guard assist  Skilled Clinical Factors: pt with increased diffiulties managing turns throughout session through door ways and 90 degree stand pivots, no LOB but increased swaying, difficulties managing RW and increased foot shuffling   OT Exercises  Dynamic Standing Balance Exercises: reaching to ground x10 trials with RW, educated on safe form, CGA with focus on pivoting 90 degrees or 360 turn with RW to improve visual scanning, balance, RW management within environment for carryover to IADL. no

## 2024-04-06 NOTE — PLAN OF CARE
Call light and phone within reach  Siderails up 2/4  Bed in lowest position, brakes on  Non-skid socks on  Sitting up in chair, alarm on  Instructed to use call light for assistance, verbalizes an understanding

## 2024-04-07 VITALS
WEIGHT: 180.6 LBS | BODY MASS INDEX: 32 KG/M2 | HEART RATE: 77 BPM | HEIGHT: 63 IN | DIASTOLIC BLOOD PRESSURE: 70 MMHG | RESPIRATION RATE: 18 BRPM | TEMPERATURE: 97.8 F | OXYGEN SATURATION: 94 % | SYSTOLIC BLOOD PRESSURE: 123 MMHG

## 2024-04-07 LAB
GLUCOSE BLD-MCNC: 174 MG/DL (ref 70–99)
GLUCOSE BLD-MCNC: 191 MG/DL (ref 70–99)
GLUCOSE BLD-MCNC: 320 MG/DL (ref 70–99)
GLUCOSE BLD-MCNC: 85 MG/DL (ref 70–99)
PERFORMED ON: ABNORMAL
PERFORMED ON: NORMAL

## 2024-04-07 PROCEDURE — 1280000000 HC REHAB R&B

## 2024-04-07 PROCEDURE — 6370000000 HC RX 637 (ALT 250 FOR IP): Performed by: STUDENT IN AN ORGANIZED HEALTH CARE EDUCATION/TRAINING PROGRAM

## 2024-04-07 PROCEDURE — 94640 AIRWAY INHALATION TREATMENT: CPT

## 2024-04-07 RX ORDER — INSULIN GLARGINE 100 [IU]/ML
20 INJECTION, SOLUTION SUBCUTANEOUS NIGHTLY
Status: DISPENSED | OUTPATIENT
Start: 2024-04-07

## 2024-04-07 RX ADMIN — Medication 400 MG: at 20:58

## 2024-04-07 RX ADMIN — ATORVASTATIN CALCIUM 20 MG: 10 TABLET, FILM COATED ORAL at 09:04

## 2024-04-07 RX ADMIN — BUPROPION HYDROCHLORIDE 300 MG: 150 TABLET, EXTENDED RELEASE ORAL at 09:05

## 2024-04-07 RX ADMIN — APIXABAN 2.5 MG: 2.5 TABLET, FILM COATED ORAL at 09:05

## 2024-04-07 RX ADMIN — METOPROLOL SUCCINATE 25 MG: 25 TABLET, FILM COATED, EXTENDED RELEASE ORAL at 09:05

## 2024-04-07 RX ADMIN — APIXABAN 2.5 MG: 2.5 TABLET, FILM COATED ORAL at 20:58

## 2024-04-07 RX ADMIN — DULOXETINE HYDROCHLORIDE 60 MG: 60 CAPSULE, DELAYED RELEASE ORAL at 09:04

## 2024-04-07 RX ADMIN — LISINOPRIL 5 MG: 5 TABLET ORAL at 09:05

## 2024-04-07 RX ADMIN — Medication 400 MG: at 09:05

## 2024-04-07 RX ADMIN — FUROSEMIDE 20 MG: 20 TABLET ORAL at 09:04

## 2024-04-07 RX ADMIN — INSULIN GLARGINE 20 UNITS: 100 INJECTION, SOLUTION SUBCUTANEOUS at 20:58

## 2024-04-07 RX ADMIN — TIOTROPIUM BROMIDE AND OLODATEROL 2 PUFF: 3.124; 2.736 SPRAY, METERED RESPIRATORY (INHALATION) at 07:19

## 2024-04-07 RX ADMIN — ASPIRIN 81 MG 81 MG: 81 TABLET ORAL at 09:05

## 2024-04-07 RX ADMIN — SPIRONOLACTONE 25 MG: 25 TABLET ORAL at 09:05

## 2024-04-07 RX ADMIN — INSULIN LISPRO 6 UNITS: 100 INJECTION, SOLUTION INTRAVENOUS; SUBCUTANEOUS at 11:47

## 2024-04-07 ASSESSMENT — PAIN SCALES - GENERAL: PAINLEVEL_OUTOF10: 0

## 2024-04-07 NOTE — PROGRESS NOTES
Maria E Valdez  4/7/2024  [id number]    Chief Complaint: Late effects of cerebral ischemic stroke    Subjective:   No acute events overnight. Today Jenn is seen with nursing present. Episode of emesis yesterday. Last bowel movement was yesterday. Concern this morning that distal pulses are difficult to find. Able to be found by nursing with doppler. She reports chronic numbness in her feet from neuropathy.     ROS: denies f/c, n/v, cp, sob    Objective:  Patient Vitals for the past 24 hrs:   BP Temp Temp src Pulse Resp SpO2 Weight   04/07/24 0904 116/69 97.6 °F (36.4 °C) Oral (!) 101 18 95 % --   04/07/24 0719 -- -- -- -- 16 -- --   04/07/24 0239 -- -- -- -- -- -- 81.9 kg (180 lb 9.6 oz)   04/06/24 2015 108/62 97.8 °F (36.6 °C) Oral 92 16 92 % --   04/06/24 1140 -- -- -- -- -- (!) 86 % --     Gen: No distress, pleasant. Seated up in chair  HEENT: Normocephalic, atraumatic.   CV: extremities well perfused  Resp: No respiratory distress. On room air  Abd: Soft, nondistended  Ext: No edema. Pulses via doppler only  Neuro: Alert, oriented, appropriately interactive.   Psych: appropriate mood and affect    Wt Readings from Last 3 Encounters:   04/07/24 81.9 kg (180 lb 9.6 oz)   04/03/24 84.6 kg (186 lb 8 oz)   05/18/19 88.5 kg (195 lb)       Laboratory data:   Lab Results   Component Value Date    WBC 8.7 04/05/2024    HGB 13.6 04/05/2024    HCT 41.6 04/05/2024    MCV 93.0 04/05/2024     04/05/2024       Lab Results   Component Value Date/Time     04/05/2024 08:47 AM    K 4.1 04/05/2024 08:47 AM    K 4.0 04/04/2024 05:47 AM    CL 96 04/05/2024 08:47 AM    CO2 26 04/05/2024 08:47 AM    BUN 19 04/05/2024 08:47 AM    CREATININE 0.9 04/05/2024 08:47 AM    GLUCOSE 210 04/05/2024 08:47 AM    CALCIUM 9.5 04/05/2024 08:47 AM        Therapy progress:  Physical therapy:  Bed Mobility:     Sit>supine:  Assistance Level: Stand by assist  Supine>sit:     Transfers:     Sit>stand:  Assistance Level: Stand by

## 2024-04-07 NOTE — PLAN OF CARE
Problem: Safety - Adult  Goal: Free from fall injury  4/7/2024 1149 by Nubia Bhagat, RN  Outcome: Progressing     Problem: ABCDS Injury Assessment  Goal: Absence of physical injury  Outcome: Progressing     Problem: Nutrition Deficit:  Goal: Optimize nutritional status  Outcome: Progressing

## 2024-04-08 LAB
ANION GAP SERPL CALCULATED.3IONS-SCNC: 11 MMOL/L (ref 3–16)
BACTERIA URNS QL MICRO: ABNORMAL /HPF
BASOPHILS # BLD: 0.1 K/UL (ref 0–0.2)
BASOPHILS NFR BLD: 1 %
BILIRUB UR QL STRIP.AUTO: NEGATIVE
BUN SERPL-MCNC: 24 MG/DL (ref 7–20)
CALCIUM SERPL-MCNC: 9.3 MG/DL (ref 8.3–10.6)
CASTS #/AREA URNS LPF: ABNORMAL /LPF
CASTS #/AREA URNS LPF: ABNORMAL /LPF
CHLORIDE SERPL-SCNC: 96 MMOL/L (ref 99–110)
CLARITY UR: CLEAR
CO2 SERPL-SCNC: 29 MMOL/L (ref 21–32)
COLOR UR: YELLOW
CREAT SERPL-MCNC: 1.1 MG/DL (ref 0.6–1.2)
DEPRECATED RDW RBC AUTO: 16.7 % (ref 12.4–15.4)
EOSINOPHIL # BLD: 0.2 K/UL (ref 0–0.6)
EOSINOPHIL NFR BLD: 3.1 %
EPI CELLS #/AREA URNS HPF: ABNORMAL /HPF (ref 0–5)
GFR SERPLBLD CREATININE-BSD FMLA CKD-EPI: 48 ML/MIN/{1.73_M2}
GLUCOSE BLD-MCNC: 115 MG/DL (ref 70–99)
GLUCOSE BLD-MCNC: 152 MG/DL (ref 70–99)
GLUCOSE BLD-MCNC: 158 MG/DL (ref 70–99)
GLUCOSE BLD-MCNC: 186 MG/DL (ref 70–99)
GLUCOSE BLD-MCNC: 198 MG/DL (ref 70–99)
GLUCOSE BLD-MCNC: 202 MG/DL (ref 70–99)
GLUCOSE SERPL-MCNC: 116 MG/DL (ref 70–99)
GLUCOSE UR STRIP.AUTO-MCNC: NEGATIVE MG/DL
HCT VFR BLD AUTO: 39.3 % (ref 36–48)
HGB BLD-MCNC: 13 G/DL (ref 12–16)
HGB UR QL STRIP.AUTO: NEGATIVE
HYALINE CASTS #/AREA URNS LPF: ABNORMAL /LPF (ref 0–2)
KETONES UR STRIP.AUTO-MCNC: NEGATIVE MG/DL
LEUKOCYTE ESTERASE UR QL STRIP.AUTO: ABNORMAL
LYMPHOCYTES # BLD: 1.1 K/UL (ref 1–5.1)
LYMPHOCYTES NFR BLD: 15 %
MAGNESIUM SERPL-MCNC: 1.8 MG/DL (ref 1.8–2.4)
MCH RBC QN AUTO: 30.7 PG (ref 26–34)
MCHC RBC AUTO-ENTMCNC: 33 G/DL (ref 31–36)
MCV RBC AUTO: 93 FL (ref 80–100)
MONOCYTES # BLD: 0.7 K/UL (ref 0–1.3)
MONOCYTES NFR BLD: 9 %
MUCOUS THREADS #/AREA URNS LPF: ABNORMAL /LPF
NEUTROPHILS # BLD: 5.2 K/UL (ref 1.7–7.7)
NEUTROPHILS NFR BLD: 71.9 %
NITRITE UR QL STRIP.AUTO: NEGATIVE
PERFORMED ON: ABNORMAL
PH UR STRIP.AUTO: 6 [PH] (ref 5–8)
PLATELET # BLD AUTO: 229 K/UL (ref 135–450)
PMV BLD AUTO: 8.2 FL (ref 5–10.5)
POTASSIUM SERPL-SCNC: 4 MMOL/L (ref 3.5–5.1)
PROT UR STRIP.AUTO-MCNC: NEGATIVE MG/DL
RBC # BLD AUTO: 4.23 M/UL (ref 4–5.2)
RBC #/AREA URNS HPF: ABNORMAL /HPF (ref 0–4)
SODIUM SERPL-SCNC: 136 MMOL/L (ref 136–145)
SP GR UR STRIP.AUTO: 1.02 (ref 1–1.03)
UA COMPLETE W REFLEX CULTURE PNL UR: ABNORMAL
UA DIPSTICK W REFLEX MICRO PNL UR: YES
URN SPEC COLLECT METH UR: ABNORMAL
UROBILINOGEN UR STRIP-ACNC: 0.2 E.U./DL
WBC # BLD AUTO: 7.3 K/UL (ref 4–11)
WBC #/AREA URNS HPF: ABNORMAL /HPF (ref 0–5)

## 2024-04-08 PROCEDURE — 97110 THERAPEUTIC EXERCISES: CPT

## 2024-04-08 PROCEDURE — 97530 THERAPEUTIC ACTIVITIES: CPT

## 2024-04-08 PROCEDURE — 6370000000 HC RX 637 (ALT 250 FOR IP): Performed by: STUDENT IN AN ORGANIZED HEALTH CARE EDUCATION/TRAINING PROGRAM

## 2024-04-08 PROCEDURE — 97130 THER IVNTJ EA ADDL 15 MIN: CPT

## 2024-04-08 PROCEDURE — 1280000000 HC REHAB R&B

## 2024-04-08 PROCEDURE — 80048 BASIC METABOLIC PNL TOTAL CA: CPT

## 2024-04-08 PROCEDURE — 36415 COLL VENOUS BLD VENIPUNCTURE: CPT

## 2024-04-08 PROCEDURE — 83735 ASSAY OF MAGNESIUM: CPT

## 2024-04-08 PROCEDURE — 97129 THER IVNTJ 1ST 15 MIN: CPT

## 2024-04-08 PROCEDURE — 97116 GAIT TRAINING THERAPY: CPT

## 2024-04-08 PROCEDURE — 81001 URINALYSIS AUTO W/SCOPE: CPT

## 2024-04-08 PROCEDURE — 92526 ORAL FUNCTION THERAPY: CPT

## 2024-04-08 PROCEDURE — 85025 COMPLETE CBC W/AUTO DIFF WBC: CPT

## 2024-04-08 RX ADMIN — Medication 400 MG: at 07:32

## 2024-04-08 RX ADMIN — ATORVASTATIN CALCIUM 20 MG: 10 TABLET, FILM COATED ORAL at 07:32

## 2024-04-08 RX ADMIN — ASPIRIN 81 MG 81 MG: 81 TABLET ORAL at 07:32

## 2024-04-08 RX ADMIN — SPIRONOLACTONE 25 MG: 25 TABLET ORAL at 07:32

## 2024-04-08 RX ADMIN — Medication 3 MG: at 21:39

## 2024-04-08 RX ADMIN — DULOXETINE HYDROCHLORIDE 60 MG: 60 CAPSULE, DELAYED RELEASE ORAL at 07:32

## 2024-04-08 RX ADMIN — Medication 400 MG: at 21:39

## 2024-04-08 RX ADMIN — APIXABAN 2.5 MG: 2.5 TABLET, FILM COATED ORAL at 21:39

## 2024-04-08 RX ADMIN — APIXABAN 2.5 MG: 2.5 TABLET, FILM COATED ORAL at 07:32

## 2024-04-08 RX ADMIN — LISINOPRIL 5 MG: 5 TABLET ORAL at 07:32

## 2024-04-08 RX ADMIN — BUPROPION HYDROCHLORIDE 300 MG: 150 TABLET, EXTENDED RELEASE ORAL at 07:32

## 2024-04-08 RX ADMIN — INSULIN GLARGINE 20 UNITS: 100 INJECTION, SOLUTION SUBCUTANEOUS at 21:39

## 2024-04-08 RX ADMIN — METOPROLOL SUCCINATE 25 MG: 25 TABLET, FILM COATED, EXTENDED RELEASE ORAL at 07:32

## 2024-04-08 RX ADMIN — FUROSEMIDE 20 MG: 20 TABLET ORAL at 07:32

## 2024-04-08 ASSESSMENT — PAIN SCALES - GENERAL
PAINLEVEL_OUTOF10: 0
PAINLEVEL_OUTOF10: 0

## 2024-04-08 NOTE — PROGRESS NOTES
LOB when amb. if patient d/c home, pt may beneift from home health aide, meals on wheels, cleaning services, to assist with additional ADLs  Safety Devices  Safety Devices in place: Yes  Type of devices: Patient at risk for falls;Call light within reach;Gait belt;All fall risk precautions in place;Nurse notified;Bed alarm in place;Left in bed  Restraints  Initially in place: No    Patient Education  Education  Education Given To: Patient  Education Provided: Role of Therapy;ADL Function;Plan of Care;Mobility Training;Transfer Training;Safety;Fall Prevention Strategies;Precautions;Cognition;Energy Conservation  Education Provided Comments: vbc's for motor planning, purpose of OT, IPR schedule, importance of wearing hearing aids  Education Method: Verbal  Barriers to Learning: Cognition  Education Outcome: Continued education needed;Verbalized understanding    Plan  Occupational Therapy Plan  Times Per Week: 5-7x/week  Current Treatment Recommendations: Strengthening;Balance training;Functional mobility training;Endurance training;Safety education & training;Patient/Caregiver education & training;Equipment evaluation, education, & procurement;Positioning;Self-Care / ADL;Home management training;Cognitive/Perceptual training    Goals  Patient Goals   Patient goals : \"to not fall again\"  Short Term Goals  Time Frame for Short Term Goals: 6 days (4/08/24)- progressing 4/08  Short Term Goal 1: Pt will complete toilet/functional transfer SPV  Short Term Goal 2: Pt will complete LB dressing with SBA and AE  Short Term Goal 3: Pt will tolerate standing at sink to complete 1-2 grooming tasks with SBA  Short Term Goal 4: Pt will complete simple IADL with SBA  Short Term Goal 5: Pt will tolerate x20 BUE ther ex  Long Term Goals  Time Frame for Long Term Goals : 10 days (4/12/24)- progressing 4/08  Long Term Goal 1: Pt will complete toilet/functional transfer mod I  Long Term Goal 2: Pt will complete TB dressing mod I  Long Term

## 2024-04-08 NOTE — PROGRESS NOTES
Physical Therapy  Facility/Department: [facility]  Rehabilitation Physical Therapy Treatment Note    NAME: Maria E Valdez  : 8/3/1934 (89 y.o.)  MRN: [id number]  CODE STATUS: Full Code    Date of Service: 24       Restrictions:  Restrictions/Precautions: General Precautions, Fall Risk, Up as Tolerated, Bed Alarm  Position Activity Restriction  Other position/activity restrictions: fall risk,     SUBJECTIVE  Subjective  Subjective: pt found in recliner, pleasantly confused  Pain: denies pain when asked        OBJECTIVE  Cognition  Overall Cognitive Status: Exceptions  Arousal/Alertness: Appropriate responses to stimuli  Following Commands: Follows one step commands with increased time;Follows one step commands with repetition  Attention Span: Attends with cues to redirect  Memory: Decreased short term memory;Decreased recall of recent events  Safety Judgement: Decreased awareness of need for assistance;Decreased awareness of need for safety  Problem Solving: Assistance required to generate solutions;Assistance required to identify errors made;Assistance required to implement solutions;Decreased awareness of errors  Insights: Decreased awareness of deficits  Initiation: Requires cues for some  Sequencing: Requires cues for some  Cognition Comment: requring max VC and tc for directional commands and initating/ motor planning R/L sides of body  Orientation  Overall Orientation Status: Impaired  Orientation Level: Oriented to person;Oriented to time;Oriented to place;Oriented to situation    Functional Mobility  Sit to Supine  Assistance Level: Modified independent  Supine to Sit  Assistance Level: Modified independent  Balance  Standing Balance: Minimal assistance  Standing Balance  Activity: pt asked to use commode during session. stand pivot from w/c to commode with CGA and grba bar (on 1st attempt, pt standing frmo wc, attempts to walk away from commode stating \" I forgot what I'm doing\". therapist re-oriented

## 2024-04-08 NOTE — PLAN OF CARE
Problem: Safety - Adult  Goal: Free from fall injury  4/7/2024 2132 by Cecily Levi, RN  Outcome: Progressing

## 2024-04-08 NOTE — PROGRESS NOTES
[facility]: ACUTE REHAB UNIT  SPEECH-LANGUAGE PATHOLOGY      [x] Daily  [] Weekly Care Conference Note  [] Discharge    Patient:Maria E Valdez      :8/3/1934  MRN:[id number]  Rehab Dx/Hx: Late effects of cerebral ischemic stroke [I69.30]    Precautions: falls and aspirations  Home situation: Lives alone at [facility] Dx: [] Aphasia  [] Dysarthria  [] Apraxia   [x] Oropharyngeal dysphagia [x] Cognitive Impairment  [] Other:   Date of Admit: 4/3/2024  Room #: 0154/0154-01    Current functional status (updated daily):         Pt being seen for : [] Speech/Language Treatment  [x] Dysphagia Treatment [x] Cognitive Treatment  [] Other:  Communication: [x]WFL  [] Aphasia  [] Dysarthria  [] Apraxia  [] Pragmatic Impairment [] Non-verbal  [] Hearing Loss  [] Other:   Cognition: [] WFL  [] Mild  [x] Moderate  [x] Severe [] Unable to Assess  [] Other:  Memory: [] WFL  [] Mild  [] Moderate  [x] Severe [] Unable to Assess  [] Other:  Behavior: [x] Alert  [x] Cooperative  [x]  Pleasant  [] Confused  [] Agitated  [] Uncooperative  [] Distractible [] Motivated  [] Self-Limiting [] Anxious  [] Other:  Endurance:  [x] Adequate for participation in SLP sessions  [] Reduced overall  [] Lethargic  [] Other:  Safety: [] No concerns at this time  [x] Reduced insight into deficits  [x]  Reduced safety awareness [] Not following call light procedures  [] Unable to Assess  [] Other:    Current Diet Order:ADULT DIET; Regular; 4 carb choices (60 gm/meal)  Swallowing Precautions: Sit up for all meals and thereafter for 30 minutes and Eat with small bites (1/2 tsp; 1 tsp)        Date: 2024      Tx session 1  1000 - 1100 Tx session 2  All tx needs met in session 1   Total Timed Code Min 50    Total Treatment Minutes 60    Individual Treatment Minutes 60    Group Treatment Minutes 0 0   Co-Treat Minutes 0 0   Variance/Reason:  N/A    Pain denies    Pain Intervention [] RN notified  [] Repositioned  [] Intervention offered and

## 2024-04-08 NOTE — PLAN OF CARE
Problem: Safety - Adult  Goal: Free from fall injury  4/8/2024 0804 by Richie Goodson, RN  Outcome: Progressing  4/7/2024 2132 by Cecily Levi, RN  Outcome: Progressing     Problem: Skin/Tissue Integrity  Goal: Absence of new skin breakdown  Description: 1.  Monitor for areas of redness and/or skin breakdown  2.  Assess vascular access sites hourly  3.  Every 4-6 hours minimum:  Change oxygen saturation probe site  4.  Every 4-6 hours:  If on nasal continuous positive airway pressure, respiratory therapy assess nares and determine need for appliance change or resting period.  Outcome: Progressing

## 2024-04-08 NOTE — PROGRESS NOTES
Maria E Valdez  4/8/2024  [id number]    Chief Complaint: Late effects of cerebral ischemic stroke    Subjective:   No acute events overnight. Today Jenn is seen in her room, eating breakfast. She reports feeling well and denies acute complaints at this time. She is hoping to follow up with Dr. Myers on discharge.     ROS: denies f/c, n/v, cp, sob    Objective:  Patient Vitals for the past 24 hrs:   BP Temp Temp src Pulse Resp SpO2 Weight   04/08/24 0817 114/71 -- -- 85 -- 92 % --   04/08/24 0730 136/70 98 °F (36.7 °C) Oral 60 18 94 % --   04/08/24 0532 -- -- -- -- -- -- 80.3 kg (177 lb 0.5 oz)   04/08/24 0316 -- -- -- -- -- -- 85.6 kg (188 lb 11.4 oz)   04/07/24 2045 123/70 97.8 °F (36.6 °C) Oral 77 18 94 % --   04/07/24 0904 116/69 97.6 °F (36.4 °C) Oral (!) 101 18 95 % --     Gen: No distress, pleasant. Seated up in chair  HEENT: Normocephalic, atraumatic.   CV: extremities well perfused  Resp: No respiratory distress. On room air  Abd: Soft, nondistended  Ext: No edema. Pulses via doppler only  Neuro: Alert, oriented, appropriately interactive. Independently eats breakfast  Psych: appropriate mood and affect    Wt Readings from Last 3 Encounters:   04/08/24 80.3 kg (177 lb 0.5 oz)   04/03/24 84.6 kg (186 lb 8 oz)   05/18/19 88.5 kg (195 lb)       Laboratory data:   Lab Results   Component Value Date    WBC 7.3 04/08/2024    HGB 13.0 04/08/2024    HCT 39.3 04/08/2024    MCV 93.0 04/08/2024     04/08/2024       Lab Results   Component Value Date/Time     04/08/2024 06:14 AM    K 4.0 04/08/2024 06:14 AM    K 4.0 04/04/2024 05:47 AM    CL 96 04/08/2024 06:14 AM    CO2 29 04/08/2024 06:14 AM    BUN 24 04/08/2024 06:14 AM    CREATININE 1.1 04/08/2024 06:14 AM    GLUCOSE 116 04/08/2024 06:14 AM    CALCIUM 9.3 04/08/2024 06:14 AM        Therapy progress:  Physical therapy:  Bed Mobility:     Sit>supine:  Assistance Level: Modified independent  Supine>sit:  Assistance Level: Modified independent  Transfers:

## 2024-04-08 NOTE — CARE COORDINATION
Case Management/Follow up:   Chart reviewed for ARU. IP day #: 5  Discharge date: TBD  Therapy recommendations: 24 hour supervision or assist;Home with Home health   DME needed: TBD      Independent walker level. Lives alone at [facility]. Patient owns walker and cane. Plans to return to [facility] with [facility].    Citlali Thompson RN

## 2024-04-09 ENCOUNTER — APPOINTMENT (OUTPATIENT)
Dept: GENERAL RADIOLOGY | Age: 89
DRG: 057 | End: 2024-04-09
Attending: STUDENT IN AN ORGANIZED HEALTH CARE EDUCATION/TRAINING PROGRAM
Payer: MEDICARE

## 2024-04-09 LAB
ANION GAP SERPL CALCULATED.3IONS-SCNC: 9 MMOL/L (ref 3–16)
BUN SERPL-MCNC: 22 MG/DL (ref 7–20)
CALCIUM SERPL-MCNC: 9.4 MG/DL (ref 8.3–10.6)
CHLORIDE SERPL-SCNC: 97 MMOL/L (ref 99–110)
CO2 SERPL-SCNC: 29 MMOL/L (ref 21–32)
CREAT SERPL-MCNC: 1 MG/DL (ref 0.6–1.2)
GFR SERPLBLD CREATININE-BSD FMLA CKD-EPI: 54 ML/MIN/{1.73_M2}
GLUCOSE BLD-MCNC: 105 MG/DL (ref 70–99)
GLUCOSE BLD-MCNC: 139 MG/DL (ref 70–99)
GLUCOSE BLD-MCNC: 153 MG/DL (ref 70–99)
GLUCOSE BLD-MCNC: 171 MG/DL (ref 70–99)
GLUCOSE SERPL-MCNC: 106 MG/DL (ref 70–99)
PERFORMED ON: ABNORMAL
POTASSIUM SERPL-SCNC: 4.1 MMOL/L (ref 3.5–5.1)
SODIUM SERPL-SCNC: 135 MMOL/L (ref 136–145)

## 2024-04-09 PROCEDURE — 92611 MOTION FLUOROSCOPY/SWALLOW: CPT

## 2024-04-09 PROCEDURE — 97535 SELF CARE MNGMENT TRAINING: CPT

## 2024-04-09 PROCEDURE — 6370000000 HC RX 637 (ALT 250 FOR IP): Performed by: STUDENT IN AN ORGANIZED HEALTH CARE EDUCATION/TRAINING PROGRAM

## 2024-04-09 PROCEDURE — 80048 BASIC METABOLIC PNL TOTAL CA: CPT

## 2024-04-09 PROCEDURE — 36415 COLL VENOUS BLD VENIPUNCTURE: CPT

## 2024-04-09 PROCEDURE — 97110 THERAPEUTIC EXERCISES: CPT

## 2024-04-09 PROCEDURE — 97530 THERAPEUTIC ACTIVITIES: CPT

## 2024-04-09 PROCEDURE — 97116 GAIT TRAINING THERAPY: CPT

## 2024-04-09 PROCEDURE — 74230 X-RAY XM SWLNG FUNCJ C+: CPT

## 2024-04-09 PROCEDURE — 1280000000 HC REHAB R&B

## 2024-04-09 PROCEDURE — 92526 ORAL FUNCTION THERAPY: CPT

## 2024-04-09 RX ADMIN — LISINOPRIL 5 MG: 5 TABLET ORAL at 09:03

## 2024-04-09 RX ADMIN — Medication 400 MG: at 20:03

## 2024-04-09 RX ADMIN — DULOXETINE HYDROCHLORIDE 60 MG: 60 CAPSULE, DELAYED RELEASE ORAL at 09:03

## 2024-04-09 RX ADMIN — Medication 400 MG: at 09:03

## 2024-04-09 RX ADMIN — FUROSEMIDE 20 MG: 20 TABLET ORAL at 09:03

## 2024-04-09 RX ADMIN — APIXABAN 2.5 MG: 2.5 TABLET, FILM COATED ORAL at 09:04

## 2024-04-09 RX ADMIN — BUPROPION HYDROCHLORIDE 300 MG: 150 TABLET, EXTENDED RELEASE ORAL at 09:04

## 2024-04-09 RX ADMIN — Medication 3 MG: at 20:03

## 2024-04-09 RX ADMIN — INSULIN GLARGINE 20 UNITS: 100 INJECTION, SOLUTION SUBCUTANEOUS at 20:03

## 2024-04-09 RX ADMIN — APIXABAN 2.5 MG: 2.5 TABLET, FILM COATED ORAL at 20:03

## 2024-04-09 RX ADMIN — ATORVASTATIN CALCIUM 20 MG: 10 TABLET, FILM COATED ORAL at 09:04

## 2024-04-09 RX ADMIN — ASPIRIN 81 MG 81 MG: 81 TABLET ORAL at 09:04

## 2024-04-09 RX ADMIN — SPIRONOLACTONE 25 MG: 25 TABLET ORAL at 09:04

## 2024-04-09 RX ADMIN — METOPROLOL SUCCINATE 25 MG: 25 TABLET, FILM COATED, EXTENDED RELEASE ORAL at 09:04

## 2024-04-09 ASSESSMENT — PAIN SCALES - GENERAL
PAINLEVEL_OUTOF10: 0
PAINLEVEL_OUTOF10: 0

## 2024-04-09 NOTE — PROGRESS NOTES
Extremity Bathing  Assistance Level: Stand by assist  Skilled Clinical Factors: seated on TTB to wash BLE, standing to wash/dry buttocks  Upper Extremity Dressing  Assistance Level: Increased time to complete;Minimal assistance  Skilled Clinical Factors: assist to clasp/unclasp bra, pt able to don/doff pullover larry t  Lower Extremity Dressing  Assistance Level: Stand by assist;Increased time to complete  Skilled Clinical Factors: seated to thread BLE into pants, SBA to don/doff over hips with use of grab bars for balance  Putting On/Taking Off Footwear  Assistance Level: Stand by assist;Increased time to complete  Skilled Clinical Factors: doff bilat socks and shoes while seated with inc time, don bilat socks with inc time. Will provide sock aid to inc safety when bending over  Tub/Shower Transfers  Type: Shower  Transfer From: Walker  Transfer To: Tub transfer bench  Additional Factors: Increased time to complete;Cues for hand placement;Verbal cues  Assistance Level: Stand by assist  Skilled Clinical Factors: RW <> TTB with use of grab bars, vbc's to turn around safely prior to sitting          Functional Mobility  Device: Rolling walker  Activity: To/From bathroom  Assistance Level: Stand by assist  Skilled Clinical Factors: amb with RW throughout session; 3-4 posterior leans with CGA to correct LOB  Transfers  Surface: To chair with arms;From chair with arms  Additional Factors: Verbal cues;Hand placement cues;Increased time to complete;Set-up  Device: Walker  Sit to Stand  Assistance Level: Contact guard assist  Skilled Clinical Factors: multiple throughout session, posterior lean when standing, CGA to support balance  Stand to Sit  Skilled Clinical Factors: multiple throughout session   OT Exercises  A/AROM Exercises: x20 BUE ex with 2# free weight: elbow flex/ext, chest press, shoulder flex, forearm rotation, wrist flex/ext. Mod vb and demo cues to complete ex     Assessment  Assessment  Assessment: Pt

## 2024-04-09 NOTE — PROGRESS NOTES
[facility]: ACUTE REHAB UNIT  SPEECH-LANGUAGE PATHOLOGY      [x] Daily  [] Weekly Care Conference Note  [] Discharge    Patient:Maria E Valdez      :8/3/1934  MRN:[id number]  Rehab Dx/Hx: Late effects of cerebral ischemic stroke [I69.30]    Precautions: falls and aspirations  Home situation: Lives alone at [facility] Dx: [] Aphasia  [] Dysarthria  [] Apraxia   [x] Oropharyngeal dysphagia [x] Cognitive Impairment  [] Other:   Date of Admit: 4/3/2024  Room #: 0154/0154-01    Current functional status (updated daily):         Pt being seen for : [] Speech/Language Treatment  [x] Dysphagia Treatment [x] Cognitive Treatment  [] Other:  Communication: [x]WFL  [] Aphasia  [] Dysarthria  [] Apraxia  [] Pragmatic Impairment [] Non-verbal  [] Hearing Loss  [] Other:   Cognition: [] WFL  [] Mild  [x] Moderate  [x] Severe [] Unable to Assess  [] Other:  Memory: [] WFL  [] Mild  [] Moderate  [x] Severe [] Unable to Assess  [] Other:  Behavior: [x] Alert  [x] Cooperative  [x]  Pleasant  [] Confused  [] Agitated  [] Uncooperative  [] Distractible [] Motivated  [] Self-Limiting [] Anxious  [] Other:  Endurance:  [x] Adequate for participation in SLP sessions  [] Reduced overall  [] Lethargic  [] Other:  Safety: [] No concerns at this time  [x] Reduced insight into deficits  [x]  Reduced safety awareness [] Not following call light procedures  [] Unable to Assess  [] Other:    Current Diet Order:ADULT DIET; Regular; 4 carb choices (60 gm/meal)  Swallowing Precautions: Sit up for all meals and thereafter for 30 minutes and Eat with small bites (1/2 tsp; 1 tsp)        Date: 2024      Tx session 1  1030 - 1130 Tx session 2  All tx needs met in session 1   Total Timed Code Min 0    Total Treatment Minutes 60    Individual Treatment Minutes 60    Group Treatment Minutes 0 0   Co-Treat Minutes 0 0   Variance/Reason:  N/A    Pain denies    Pain Intervention [] RN notified  [] Repositioned  [] Intervention offered and

## 2024-04-09 NOTE — PROGRESS NOTES
Maria E Valdez  4/9/2024  [id number]    Chief Complaint: Late effects of cerebral ischemic stroke    Subjective:   Yesterday patient with poor safety awareness. UA obtained and negative for infection. No acute events overnight. Today Jenn is seen in her room. She reports that she is feeling well. She denies acute complaints at this time.     ROS: denies f/c, n/v, cp, sob    Objective:  Patient Vitals for the past 24 hrs:   BP Temp Temp src Pulse Resp SpO2 Weight   04/09/24 0900 128/89 97.4 °F (36.3 °C) Oral 86 18 92 % --   04/09/24 0813 138/66 -- -- 80 -- 94 % --   04/09/24 0630 -- -- -- -- -- -- 81.6 kg (179 lb 12.8 oz)   04/09/24 0235 -- -- -- -- -- -- 83.7 kg (184 lb 8.4 oz)   04/08/24 2115 -- 97.8 °F (36.6 °C) Oral 76 18 95 % --     Gen: No distress, pleasant. Seated up in bed  HEENT: Normocephalic, atraumatic.   CV: RRR  Resp: No respiratory distress. On room air  Abd: Soft, nondistended  Ext: No edema. Pulses via doppler only  Neuro: Alert, oriented, appropriately interactive  Psych: appropriate mood and affect    Wt Readings from Last 3 Encounters:   04/09/24 81.6 kg (179 lb 12.8 oz)   04/03/24 84.6 kg (186 lb 8 oz)   05/18/19 88.5 kg (195 lb)       Laboratory data:   Lab Results   Component Value Date    WBC 7.3 04/08/2024    HGB 13.0 04/08/2024    HCT 39.3 04/08/2024    MCV 93.0 04/08/2024     04/08/2024       Lab Results   Component Value Date/Time     04/09/2024 06:53 AM    K 4.1 04/09/2024 06:53 AM    CL 97 04/09/2024 06:53 AM    CO2 29 04/09/2024 06:53 AM    BUN 22 04/09/2024 06:53 AM    CREATININE 1.0 04/09/2024 06:53 AM    GLUCOSE 106 04/09/2024 06:53 AM    CALCIUM 9.4 04/09/2024 06:53 AM        Therapy progress:  Physical therapy:  Bed Mobility:     Sit>supine:  Assistance Level: Modified independent  Supine>sit:  Assistance Level: Modified independent  Transfers:     Sit>stand:  Assistance Level: Contact guard assist  Skilled Clinical Factors: from EOb, commode and wc to

## 2024-04-09 NOTE — PATIENT CARE CONFERENCE
[facility]  Inpatient Rehabilitation  Weekly Team Conference Note    Date: 4/10/2024  Patient Name: Maria E Valdez        MRN: [id number]    : 8/3/1934  (89 y.o.)  Gender: female   Referring Practitioner: Charlotte Serrano MD         Interventions to be utilized toward barriers to discharge, per discipline:  NURSING  Nursing observed barriers to dc: Cognitive deficit, Decreased endurance, Upper extremity weakness, Lower extremity weakness, Incontinence of bowel, Incontinence of bladder, and Medication managment  Nursing interventions: Assist with ADLs, incontinence management, diabetic and medication management, maintain safety.   Family Education: No  Fall Risk:  Yes    Stay with me?: Yes    PHYSICAL THERAPY  Physical therapy observed barriers to dc:    Baseline: lives alone in [place]. Reports being mod ind with mobility with RW and ind with ADLs. Unsure what assist she received at baseline    Current level: mod ind bed mobility, CGA transfers, CGA-min a gait up to 100 ft, CG-min a steps    Barriers to DC: balance, severely impaired cog/insight    Needs in order to achieve dc home/next level of care: due to impaired cog / safety, rec home with  and HHPT. DME: none, owns RW      Physical therapy interventions:   Current Treatment Recommendations: Strengthening, Balance training, Functional mobility training, Transfer training, Gait training, Endurance training, Safety education & training, Therapeutic activities, Neuromuscular re-education, Equipment evaluation, education, & procurement, Home exercise program, Cognitive reorientation, Stair training    PT Goals:            Short Term Goals  Time Frame for Short Term Goals: 6 days (24)  Short Term Goal 1: pt will complete bed mobility with mod-ind- GOAL MET mod ind  Short Term Goal 2: pt will perform transfers with LRAD and supv- GOAL NOT MET< CGA  Short Term Goal 3: pt will ambulate 100 ft with LRAD and SBA- GOAL NOT MET< CGA-min

## 2024-04-09 NOTE — PROGRESS NOTES
Physical Therapy  Facility/Department: [facility]  Rehabilitation Physical Therapy Treatment Note    NAME: Maria E Valdez  : 8/3/1934 (89 y.o.)  MRN: [id number]  CODE STATUS: Full Code    Date of Service: 24       Restrictions:  Restrictions/Precautions: General Precautions, Fall Risk, Up as Tolerated, Bed Alarm  Position Activity Restriction  Other position/activity restrictions: fall risk,     SUBJECTIVE  Subjective  Subjective: pt found in bed, requesting to use commode and dress prior to going to gym  Pain: denies pain       OBJECTIVE  Cognition  Overall Cognitive Status: Exceptions  Arousal/Alertness: Appropriate responses to stimuli  Following Commands: Follows one step commands with increased time;Follows one step commands with repetition  Attention Span: Attends with cues to redirect  Memory: Decreased short term memory;Decreased recall of recent events  Safety Judgement: Decreased awareness of need for assistance;Decreased awareness of need for safety  Problem Solving: Assistance required to generate solutions;Assistance required to identify errors made;Assistance required to implement solutions;Decreased awareness of errors  Insights: Decreased awareness of deficits  Initiation: Requires cues for some  Sequencing: Requires cues for some  Cognition Comment: requring max VC and tc for directional commands and initating/ motor planning R/L sides of body  Orientation  Overall Orientation Status: Impaired  Orientation Level: Oriented to person;Oriented to time;Oriented to place;Oriented to situation    Functional Mobility  Supine to Sit  Assistance Level: Modified independent  Scooting  Assistance Level: Modified independent  Balance  Standing Balance: Contact guard assistance  Standing Balance  Activity: CGA while pt completed dressing / pericare task before and after commode use with 1-2 UE support on RW. pt requesting to dress, supv -ind for sitting balance while changing into clean shirt and CGA for

## 2024-04-09 NOTE — PROCEDURES
Speech Language Pathology  Modified Barium Swallow Study  Facility/Department: [facility]        Recommendations:  Diet recommendation: IDDSI 7 Regular Solids; IDDSI 0 Thin Liquids; Meds PO as tolerated (whole with thins or whole with puree)  Risk management: upright for all intake, stay upright for at least 30 mins after intake, small bites/sips, downgrade to mildly thick if s/s of aspiration develop, close supervision, oral care 2-3x/day to reduce adverse affects in the event of aspiration, increase physical mobility as able, alternate bites/sips, slow rate of intake, general GERD precautions, and general aspiration precautions      NAME:Maria E Valdez  : 8/3/1934 (89 y.o.)   MRN: [id number]  ROOM: [address] [place] [address]  ADMISSION DATE: 4/3/2024  PATIENT DIAGNOSIS(ES): Late effects of cerebral ischemic stroke [I69.30]  No chief complaint on file.    Patient Active Problem List    Diagnosis Date Noted    Head injury 2024    Anticoagulated by anticoagulation treatment 2024    Fall 2024    Cerebrovascular accident (CVA) ([facility]) 2024    Late effects of cerebral ischemic stroke 2024    Stroke-like symptoms 2024    Acute on chronic combined systolic and diastolic CHF (congestive heart failure) ([facility]) 2024    Permanent atrial fibrillation ([facility]) 2024    Hypercholesterolemia 2024    Dizziness 2024    Elevated troponin 2024    Frequent falls 2024    Acute idiopathic gout of left foot 2016    Anxiety 2016    Restless legs syndrome 11/10/2015    Strain of tibialis anterior muscle 2015    Lumbar strain 2015    Hypertension 2015    Elevated lipids 2015    Controlled type 2 diabetes mellitus with hyperglycemia, with long-term current use of insulin ([facility]) 2015    Metatarsalgia 10/03/2014    Dermatophytosis of nail 10/22/2013    Diabetic polyneuropathy ([facility]) 10/22/2013    Type 2 diabetes mellitus with diabetic

## 2024-04-09 NOTE — PLAN OF CARE
Problem: Safety - Adult  Goal: Free from fall injury  4/9/2024 0745 by Richie Goodson, RN  Outcome: Progressing  4/8/2024 5725 by Isis Espinosa, RN  Outcome: Progressing     Problem: Skin/Tissue Integrity  Goal: Absence of new skin breakdown  Description: 1.  Monitor for areas of redness and/or skin breakdown  2.  Assess vascular access sites hourly  3.  Every 4-6 hours minimum:  Change oxygen saturation probe site  4.  Every 4-6 hours:  If on nasal continuous positive airway pressure, respiratory therapy assess nares and determine need for appliance change or resting period.  Outcome: Progressing

## 2024-04-10 ENCOUNTER — APPOINTMENT (OUTPATIENT)
Dept: CT IMAGING | Age: 89
DRG: 057 | End: 2024-04-10
Attending: STUDENT IN AN ORGANIZED HEALTH CARE EDUCATION/TRAINING PROGRAM
Payer: MEDICARE

## 2024-04-10 LAB
ANION GAP SERPL CALCULATED.3IONS-SCNC: 10 MMOL/L (ref 3–16)
BASOPHILS # BLD: 0.1 K/UL (ref 0–0.2)
BASOPHILS NFR BLD: 0.6 %
BUN SERPL-MCNC: 21 MG/DL (ref 7–20)
CALCIUM SERPL-MCNC: 9.5 MG/DL (ref 8.3–10.6)
CHLORIDE SERPL-SCNC: 95 MMOL/L (ref 99–110)
CO2 SERPL-SCNC: 31 MMOL/L (ref 21–32)
CREAT SERPL-MCNC: 1.2 MG/DL (ref 0.6–1.2)
DEPRECATED RDW RBC AUTO: 17.1 % (ref 12.4–15.4)
EOSINOPHIL # BLD: 0.2 K/UL (ref 0–0.6)
EOSINOPHIL NFR BLD: 2 %
GFR SERPLBLD CREATININE-BSD FMLA CKD-EPI: 43 ML/MIN/{1.73_M2}
GLUCOSE BLD-MCNC: 105 MG/DL (ref 70–99)
GLUCOSE BLD-MCNC: 72 MG/DL (ref 70–99)
GLUCOSE BLD-MCNC: 72 MG/DL (ref 70–99)
GLUCOSE BLD-MCNC: 84 MG/DL (ref 70–99)
GLUCOSE SERPL-MCNC: 70 MG/DL (ref 70–99)
HCT VFR BLD AUTO: 40.3 % (ref 36–48)
HGB BLD-MCNC: 13.4 G/DL (ref 12–16)
LYMPHOCYTES # BLD: 1 K/UL (ref 1–5.1)
LYMPHOCYTES NFR BLD: 12 %
MAGNESIUM SERPL-MCNC: 2.3 MG/DL (ref 1.8–2.4)
MCH RBC QN AUTO: 30.7 PG (ref 26–34)
MCHC RBC AUTO-ENTMCNC: 33.2 G/DL (ref 31–36)
MCV RBC AUTO: 92.3 FL (ref 80–100)
MONOCYTES # BLD: 0.8 K/UL (ref 0–1.3)
MONOCYTES NFR BLD: 9.8 %
NEUTROPHILS # BLD: 6.3 K/UL (ref 1.7–7.7)
NEUTROPHILS NFR BLD: 75.6 %
PERFORMED ON: ABNORMAL
PERFORMED ON: NORMAL
PLATELET # BLD AUTO: 257 K/UL (ref 135–450)
PMV BLD AUTO: 8 FL (ref 5–10.5)
POTASSIUM SERPL-SCNC: 4.1 MMOL/L (ref 3.5–5.1)
RBC # BLD AUTO: 4.37 M/UL (ref 4–5.2)
SODIUM SERPL-SCNC: 136 MMOL/L (ref 136–145)
WBC # BLD AUTO: 8.4 K/UL (ref 4–11)

## 2024-04-10 PROCEDURE — 97535 SELF CARE MNGMENT TRAINING: CPT

## 2024-04-10 PROCEDURE — 97116 GAIT TRAINING THERAPY: CPT

## 2024-04-10 PROCEDURE — 1280000000 HC REHAB R&B

## 2024-04-10 PROCEDURE — 97130 THER IVNTJ EA ADDL 15 MIN: CPT

## 2024-04-10 PROCEDURE — 36415 COLL VENOUS BLD VENIPUNCTURE: CPT

## 2024-04-10 PROCEDURE — 80048 BASIC METABOLIC PNL TOTAL CA: CPT

## 2024-04-10 PROCEDURE — 94640 AIRWAY INHALATION TREATMENT: CPT

## 2024-04-10 PROCEDURE — 97110 THERAPEUTIC EXERCISES: CPT

## 2024-04-10 PROCEDURE — 97530 THERAPEUTIC ACTIVITIES: CPT

## 2024-04-10 PROCEDURE — 83735 ASSAY OF MAGNESIUM: CPT

## 2024-04-10 PROCEDURE — 85025 COMPLETE CBC W/AUTO DIFF WBC: CPT

## 2024-04-10 PROCEDURE — 97129 THER IVNTJ 1ST 15 MIN: CPT

## 2024-04-10 PROCEDURE — 70450 CT HEAD/BRAIN W/O DYE: CPT

## 2024-04-10 PROCEDURE — 6370000000 HC RX 637 (ALT 250 FOR IP): Performed by: STUDENT IN AN ORGANIZED HEALTH CARE EDUCATION/TRAINING PROGRAM

## 2024-04-10 RX ORDER — INSULIN GLARGINE 100 [IU]/ML
15 INJECTION, SOLUTION SUBCUTANEOUS NIGHTLY
Status: DISCONTINUED | OUTPATIENT
Start: 2024-04-10 | End: 2024-04-11

## 2024-04-10 RX ORDER — LANOLIN ALCOHOL/MO/W.PET/CERES
400 CREAM (GRAM) TOPICAL DAILY
Status: DISCONTINUED | OUTPATIENT
Start: 2024-04-10 | End: 2024-04-16 | Stop reason: HOSPADM

## 2024-04-10 RX ADMIN — METOPROLOL SUCCINATE 25 MG: 25 TABLET, FILM COATED, EXTENDED RELEASE ORAL at 09:10

## 2024-04-10 RX ADMIN — Medication 400 MG: at 09:10

## 2024-04-10 RX ADMIN — DULOXETINE HYDROCHLORIDE 60 MG: 60 CAPSULE, DELAYED RELEASE ORAL at 09:10

## 2024-04-10 RX ADMIN — BUPROPION HYDROCHLORIDE 300 MG: 150 TABLET, EXTENDED RELEASE ORAL at 09:10

## 2024-04-10 RX ADMIN — TIOTROPIUM BROMIDE AND OLODATEROL 2 PUFF: 3.124; 2.736 SPRAY, METERED RESPIRATORY (INHALATION) at 08:53

## 2024-04-10 RX ADMIN — ATORVASTATIN CALCIUM 20 MG: 10 TABLET, FILM COATED ORAL at 09:09

## 2024-04-10 RX ADMIN — APIXABAN 2.5 MG: 2.5 TABLET, FILM COATED ORAL at 20:13

## 2024-04-10 RX ADMIN — FUROSEMIDE 20 MG: 20 TABLET ORAL at 09:10

## 2024-04-10 RX ADMIN — APIXABAN 2.5 MG: 2.5 TABLET, FILM COATED ORAL at 09:10

## 2024-04-10 RX ADMIN — SPIRONOLACTONE 25 MG: 25 TABLET ORAL at 09:10

## 2024-04-10 RX ADMIN — INSULIN GLARGINE 15 UNITS: 100 INJECTION, SOLUTION SUBCUTANEOUS at 20:13

## 2024-04-10 RX ADMIN — LISINOPRIL 5 MG: 5 TABLET ORAL at 09:10

## 2024-04-10 RX ADMIN — ASPIRIN 81 MG 81 MG: 81 TABLET ORAL at 09:10

## 2024-04-10 NOTE — PROGRESS NOTES
[facility]: ACUTE REHAB UNIT  SPEECH-LANGUAGE PATHOLOGY      [x] Daily  [] Weekly Care Conference Note  [] Discharge    Patient:Maria E Valdez      :8/3/1934  MRN:[id number]  Rehab Dx/Hx: Late effects of cerebral ischemic stroke [I69.30]    Precautions: falls and aspirations  Home situation: Lives alone at [facility] Dx: [] Aphasia  [] Dysarthria  [] Apraxia   [x] Oropharyngeal dysphagia [x] Cognitive Impairment  [] Other:   Date of Admit: 4/3/2024  Room #: 0154/0154-01    Current functional status (updated daily):         Pt being seen for : [] Speech/Language Treatment  [x] Dysphagia Treatment [x] Cognitive Treatment  [] Other:  Communication: [x]WFL  [] Aphasia  [] Dysarthria  [] Apraxia  [] Pragmatic Impairment [] Non-verbal  [] Hearing Loss  [] Other:   Cognition: [] WFL  [] Mild  [x] Moderate  [x] Severe [] Unable to Assess  [] Other:  Memory: [] WFL  [] Mild  [] Moderate  [x] Severe [] Unable to Assess  [] Other:  Behavior: [x] Alert  [x] Cooperative  [x]  Pleasant  [] Confused  [] Agitated  [] Uncooperative  [] Distractible [] Motivated  [] Self-Limiting [] Anxious  [] Other:  Endurance:  [x] Adequate for participation in SLP sessions  [] Reduced overall  [] Lethargic  [] Other:  Safety: [] No concerns at this time  [x] Reduced insight into deficits  [x]  Reduced safety awareness [] Not following call light procedures  [] Unable to Assess  [] Other:    Current Diet Order:ADULT DIET; Regular; 4 carb choices (60 gm/meal)  Swallowing Precautions: Sit up for all meals and thereafter for 30 minutes and Eat with small bites (1/2 tsp; 1 tsp)        Date: 4/10/2024      Tx session 1  1230 - 1330 Tx session 2  All tx needs met in session 1   Total Timed Code Min 60    Total Treatment Minutes 60    Individual Treatment Minutes 60    Group Treatment Minutes 0 0   Co-Treat Minutes 0 0   Variance/Reason:  N/A    Pain denies    Pain Intervention [] RN notified  [] Repositioned  [] Intervention offered and

## 2024-04-10 NOTE — PROGRESS NOTES
Factors: stand pvito from recliner to wc wtih RW and min-mod a, difficulty motor planning turning, short shuffled steps and multiple posterior LOB with no righting reactions.      Environmental Mobility  Ambulation  Surface: Level surface  Device: Rolling walker  Distance: 135 ft x 2 reps+150 ft   Activity Comments: pt initially declines gait training, stating \" I don't feel ready to walk\". denies dizziness  Assistance Level: Contact guard assist;Minimal assistance  Gait Deviations: Narrow base of support;Decreased step length bilateral;Decreased trunk rotation;Decreased weight shift bilateral;Unsteady gait  Skilled Clinical Factors: difficulty turning through doorways, stuttered steps and lack of consistent dione.  Wheelchair  Surface: Level surface  Device: Standard wheelchair  Assistance Level for Propulsion: Stand by assist  Propulsion Method: Bilateral lower extremities;Bilateral upper extremities  Propulsion Quality: Slow velocity;Short strokes;Decreased fluidity  Propulsion Distance: 130 ft  Skilled Clinical Factors: difficulty sequencing BUE and BLE for effective propulsion, requires cues for sequencing to initiate and motor plan turns             PT Exercises  Exercise Treatment: pt dons 2# weights BLe, pt completed 20 reps of BLE seated ankle pumps, LAQ, marches with cues for sequencing/technique  Dynamic Standing Balance Exercises: dyn stand activity: with shoulder width THIERNO, pt reaches LUE for cone on table on [place] side-> places to targets in various planes across midline x 6 reps, grossly CGA_min a with occasional posterior LOb with delayed stepping reactions. following seated rest, pt repeated activity with RUE reaching x 6 reps, 1-2 UE support on RW. poor eccentric control upon sitting.      ASSESSMENT/PROGRESS TOWARDS GOALS  Vital Signs  Pulse: 82  Heart Rate Source: Monitor  BP: 126/66  BP Location: Left upper arm  MAP (Calculated): 86  SpO2: 93 %  O2 Device: None (Room

## 2024-04-10 NOTE — PROGRESS NOTES
of bed raised;With handrails  Supine to Sit  Assistance Level: Minimal assistance  Skilled Clinical Factors: assist with trunk to L side of bed, min A for sitting balance  Transfers  Surface: From bed;To chair with arms;From chair with arms;Standard toilet  Additional Factors: Verbal cues;Hand placement cues;Increased time to complete;Set-up  Device: Walker  Sit to Stand  Assistance Level: Contact guard assist  Skilled Clinical Factors: multiple throughout session; posterior lean when initially standing, CGA to support balance  Stand to Sit  Assistance Level: Stand by assist  Skilled Clinical Factors: multiple throughout session; demo's reaching back to surface         Assessment  Assessment  Assessment: Pt limited by decreased cognition. Oriented x self only. Pt required min A for bed mobility to achieve upright position and poor sitting balance. Pt SBA for toileting, SBA for LB dressing, and min A for UB dressing. Pt required mod vb and visual cues to initiate and sequence ADL tasks, attempting to put shoes on while sock aid was still on R foot. Pt with posterior leans when standing, CGA to assist with sitting balance. Pt reported inc fatigue throughout session. Continue OT POC.  Activity Tolerance: Treatment limited secondary to decreased cognition;Patient limited by fatigue  Discharge Recommendations: 24 hour supervision or assist;Continue to assess pending progress;Home with Home health OT  Factors Affecting Discharge: Pt will require strict 24/7 SPV d/t decreased safety awareness and LOB when amb. if patient d/c home, pt may beneift from home health aide, meals on wheels, cleaning services, to assist with additional ADLs  OT Equipment Recommendations  ADL Assistive Devices: Shower Chair with back  Safety Devices  Safety Devices in place: Yes  Type of devices: Patient at risk for falls;Call light within reach;Gait belt;All fall risk precautions in place;Nurse notified;Left in chair;Chair alarm in

## 2024-04-10 NOTE — PROGRESS NOTES
Maria E Valdez  4/10/2024  [id number]    Chief Complaint: Late effects of cerebral ischemic stroke    Subjective:   No acute events overnight. Today Jenn is seen in her room with OT. She reports having a bad day with therapy yesterday. UA was negative. She reports feeling improved today.     ROS: denies f/c, n/v, cp, sob    Objective:  Patient Vitals for the past 24 hrs:   BP Temp Temp src Pulse Resp SpO2 Weight   04/10/24 1017 126/66 -- -- 82 -- 93 % --   04/10/24 0910 125/63 97.5 °F (36.4 °C) Oral 85 18 96 % --   04/10/24 0826 135/86 97.7 °F (36.5 °C) -- 89 -- 97 % --   04/10/24 0600 -- -- -- -- -- -- 82.4 kg (181 lb 11.2 oz)   04/09/24 2003 129/85 97.4 °F (36.3 °C) Oral 80 16 95 % --     Gen: No distress, pleasant.  HEENT: Normocephalic, atraumatic.   CV: extremities well perfused  Resp: No respiratory distress. On room air  Abd: Soft, nondistended  Ext: No edema.   Neuro: Alert, oriented, appropriately interactive  Psych: appropriate mood and affect    Wt Readings from Last 3 Encounters:   04/10/24 82.4 kg (181 lb 11.2 oz)   04/03/24 84.6 kg (186 lb 8 oz)   05/18/19 88.5 kg (195 lb)       Laboratory data:   Lab Results   Component Value Date    WBC 8.4 04/10/2024    HGB 13.4 04/10/2024    HCT 40.3 04/10/2024    MCV 92.3 04/10/2024     04/10/2024       Lab Results   Component Value Date/Time     04/10/2024 06:06 AM    K 4.1 04/10/2024 06:06 AM    K 4.1 04/09/2024 06:53 AM    CL 95 04/10/2024 06:06 AM    CO2 31 04/10/2024 06:06 AM    BUN 21 04/10/2024 06:06 AM    CREATININE 1.2 04/10/2024 06:06 AM    GLUCOSE 70 04/10/2024 06:06 AM    CALCIUM 9.5 04/10/2024 06:06 AM        Therapy progress:  Physical therapy:  Bed Mobility:     Sit>supine:  Assistance Level: Modified independent  Supine>sit:  Assistance Level: Modified independent  Transfers:     Sit>stand:  Assistance Level: Contact guard assist  Skilled Clinical Factors: from recliner and wc to RW with max VC /multiple attempts to be

## 2024-04-10 NOTE — PROGRESS NOTES
Comprehensive Nutrition Assessment    Type and Reason for Visit:  Reassess    Nutrition Recommendations/Plan:   Continue CC4 diet and encourage po intakes  Add glucerna at dinner - chocolate  Monitor nutrition adequacy, pertinent labs, bowel habits, wt changes, and clinical progress     Malnutrition Assessment:  Malnutrition Status:  At risk for malnutrition (Comment) (04/10/24 9611)    Context:  Acute Illness     Findings of the 6 clinical characteristics of malnutrition:  Energy Intake:  Mild decrease in energy intake (Comment)    Nutrition Assessment:    Follow up: Pt continues on CC4 diet, po intakes 1-100%, majority 26-75% per EMR. Weight fluctuating between 176-188 lbs since admit. Pt reports not having much of an appetite for lunch, did not want to eat at time of visit. Reports eating 3/4 of breakfast today and otherwise having better intakes. Agreeable to trying ONS tonight with dinner. RD offered to bring one for lunch, pt declined. Prefers chocolate, RD to specify. Pt denied further nutrition related questions or concerns. RD encouraged po intakes, will continue to monitor.    Nutrition Related Findings:    BG  x24 hrs. x4 BM 4/6. +1 BLE edema. Wound Type: Skin Tears       Current Nutrition Intake & Therapies:    Average Meal Intake: 1-25%, 26-50%, 51-75%, %  Average Supplements Intake: None Ordered  ADULT DIET; Regular; 4 carb choices (60 gm/meal)    Anthropometric Measures:  Height: 160 cm (5' 2.99\")  Ideal Body Weight (IBW): 115 lbs (52 kg)       Current Body Weight: 81.6 kg (179 lb 14.3 oz),   IBW. Weight Source: Standing Scale  Current BMI (kg/m2): 31.9        Weight Adjustment For: No Adjustment                 BMI Categories: Obese Class 1 (BMI 30.0-34.9)    Estimated Daily Nutrient Needs:  Energy Requirements Based On: Kcal/kg  Weight Used for Energy Requirements: Ideal  Energy (kcal/day): 0824-4147  Weight Used for Protein Requirements: Ideal  Protein (g/day): 52-62  Method Used for

## 2024-04-11 LAB
ANION GAP SERPL CALCULATED.3IONS-SCNC: 13 MMOL/L (ref 3–16)
BASOPHILS # BLD: 0.1 K/UL (ref 0–0.2)
BASOPHILS NFR BLD: 1.1 %
BUN SERPL-MCNC: 24 MG/DL (ref 7–20)
CALCIUM SERPL-MCNC: 9.5 MG/DL (ref 8.3–10.6)
CHLORIDE SERPL-SCNC: 90 MMOL/L (ref 99–110)
CO2 SERPL-SCNC: 27 MMOL/L (ref 21–32)
CREAT SERPL-MCNC: 1.2 MG/DL (ref 0.6–1.2)
DEPRECATED RDW RBC AUTO: 16.8 % (ref 12.4–15.4)
EOSINOPHIL # BLD: 0.1 K/UL (ref 0–0.6)
EOSINOPHIL NFR BLD: 1.5 %
GFR SERPLBLD CREATININE-BSD FMLA CKD-EPI: 43 ML/MIN/{1.73_M2}
GLUCOSE BLD-MCNC: 101 MG/DL (ref 70–99)
GLUCOSE BLD-MCNC: 103 MG/DL (ref 70–99)
GLUCOSE BLD-MCNC: 172 MG/DL (ref 70–99)
GLUCOSE BLD-MCNC: 245 MG/DL (ref 70–99)
GLUCOSE BLD-MCNC: 50 MG/DL (ref 70–99)
GLUCOSE BLD-MCNC: 60 MG/DL (ref 70–99)
GLUCOSE BLD-MCNC: 72 MG/DL (ref 70–99)
GLUCOSE SERPL-MCNC: 185 MG/DL (ref 70–99)
HCT VFR BLD AUTO: 43.7 % (ref 36–48)
HGB BLD-MCNC: 14.2 G/DL (ref 12–16)
LYMPHOCYTES # BLD: 0.9 K/UL (ref 1–5.1)
LYMPHOCYTES NFR BLD: 10.4 %
MCH RBC QN AUTO: 30.4 PG (ref 26–34)
MCHC RBC AUTO-ENTMCNC: 32.5 G/DL (ref 31–36)
MCV RBC AUTO: 93.4 FL (ref 80–100)
MONOCYTES # BLD: 0.5 K/UL (ref 0–1.3)
MONOCYTES NFR BLD: 6.5 %
NEUTROPHILS # BLD: 6.7 K/UL (ref 1.7–7.7)
NEUTROPHILS NFR BLD: 80.5 %
PERFORMED ON: ABNORMAL
PERFORMED ON: NORMAL
PLATELET # BLD AUTO: 281 K/UL (ref 135–450)
PMV BLD AUTO: 8.1 FL (ref 5–10.5)
POTASSIUM SERPL-SCNC: 4.4 MMOL/L (ref 3.5–5.1)
RBC # BLD AUTO: 4.67 M/UL (ref 4–5.2)
SODIUM SERPL-SCNC: 130 MMOL/L (ref 136–145)
WBC # BLD AUTO: 8.4 K/UL (ref 4–11)

## 2024-04-11 PROCEDURE — 80048 BASIC METABOLIC PNL TOTAL CA: CPT

## 2024-04-11 PROCEDURE — 97116 GAIT TRAINING THERAPY: CPT

## 2024-04-11 PROCEDURE — 97530 THERAPEUTIC ACTIVITIES: CPT

## 2024-04-11 PROCEDURE — 94640 AIRWAY INHALATION TREATMENT: CPT

## 2024-04-11 PROCEDURE — 36415 COLL VENOUS BLD VENIPUNCTURE: CPT

## 2024-04-11 PROCEDURE — 85025 COMPLETE CBC W/AUTO DIFF WBC: CPT

## 2024-04-11 PROCEDURE — 97110 THERAPEUTIC EXERCISES: CPT

## 2024-04-11 PROCEDURE — 1280000000 HC REHAB R&B

## 2024-04-11 PROCEDURE — 97129 THER IVNTJ 1ST 15 MIN: CPT

## 2024-04-11 PROCEDURE — 6370000000 HC RX 637 (ALT 250 FOR IP): Performed by: STUDENT IN AN ORGANIZED HEALTH CARE EDUCATION/TRAINING PROGRAM

## 2024-04-11 PROCEDURE — 97535 SELF CARE MNGMENT TRAINING: CPT

## 2024-04-11 PROCEDURE — 97130 THER IVNTJ EA ADDL 15 MIN: CPT

## 2024-04-11 RX ORDER — INSULIN GLARGINE 100 [IU]/ML
10 INJECTION, SOLUTION SUBCUTANEOUS NIGHTLY
Status: DISCONTINUED | OUTPATIENT
Start: 2024-04-11 | End: 2024-04-16 | Stop reason: HOSPADM

## 2024-04-11 RX ADMIN — METOPROLOL SUCCINATE 25 MG: 25 TABLET, FILM COATED, EXTENDED RELEASE ORAL at 08:03

## 2024-04-11 RX ADMIN — ASPIRIN 81 MG 81 MG: 81 TABLET ORAL at 08:03

## 2024-04-11 RX ADMIN — ATORVASTATIN CALCIUM 20 MG: 10 TABLET, FILM COATED ORAL at 08:03

## 2024-04-11 RX ADMIN — DULOXETINE HYDROCHLORIDE 60 MG: 60 CAPSULE, DELAYED RELEASE ORAL at 08:03

## 2024-04-11 RX ADMIN — BUPROPION HYDROCHLORIDE 300 MG: 150 TABLET, EXTENDED RELEASE ORAL at 08:03

## 2024-04-11 RX ADMIN — FUROSEMIDE 20 MG: 20 TABLET ORAL at 08:02

## 2024-04-11 RX ADMIN — Medication 16 G: at 06:48

## 2024-04-11 RX ADMIN — INSULIN LISPRO 2 UNITS: 100 INJECTION, SOLUTION INTRAVENOUS; SUBCUTANEOUS at 11:30

## 2024-04-11 RX ADMIN — SPIRONOLACTONE 25 MG: 25 TABLET ORAL at 08:03

## 2024-04-11 RX ADMIN — LISINOPRIL 5 MG: 5 TABLET ORAL at 08:03

## 2024-04-11 RX ADMIN — Medication 400 MG: at 08:03

## 2024-04-11 RX ADMIN — APIXABAN 2.5 MG: 2.5 TABLET, FILM COATED ORAL at 21:13

## 2024-04-11 RX ADMIN — INSULIN GLARGINE 10 UNITS: 100 INJECTION, SOLUTION SUBCUTANEOUS at 21:12

## 2024-04-11 RX ADMIN — APIXABAN 2.5 MG: 2.5 TABLET, FILM COATED ORAL at 08:03

## 2024-04-11 RX ADMIN — TIOTROPIUM BROMIDE AND OLODATEROL 2 PUFF: 3.124; 2.736 SPRAY, METERED RESPIRATORY (INHALATION) at 07:12

## 2024-04-11 NOTE — CARE COORDINATION
Weekly team care conference with interdisciplinary team on: 04/10/24    Chart reviewed for length of stay. IP day # 8  Unit:  [facility]  Diagnosis and current status as per MD progress: Late effects of cerebral ischemic stroke   Consultants Following: Neuro and palliative  Planned Discharge Date: 04/14/24  Durable medical equipment needed: RW  Discharge Plan: 24 hour supervision or assist;Home with Home health vs SNF    Discussion: CM spoke to Zhanna (granddaughter) via telephone about team conference DCP. Zhanna would like for writer to call [facility] to see if they have skilled level of care. CM acknowledged and will update Zhanna.    Citlali Thompson, RN

## 2024-04-11 NOTE — PROGRESS NOTES
mins to recreate diagram picture. Provided vb and visual cues to accurately recreate picture; pt required cues for 75% of task. Pt demo'd improved command following and standing tolerance.     Assessment  Assessment  Assessment: Pt improved activity tolerance during therapy. Pt SBA for TB dressing, with improved task sequence and command following. Pt stood for 6:30 and 5 minutes to complete pattern block task, requiring cues for 75% of task. Pt completed simple IADL of folding laundry, progressing standing tolerance to 8 minutes iwth SBA. Pt demo'd improved balance, with only 1 LOB noted during session. Pt seated in recliner at end of session with all needs met. Continue OT POC.  Activity Tolerance: Treatment limited secondary to decreased cognition;Patient limited by fatigue  Discharge Recommendations: 24 hour supervision or assist;Continue to assess pending progress;Home with Home health OT  Factors Affecting Discharge: Pt will require strict 24/7 SPV d/t decreased safety awareness and LOB when amb. if patient d/c home, pt may beneift from home health aide, meals on wheels, cleaning services, to assist with additional ADLs  OT Equipment Recommendations  Equipment Needed: Yes  Mobility Devices: ADL Assistive Devices  ADL Assistive Devices: Shower Chair with back  Safety Devices  Safety Devices in place: Yes  Type of devices: Patient at risk for falls;Call light within reach;Gait belt;All fall risk precautions in place;Nurse notified;Left in chair;Chair alarm in place  Restraints  Initially in place: No    Patient Education  Education  Education Given To: Patient  Education Provided: Role of Therapy;ADL Function;Plan of Care;Mobility Training;Transfer Training;Safety;Fall Prevention Strategies;Precautions;Cognition;Energy Conservation  Education Provided Comments: safe transfer techniques, AE for LB dressing, orientation to place and time  Education Method: Verbal  Barriers to Learning: Cognition  Education

## 2024-04-11 NOTE — PLAN OF CARE
Problem: Safety - Adult  Goal: Free from fall injury  4/11/2024 0925 by Citlali Darnell, RN  Outcome: Progressing  4/10/2024 2055 by Fatmata Silverman, RN  Outcome: Progressing     Problem: Skin/Tissue Integrity  Goal: Absence of new skin breakdown  Description: 1.  Monitor for areas of redness and/or skin breakdown  4/10/2024 2055 by Fatmata Silverman, RN  Outcome: Progressing      Message left notifying patient Rx was sent by Dr. Rodrigues.

## 2024-04-11 NOTE — PROGRESS NOTES
Pt. Assessment complete. Pt. Sitting up in chair, watching TV. Pt. Denies any complaints at this time. Fall precautions maintained. Plan of care reviewed.

## 2024-04-11 NOTE — PLAN OF CARE
Problem: Discharge Planning  Goal: Discharge to home or other facility with appropriate resources  4/10/2024 2055 by Fatmata Silverman, RN  Outcome: Progressing  Flowsheets (Taken 4/10/2024 1945)  Discharge to home or other facility with appropriate resources: Identify barriers to discharge with patient and caregiver  4/10/2024 0949 by Kaylee Greer RN  Outcome: Progressing     Problem: Safety - Adult  Goal: Free from fall injury  Outcome: Progressing     Problem: Skin/Tissue Integrity  Goal: Absence of new skin breakdown  Description: 1.  Monitor for areas of redness and/or skin breakdown  2.  Assess vascular access sites hourly  3.  Every 4-6 hours minimum:  Change oxygen saturation probe site  4.  Every 4-6 hours:  If on nasal continuous positive airway pressure, respiratory therapy assess nares and determine need for appliance change or resting period.  Outcome: Progressing     Problem: ABCDS Injury Assessment  Goal: Absence of physical injury  Outcome: Progressing     Problem: Nutrition Deficit:  Goal: Optimize nutritional status  Outcome: Progressing  Flowsheets (Taken 4/10/2024 0841 by Gabbie Handy, CARLEEN)  Nutrient intake appropriate for improving, restoring, or maintaining nutritional needs:   Assess nutritional status and recommend course of action   Recommend appropriate diets, oral nutritional supplements, and vitamin/mineral supplements   Monitor oral intake, labs, and treatment plans     Problem: Pain  Goal: Verbalizes/displays adequate comfort level or baseline comfort level  Outcome: Progressing

## 2024-04-11 NOTE — PROGRESS NOTES
Physical Therapy  Facility/Department: [facility]  Rehabilitation Physical Therapy Treatment Note    NAME: Maria E Valdez  : 8/3/1934 (89 y.o.)  MRN: [id number]  CODE STATUS: Full Code    Date of Service: 24       Restrictions:  Restrictions/Precautions: General Precautions, Fall Risk, Up as Tolerated, Bed Alarm  Position Activity Restriction  Other position/activity restrictions: fall risk,     SUBJECTIVE  Subjective  Subjective: Pt in recliner, requesting toileting  Pain: Pt denies pain          OBJECTIVE  143/70, 98%, 90 bpm at rest; 103/71 after ambulation; 113/72 after seated exercises; 122/83 after step ups    Cognition  Overall Cognitive Status: Exceptions  Arousal/Alertness: Inconsistent responses to stimuli  Following Commands: Follows one step commands with increased time;Follows one step commands with repetition  Attention Span: Attends with cues to redirect  Memory: Decreased short term memory;Decreased recall of recent events;Decreased long term memory  Safety Judgement: Decreased awareness of need for assistance;Decreased awareness of need for safety  Problem Solving: Assistance required to generate solutions;Assistance required to identify errors made;Assistance required to implement solutions;Decreased awareness of errors;Assistance required to correct errors made  Insights: Decreased awareness of deficits  Initiation: Requires cues for some  Sequencing: Requires cues for some  Orientation  Overall Orientation Status: Impaired  Orientation Level: Disoriented to place;Disoriented to time;Oriented to person;Disoriented to situation    Functional Mobility  Sit to Stand  Assistance Level: Contact guard assist  Skilled Clinical Factors: with RW  Stand to Sit  Assistance Level: Contact guard assist  Skilled Clinical Factors: with RW, cues for safely aligning self with chairs and reaching back prior to sitting      Environmental Mobility  Ambulation  Surface: Level surface  Device: Rolling

## 2024-04-11 NOTE — PROGRESS NOTES
Morning Blood sugar 50. Pt. Complains of dizziness. Pt. Has no IV access. Pt. Given OJ and cristian crackers and milk. R/P blood sugar 60.  Pt. Given glucose tablets. Will re-check blood sugar.

## 2024-04-11 NOTE — PROGRESS NOTES
deficit  Discharge recommendations:  [] Home independently  [] Home with assistance [x]  24 hour supervision  [] ECF [] Other:  Continued Tx Upon Discharge: ? [x] Yes [] No [] TBD based on progress while on ARU [] Vital Stim indicated [] Other:   Estimated discharge date: 04/14/24    Interventions used this date:  [] Speech/Language Treatment  [] Instruction in HEP [] Group [] Dysphagia Treatment [x] Cognitive Treatment   [] Other:      Total Time Breakdown / Charges    Time in Time out Total Time / units   Cognitive Tx 0930 1030 60 min / 4 units    Speech Tx      Dysphagia Tx          Electronically Signed by     Chelsie Davis M.A. CCC-SLP   Speech-Language Pathologist

## 2024-04-11 NOTE — PROGRESS NOTES
Maria E Valdez  4/11/2024  [id number]    Chief Complaint: Late effects of cerebral ischemic stroke    Subjective:   No acute events overnight. Today Jenn is seen in her room, resting in bed. She appears confused. She is oriented to self. CT head yesterday negative for acute process. Will obtain labs today to evaluate for infectious or metabolic etiology. If unrevealing, will consult Neurology.     ROS: denies f/c, n/v, cp, sob    Objective:  Patient Vitals for the past 24 hrs:   BP Temp Temp src Pulse Resp SpO2 Weight   04/11/24 0800 (!) 140/76 97.8 °F (36.6 °C) Oral 80 16 94 % --   04/11/24 0715 -- -- -- -- -- 95 % --   04/11/24 0600 -- -- -- -- -- -- 79.5 kg (175 lb 3.2 oz)   04/10/24 1945 126/73 97.5 °F (36.4 °C) Oral 94 18 94 % --   04/10/24 1017 126/66 -- -- 82 -- 93 % --   04/10/24 0910 125/63 97.5 °F (36.4 °C) Oral 85 18 96 % --     Gen: No distress, pleasant.  HEENT: Normocephalic, atraumatic.   CV: RRR  Resp: No respiratory distress. Lungs CTAB  Abd: Soft, nondistended  Ext: No edema.   Neuro: Alert, oriented to self  Psych: appropriate mood and affect    Wt Readings from Last 3 Encounters:   04/11/24 79.5 kg (175 lb 3.2 oz)   04/03/24 84.6 kg (186 lb 8 oz)   05/18/19 88.5 kg (195 lb)       Laboratory data:   Lab Results   Component Value Date    WBC 8.4 04/10/2024    HGB 13.4 04/10/2024    HCT 40.3 04/10/2024    MCV 92.3 04/10/2024     04/10/2024       Lab Results   Component Value Date/Time     04/10/2024 06:06 AM    K 4.1 04/10/2024 06:06 AM    K 4.1 04/09/2024 06:53 AM    CL 95 04/10/2024 06:06 AM    CO2 31 04/10/2024 06:06 AM    BUN 21 04/10/2024 06:06 AM    CREATININE 1.2 04/10/2024 06:06 AM    GLUCOSE 70 04/10/2024 06:06 AM    CALCIUM 9.5 04/10/2024 06:06 AM        Therapy progress:  Physical therapy:  Bed Mobility:     Sit>supine:  Assistance Level: Modified independent  Supine>sit:  Assistance Level: Modified independent  Transfers:     Sit>stand:  Assistance Level: Contact guard

## 2024-04-11 NOTE — PROGRESS NOTES
Pt. Assisted from chair to bathroom and back to bed with gait belt and walker. Pt. Tolerated well.

## 2024-04-12 LAB
ANION GAP SERPL CALCULATED.3IONS-SCNC: 16 MMOL/L (ref 3–16)
BASOPHILS # BLD: 0 K/UL (ref 0–0.2)
BASOPHILS NFR BLD: 0.2 %
BUN SERPL-MCNC: 25 MG/DL (ref 7–20)
CALCIUM SERPL-MCNC: 9.3 MG/DL (ref 8.3–10.6)
CHLORIDE SERPL-SCNC: 90 MMOL/L (ref 99–110)
CO2 SERPL-SCNC: 24 MMOL/L (ref 21–32)
CREAT SERPL-MCNC: 1.2 MG/DL (ref 0.6–1.2)
DEPRECATED RDW RBC AUTO: 17.7 % (ref 12.4–15.4)
EOSINOPHIL # BLD: 0.1 K/UL (ref 0–0.6)
EOSINOPHIL NFR BLD: 1 %
GFR SERPLBLD CREATININE-BSD FMLA CKD-EPI: 43 ML/MIN/{1.73_M2}
GLUCOSE BLD-MCNC: 129 MG/DL (ref 70–99)
GLUCOSE BLD-MCNC: 158 MG/DL (ref 70–99)
GLUCOSE BLD-MCNC: 192 MG/DL (ref 70–99)
GLUCOSE BLD-MCNC: 199 MG/DL (ref 70–99)
GLUCOSE SERPL-MCNC: 131 MG/DL (ref 70–99)
HCT VFR BLD AUTO: 45.7 % (ref 36–48)
HGB BLD-MCNC: 14.5 G/DL (ref 12–16)
LYMPHOCYTES # BLD: 1.1 K/UL (ref 1–5.1)
LYMPHOCYTES NFR BLD: 11.7 %
MAGNESIUM SERPL-MCNC: 2.2 MG/DL (ref 1.8–2.4)
MCH RBC QN AUTO: 31 PG (ref 26–34)
MCHC RBC AUTO-ENTMCNC: 31.7 G/DL (ref 31–36)
MCV RBC AUTO: 97.8 FL (ref 80–100)
MONOCYTES # BLD: 0.7 K/UL (ref 0–1.3)
MONOCYTES NFR BLD: 7.1 %
NEUTROPHILS # BLD: 7.8 K/UL (ref 1.7–7.7)
NEUTROPHILS NFR BLD: 80 %
PERFORMED ON: ABNORMAL
PLATELET # BLD AUTO: 266 K/UL (ref 135–450)
PMV BLD AUTO: 8 FL (ref 5–10.5)
POTASSIUM SERPL-SCNC: 4.4 MMOL/L (ref 3.5–5.1)
RBC # BLD AUTO: 4.67 M/UL (ref 4–5.2)
SODIUM SERPL-SCNC: 130 MMOL/L (ref 136–145)
WBC # BLD AUTO: 9.7 K/UL (ref 4–11)

## 2024-04-12 PROCEDURE — 97130 THER IVNTJ EA ADDL 15 MIN: CPT

## 2024-04-12 PROCEDURE — 1280000000 HC REHAB R&B

## 2024-04-12 PROCEDURE — 97110 THERAPEUTIC EXERCISES: CPT

## 2024-04-12 PROCEDURE — 97116 GAIT TRAINING THERAPY: CPT

## 2024-04-12 PROCEDURE — 36415 COLL VENOUS BLD VENIPUNCTURE: CPT

## 2024-04-12 PROCEDURE — 6370000000 HC RX 637 (ALT 250 FOR IP): Performed by: STUDENT IN AN ORGANIZED HEALTH CARE EDUCATION/TRAINING PROGRAM

## 2024-04-12 PROCEDURE — 83735 ASSAY OF MAGNESIUM: CPT

## 2024-04-12 PROCEDURE — 85025 COMPLETE CBC W/AUTO DIFF WBC: CPT

## 2024-04-12 PROCEDURE — 80048 BASIC METABOLIC PNL TOTAL CA: CPT

## 2024-04-12 PROCEDURE — 97530 THERAPEUTIC ACTIVITIES: CPT

## 2024-04-12 PROCEDURE — 97129 THER IVNTJ 1ST 15 MIN: CPT

## 2024-04-12 PROCEDURE — 94640 AIRWAY INHALATION TREATMENT: CPT

## 2024-04-12 RX ADMIN — ONDANSETRON 4 MG: 4 TABLET, ORALLY DISINTEGRATING ORAL at 21:13

## 2024-04-12 RX ADMIN — Medication 3 MG: at 21:13

## 2024-04-12 RX ADMIN — ATORVASTATIN CALCIUM 20 MG: 10 TABLET, FILM COATED ORAL at 09:06

## 2024-04-12 RX ADMIN — DULOXETINE HYDROCHLORIDE 60 MG: 60 CAPSULE, DELAYED RELEASE ORAL at 09:06

## 2024-04-12 RX ADMIN — INSULIN GLARGINE 10 UNITS: 100 INJECTION, SOLUTION SUBCUTANEOUS at 21:13

## 2024-04-12 RX ADMIN — LISINOPRIL 5 MG: 5 TABLET ORAL at 09:06

## 2024-04-12 RX ADMIN — BUPROPION HYDROCHLORIDE 300 MG: 150 TABLET, EXTENDED RELEASE ORAL at 09:06

## 2024-04-12 RX ADMIN — POLYETHYLENE GLYCOL 3350 17 G: 17 POWDER, FOR SOLUTION ORAL at 21:13

## 2024-04-12 RX ADMIN — ASPIRIN 81 MG 81 MG: 81 TABLET ORAL at 09:06

## 2024-04-12 RX ADMIN — SPIRONOLACTONE 25 MG: 25 TABLET ORAL at 09:06

## 2024-04-12 RX ADMIN — METOPROLOL SUCCINATE 25 MG: 25 TABLET, FILM COATED, EXTENDED RELEASE ORAL at 09:06

## 2024-04-12 RX ADMIN — TIOTROPIUM BROMIDE AND OLODATEROL 2 PUFF: 3.124; 2.736 SPRAY, METERED RESPIRATORY (INHALATION) at 09:14

## 2024-04-12 RX ADMIN — APIXABAN 2.5 MG: 2.5 TABLET, FILM COATED ORAL at 21:13

## 2024-04-12 RX ADMIN — Medication 400 MG: at 09:06

## 2024-04-12 RX ADMIN — APIXABAN 2.5 MG: 2.5 TABLET, FILM COATED ORAL at 09:06

## 2024-04-12 RX ADMIN — ONDANSETRON 4 MG: 4 TABLET, ORALLY DISINTEGRATING ORAL at 04:10

## 2024-04-12 ASSESSMENT — PAIN SCALES - GENERAL
PAINLEVEL_OUTOF10: 0
PAINLEVEL_OUTOF10: 0

## 2024-04-12 NOTE — PLAN OF CARE
Problem: Safety - Adult  Goal: Free from fall injury  4/12/2024 0820 by Richie Goodson RN  Outcome: Progressing  4/12/2024 0014 by Kendal Espinoza RN  Outcome: Progressing     Problem: Safety - Adult  Goal: Free from fall injury  4/12/2024 0014 by Kendal Espinoza, RN  Outcome: Progressing     Problem: Skin/Tissue Integrity  Goal: Absence of new skin breakdown  Description: 1.  Monitor for areas of redness and/or skin breakdown  2.  Assess vascular access sites hourly  3.  Every 4-6 hours minimum:  Change oxygen saturation probe site  4.  Every 4-6 hours:  If on nasal continuous positive airway pressure, respiratory therapy assess nares and determine need for appliance change or resting period.  4/12/2024 0820 by Richie Goodson RN  Outcome: Progressing  4/12/2024 0014 by Kendal Espinoza, RN  Outcome: Progressing

## 2024-04-12 NOTE — PROGRESS NOTES
[facility]: ACUTE REHAB UNIT  SPEECH-LANGUAGE PATHOLOGY      [x] Daily  [] Weekly Care Conference Note  [] Discharge    Patient:Maria E Valdez      :8/3/1934  MRN:[id number]  Rehab Dx/Hx: Late effects of cerebral ischemic stroke [I69.30]    Precautions: falls and aspirations  Home situation: Lives alone at [facility] Dx: [] Aphasia  [] Dysarthria  [] Apraxia   [x] Oropharyngeal dysphagia [x] Cognitive Impairment  [] Other:   Date of Admit: 4/3/2024  Room #: 0154/0154-01    Current functional status (updated daily):         Pt being seen for : [] Speech/Language Treatment  [x] Dysphagia Treatment [x] Cognitive Treatment  [] Other:  Communication: [x]WFL  [] Aphasia  [] Dysarthria  [] Apraxia  [] Pragmatic Impairment [] Non-verbal  [] Hearing Loss  [] Other:   Cognition: [] WFL  [] Mild  [x] Moderate  [x] Severe [] Unable to Assess  [] Other:  Memory: [] WFL  [] Mild  [] Moderate  [x] Severe [] Unable to Assess  [] Other:  Behavior: [x] Alert  [x] Cooperative  [x]  Pleasant  [] Confused  [] Agitated  [] Uncooperative  [] Distractible [] Motivated  [] Self-Limiting [] Anxious  [] Other:  Endurance:  [x] Adequate for participation in SLP sessions  [] Reduced overall  [] Lethargic  [] Other:  Safety: [] No concerns at this time  [x] Reduced insight into deficits  [x]  Reduced safety awareness [] Not following call light procedures  [] Unable to Assess  [] Other:    Current Diet Order:ADULT DIET; Regular; 4 carb choices (60 gm/meal)  ADULT ORAL NUTRITION SUPPLEMENT; Dinner; Diabetic Oral Supplement  Swallowing Precautions: Sit up for all meals and thereafter for 30 minutes and Eat with small bites (1/2 tsp; 1 tsp)      Date: 2024      Tx session 1  8440-2617 Tx session 2  All tx needs met in session 1   Total Timed Code Min 60    Total Treatment Minutes 60    Individual Treatment Minutes 60    Group Treatment Minutes 0 0   Co-Treat Minutes 0 0   Variance/Reason:  N/A    Pain denies    Pain Intervention

## 2024-04-12 NOTE — PROGRESS NOTES
CVA  - secondary stroke prevention with asa, statin, Eliquis  - meclizine PRN for dizziness  - PT, OT, ST    Increased confusion  - UA negative  - CT head negative for acute process  - consult Neurology, appreciate assistance    SARANYA  - holding lasix and lisinopril  - continue to monitor    Hyponatremia  - suspect due to diuresis  - holding lasix  - continue to monitor     Urinary Retention  - seen by Urology during acute stay  - flomax discontinued due to increased risk for falls and dizziness  - pollack removed 4/3  - monitor for signs/symptoms of retention     Hypomagnesemia, improved  - decreased daily replacement     Atrial Fibrillation  - Eliquis, Toprol XL     HFrEF  - hold lasix, lisinopril, and spironolactone  - daily weights, I/Os     HTN  - Toprol XL,  - hold lisinopril, lasix, spironolactone     DM2 with polyneuropathy  - decreased Lantus to 10 units qhs due to hypogylcemia  - continue SSI  - amaryl     COPD  - Stiolto     Depression  - cymbalta, wellbutrin     Bowels: adjust medications as needed for regular bowel movements      Bladder: Check PVR x 3.  IMC if PVR > 200ml or if any volume is > 500 ml.      Sleep: melatonin provided prn.      PPX  DVT: on AC  GI: not indicated     Follow up appointments: PCP    Therapy progress: Patient completed transfers with CGA   Services: SNF  EDOD: 4/15    Charlotte Serrano MD 4/12/2024, 10:32 AM

## 2024-04-12 NOTE — PLAN OF CARE
Problem: Discharge Planning  Goal: Discharge to home or other facility with appropriate resources  Recent Flowsheet Documentation  Taken 4/11/2024 1159 by Citlali Darnell RN  Discharge to home or other facility with appropriate resources: Identify barriers to discharge with patient and caregiver

## 2024-04-12 NOTE — PROGRESS NOTES
with CGA and RW, ambulates 150 ft with RW and CGA, tolerates 4 stairs with BHR and CGA, and completes supine<>sit independently. Pt completes seated exercises against resistance for LE strengthening. Recommend 24/7 and home PT at d/c with RW.  Activity Tolerance: Treatment limited secondary to decreased cognition;Patient limited by fatigue  Discharge Recommendations: 24 hour supervision or assist;Home with Home health PT  PT Equipment Recommendations  Equipment Needed: Yes  Mobility Devices: Walker  Walker: Rolling  Other: RW    Goals  Patient Goals   Patient Goals : \"to walk by myself and go home\"  Short Term Goals  Time Frame for Short Term Goals: 6 days (4/8/24)  Short Term Goal 1: pt will complete bed mobility with mod-ind- GOAL MET mod ind  Short Term Goal 2: pt will perform transfers with LRAD and supv- GOAL NOT MET< CGA  Short Term Goal 3: pt will ambulate 100 ft with LRAD and SBA- GOAL NOT MET< CGA-min A  Short Term Goal 4: pt will perform 4 steps with hand rails and SBA- GOAL NOT MET, CGA for safety 2* mild unsteadiness  Long Term Goals  Time Frame for Long Term Goals : 10 days (4/12/24)  Long Term Goal 1: pt will perform bed mobility independently with HOB flat and no bed rails; goal met 4/12 - pt completes supine<>sit independently with bed flat and no rails  Long Term Goal 2: pt will perform functional transfers with LRAD and mod-ind; not met 4/12 - pt requires CGA-SBA for transfers with RW, cues for safety  Long Term Goal 3: pt will ambulate 150 ft with LRAD and mod-ind; not met 4/12 - pt ambulates 150 ft with RW and CGA  Long Term Goal 4: pt will perform 4 steps with hand rails and supv; not met 4/12 - pt requires CGA for 4 stairs with BHR    PLAN OF CARE/SAFETY  Physical Therapy Plan  General Plan: 5-7 times per week  Current Treatment Recommendations: Strengthening;Balance training;Functional mobility training;Transfer training;Gait training;Endurance training;Safety education & training;Therapeutic

## 2024-04-12 NOTE — PROGRESS NOTES
MET 4/11 pt folded laundry ~8 mins  Short Term Goal 5: Pt will tolerate x20 BUE ther ex- MET 4/09  Long Term Goals  Time Frame for Long Term Goals : 10 days (4/12/24)- progressing 4/12  Long Term Goal 1: Pt will complete toilet/functional transfer mod I  Long Term Goal 2: Pt will complete TB dressing mod I  Long Term Goal 3: Pt will tolerate standing for 5 mins to inc  ADL participation mod I  Long Term Goal 4: Pt will complete simple IADL with mod I        Therapy Time   Individual Concurrent Group Co-treatment   Time In 1400         Time Out 1500         Minutes 60         Timed Code Treatment Minutes: 60 Minutes       Sakina Darden, OT

## 2024-04-12 NOTE — PROGRESS NOTES
Physical Therapy  Discharge Summary    Name:Maria E Valdez  MRN:[id number]  :8/3/1934  Treatment Diagnosis: decreased balance, endurance, and functional mobility       Restrictions/Precautions  Restrictions/Precautions: General Precautions, Fall Risk, Up as Tolerated, Bed Alarm  Required Braces or Orthoses?: No           Position Activity Restriction  Other position/activity restrictions: fall risk,     Goals:                  Short Term Goals  Time Frame for Short Term Goals: 6 days (24)  Short Term Goal 1: pt will complete bed mobility with mod-ind- GOAL MET mod ind  Short Term Goal 2: pt will perform transfers with LRAD and supv- GOAL NOT MET< CGA  Short Term Goal 3: pt will ambulate 100 ft with LRAD and SBA- GOAL NOT MET< CGA-min A  Short Term Goal 4: pt will perform 4 steps with hand rails and SBA- GOAL NOT MET, CGA for safety 2* mild unsteadiness            Long Term Goals  Time Frame for Long Term Goals : 10 days (24)  Long Term Goal 1: pt will perform bed mobility independently with HOB flat and no bed rails; goal met  - pt completes supine<>sit independently with bed flat and no rails  Long Term Goal 2: pt will perform functional transfers with LRAD and mod-ind; not met  - pt requires CGA-SBA for transfers with RW, cues for safety  Long Term Goal 3: pt will ambulate 150 ft with LRAD and mod-ind; not met  - pt ambulates 150 ft with RW and CGA  Long Term Goal 4: pt will perform 4 steps with hand rails and supv; not met  - pt requires CGA for 4 stairs with BHR    Pt. Met 1/4 short term goals and 1/4 long term goals.     Pt. Currently ambulates 150 feet with RW and CGA to Betty  Up/down 4 steps with BHR and CGA to Betty  Sit to/from stand with CGA to Betty  Bed mobility with supervision-independent  Recommend d/c to SNF order to progress balance and safety  Provided pt. with written HEP for LE strengthening    Electronically signed by Jaquelin Redd PT on 4/15/2024 at 3:49 PM

## 2024-04-13 ENCOUNTER — APPOINTMENT (OUTPATIENT)
Dept: GENERAL RADIOLOGY | Age: 89
DRG: 057 | End: 2024-04-13
Attending: STUDENT IN AN ORGANIZED HEALTH CARE EDUCATION/TRAINING PROGRAM
Payer: MEDICARE

## 2024-04-13 LAB
ANION GAP SERPL CALCULATED.3IONS-SCNC: 12 MMOL/L (ref 3–16)
BACTERIA URNS QL MICRO: ABNORMAL /HPF
BILIRUB UR QL STRIP.AUTO: NEGATIVE
BUN SERPL-MCNC: 33 MG/DL (ref 7–20)
CALCIUM SERPL-MCNC: 9.5 MG/DL (ref 8.3–10.6)
CHLORIDE SERPL-SCNC: 86 MMOL/L (ref 99–110)
CLARITY UR: ABNORMAL
CO2 SERPL-SCNC: 29 MMOL/L (ref 21–32)
COLOR UR: YELLOW
CREAT SERPL-MCNC: 1.4 MG/DL (ref 0.6–1.2)
EPI CELLS #/AREA URNS HPF: ABNORMAL /HPF (ref 0–5)
GFR SERPLBLD CREATININE-BSD FMLA CKD-EPI: 36 ML/MIN/{1.73_M2}
GLUCOSE BLD-MCNC: 119 MG/DL (ref 70–99)
GLUCOSE BLD-MCNC: 150 MG/DL (ref 70–99)
GLUCOSE BLD-MCNC: 173 MG/DL (ref 70–99)
GLUCOSE BLD-MCNC: 181 MG/DL (ref 70–99)
GLUCOSE SERPL-MCNC: 205 MG/DL (ref 70–99)
GLUCOSE UR STRIP.AUTO-MCNC: NEGATIVE MG/DL
HGB UR QL STRIP.AUTO: ABNORMAL
KETONES UR STRIP.AUTO-MCNC: NEGATIVE MG/DL
LEUKOCYTE ESTERASE UR QL STRIP.AUTO: ABNORMAL
NITRITE UR QL STRIP.AUTO: NEGATIVE
PERFORMED ON: ABNORMAL
PH UR STRIP.AUTO: 6 [PH] (ref 5–8)
POTASSIUM SERPL-SCNC: 5 MMOL/L (ref 3.5–5.1)
PROT UR STRIP.AUTO-MCNC: NEGATIVE MG/DL
RBC #/AREA URNS HPF: ABNORMAL /HPF (ref 0–4)
RENAL EPI CELLS #/AREA UR COMP ASSIST: ABNORMAL /HPF (ref 0–1)
SODIUM SERPL-SCNC: 127 MMOL/L (ref 136–145)
SP GR UR STRIP.AUTO: 1.02 (ref 1–1.03)
UA DIPSTICK W REFLEX MICRO PNL UR: YES
URN SPEC COLLECT METH UR: ABNORMAL
UROBILINOGEN UR STRIP-ACNC: 0.2 E.U./DL
WBC #/AREA URNS HPF: >100 /HPF (ref 0–5)

## 2024-04-13 PROCEDURE — 97116 GAIT TRAINING THERAPY: CPT

## 2024-04-13 PROCEDURE — 97535 SELF CARE MNGMENT TRAINING: CPT

## 2024-04-13 PROCEDURE — 97130 THER IVNTJ EA ADDL 15 MIN: CPT

## 2024-04-13 PROCEDURE — 6370000000 HC RX 637 (ALT 250 FOR IP): Performed by: STUDENT IN AN ORGANIZED HEALTH CARE EDUCATION/TRAINING PROGRAM

## 2024-04-13 PROCEDURE — 81001 URINALYSIS AUTO W/SCOPE: CPT

## 2024-04-13 PROCEDURE — 1280000000 HC REHAB R&B

## 2024-04-13 PROCEDURE — 36415 COLL VENOUS BLD VENIPUNCTURE: CPT

## 2024-04-13 PROCEDURE — 92526 ORAL FUNCTION THERAPY: CPT

## 2024-04-13 PROCEDURE — 97129 THER IVNTJ 1ST 15 MIN: CPT

## 2024-04-13 PROCEDURE — 2580000003 HC RX 258: Performed by: STUDENT IN AN ORGANIZED HEALTH CARE EDUCATION/TRAINING PROGRAM

## 2024-04-13 PROCEDURE — 97530 THERAPEUTIC ACTIVITIES: CPT

## 2024-04-13 PROCEDURE — 94640 AIRWAY INHALATION TREATMENT: CPT

## 2024-04-13 PROCEDURE — 80048 BASIC METABOLIC PNL TOTAL CA: CPT

## 2024-04-13 PROCEDURE — 71046 X-RAY EXAM CHEST 2 VIEWS: CPT

## 2024-04-13 PROCEDURE — 99233 SBSQ HOSP IP/OBS HIGH 50: CPT | Performed by: PSYCHIATRY & NEUROLOGY

## 2024-04-13 PROCEDURE — 97112 NEUROMUSCULAR REEDUCATION: CPT

## 2024-04-13 RX ORDER — SULFAMETHOXAZOLE AND TRIMETHOPRIM 200; 40 MG/5ML; MG/5ML
160 SUSPENSION ORAL EVERY 12 HOURS
Status: DISCONTINUED | OUTPATIENT
Start: 2024-04-13 | End: 2024-04-16

## 2024-04-13 RX ORDER — 0.9 % SODIUM CHLORIDE 0.9 %
1000 INTRAVENOUS SOLUTION INTRAVENOUS ONCE
Status: COMPLETED | OUTPATIENT
Start: 2024-04-13 | End: 2024-04-14

## 2024-04-13 RX ADMIN — SODIUM CHLORIDE 1000 ML: 9 INJECTION, SOLUTION INTRAVENOUS at 23:54

## 2024-04-13 RX ADMIN — Medication 3 MG: at 20:14

## 2024-04-13 RX ADMIN — ASPIRIN 81 MG 81 MG: 81 TABLET ORAL at 09:13

## 2024-04-13 RX ADMIN — Medication 400 MG: at 09:13

## 2024-04-13 RX ADMIN — TIOTROPIUM BROMIDE AND OLODATEROL 2 PUFF: 3.124; 2.736 SPRAY, METERED RESPIRATORY (INHALATION) at 08:31

## 2024-04-13 RX ADMIN — DULOXETINE HYDROCHLORIDE 60 MG: 60 CAPSULE, DELAYED RELEASE ORAL at 09:13

## 2024-04-13 RX ADMIN — ATORVASTATIN CALCIUM 20 MG: 10 TABLET, FILM COATED ORAL at 09:13

## 2024-04-13 RX ADMIN — INSULIN GLARGINE 10 UNITS: 100 INJECTION, SOLUTION SUBCUTANEOUS at 20:14

## 2024-04-13 RX ADMIN — METOPROLOL SUCCINATE 25 MG: 25 TABLET, FILM COATED, EXTENDED RELEASE ORAL at 09:13

## 2024-04-13 RX ADMIN — POLYETHYLENE GLYCOL 3350 17 G: 17 POWDER, FOR SOLUTION ORAL at 20:14

## 2024-04-13 RX ADMIN — APIXABAN 2.5 MG: 2.5 TABLET, FILM COATED ORAL at 20:14

## 2024-04-13 RX ADMIN — SULFAMETHOXAZOLE AND TRIMETHOPRIM 20 ML: 200; 40 SUSPENSION ORAL at 23:59

## 2024-04-13 RX ADMIN — APIXABAN 2.5 MG: 2.5 TABLET, FILM COATED ORAL at 09:13

## 2024-04-13 RX ADMIN — BUPROPION HYDROCHLORIDE 300 MG: 150 TABLET, EXTENDED RELEASE ORAL at 09:13

## 2024-04-13 ASSESSMENT — PAIN SCALES - GENERAL: PAINLEVEL_OUTOF10: 0

## 2024-04-13 NOTE — PROGRESS NOTES
compensatory strategies with 70% acc given mod cues   Recall of Visual Aids  - Pt with no demonstrated recall of visual aid usage, following training and education  - SLP edu pt re: location and use of visual aids     Goal 3: Pt will complete executive function tasks (e.g. meds, time, money, etc) with 70% acc given mod cues   Reading Prescription  - Pt unable to read small print despite presence of glasses. Pt able to read large print with SLP acting as scribe. When given simple sentences and large print, pt able to answer 4/4 questions regarding a prescription. Pt with 0/7 accurate answers when reading small print prescription example.  - Pt endorses previously managing own medications with a desire to manage medications again. Would recommend 1:1 supervision with medications at this time d/t poor orientation, reading, and thought organization.    Goal 4: Pt will complete problem solving and thought organization tasks with 70% acc given mod cues   Phone Calls  - Pt with 50% accuracy with max cues when describing how to make a phone call from the hospital.  - Pt requires max cueing to locate notebook with written phone numbers and to locate phone instructions    Goal 5: Pt will complete verbal and visual reasoning tasks with 70% acc given mod cues   Goal not addressed    Other areas targeted: N/A    Education:   SLP edu pt re: location and use of visual aids     Safety Devices: [x] Call light within reach  [] Chair alarm activated  [x] Bed alarm activated  [] Other:  [] Call light within reach  [] Chair alarm activated  [] Bed alarm activated  [] Other:    Assessment: Pt presents with ongoing mod-severe cognitive impairments characterized by poor orientation, problem solving difficulties, and no use of trained memory/orientation visual aids. Pt would benefit from reminders of visual cues at initiation of each session. Pt with desire to manage medications upon discharge; however, SLP would recommend 1:1 supervision

## 2024-04-13 NOTE — DISCHARGE SUMMARY
Occupational Therapy  Discharge Summary     Name:Maria E Valdez  MRN:[id number]  :8/3/1934  Treatment Diagnosis: decreased balance, endurance, and functional mobility       Restrictions/Precautions  Restrictions/Precautions: General Precautions, Fall Risk, Up as Tolerated, Bed Alarm  Required Braces or Orthoses?: No           Position Activity Restriction  Other position/activity restrictions: fall risk,     Goals:   Patient Goals   Patient goals : \"to not fall again\"  Short Term Goals  Time Frame for Short Term Goals: 6 days (24)  Short Term Goal 1: Pt will complete toilet/functional transfer SPV- not met pt SBA-min A for functional trasnfesr 4/15/24  Short Term Goal 2: Pt will complete LB dressing with SBA and AE- MET   Short Term Goal 3: Pt will tolerate standing at sink to complete 1-2 grooming tasks with SBA- MET  pt completed 1 grooming task standing with SBA  Short Term Goal 4: Pt will complete simple IADL with SBA- MET  pt folded laundry ~8 mins  Short Term Goal 5: Pt will tolerate x20 BUE ther ex- MET   Long Term Goals  Time Frame for Long Term Goals : 10 days (24)-  Long Term Goal 1: Pt will complete toilet/functional transfer mod I - not met Pt  SBA-min A for transfers 4/15/24  Long Term Goal 2: Pt will complete TB dressing mod I- not met 4/15/24  Long Term Goal 3: Pt will tolerate standing for 5 mins to inc  ADL participation mod I- met  Pt stood for 8 minutes for IADL task  Long Term Goal 4: Pt will complete simple IADL with mod I- not met Pt SBA for IADLs and dynamic balance 24    Pt. Met 4/5 short term goals and  long term goals.   Pt still requiring CGA-SPV for BADL.     ADL:  Grooming/Oral Hygiene  Assistance Level: Set-up  Skilled Clinical Factors: seated to brush teeth  Upper Extremity Bathing  Assistance Level: Supervision  Skilled Clinical Factors: seated on TTB  Lower Extremity Bathing  Assistance Level: Minimal assistance  Skilled Clinical Factors:

## 2024-04-13 NOTE — PROGRESS NOTES
Physical Therapy  Facility/Department: [facility]  Rehabilitation Physical Therapy Treatment Note    NAME: Maria E Valdez  : 8/3/1934 (89 y.o.)  MRN: [id number]  CODE STATUS: Full Code    Date of Service: 24       Restrictions:  Restrictions/Precautions: General Precautions, Fall Risk, Up as Tolerated, Bed Alarm  Position Activity Restriction  Other position/activity restrictions: fall risk,     SUBJECTIVE  Subjective  Subjective: pt agreeable to participate ; c/o nausea at end of session  Pain: none reported throughout session        Post Treatment Pain Screening         OBJECTIVE  Cognition  Overall Cognitive Status: Exceptions  Arousal/Alertness: Delayed responses to stimuli  Following Commands: Follows one step commands with increased time;Follows one step commands with repetition  Attention Span: Attends with cues to redirect  Memory: Decreased short term memory;Decreased recall of recent events;Decreased long term memory  Safety Judgement: Decreased awareness of need for assistance;Decreased awareness of need for safety  Problem Solving: Assistance required to generate solutions;Assistance required to identify errors made;Assistance required to implement solutions;Decreased awareness of errors;Assistance required to correct errors made  Insights: Decreased awareness of deficits  Initiation: Requires cues for some  Sequencing: Requires cues for some  Orientation  Overall Orientation Status: Within Normal Limits  Orientation Level: partially oriented to person (knew name, required cues for correct birthdate, stated 1937; oriented to time; disoriented to place (knew hospital, stated \"[facility]\" \"[facility]\"; disoriented to situation     Functional Mobility  Scooting  Assistance Level: Modified independent  Skilled Clinical Factors: scoot forward and backward in recliner using arm rests and weight shifting  Balance  Sitting Balance: Independent  Standing Balance: Contact guard assistance  Standing Balance:

## 2024-04-13 NOTE — PROGRESS NOTES
Occupational Therapy  Facility/Department: [facility]  Rehabilitation Occupational Therapy Daily Treatment Note    Date: 24  Patient Name: Maria E Valdez       Room: 0154/0154-01  MRN: [id number]  Account: [id number]   : 8/3/1934  (89 y.o.) Gender: female                  Past Medical History:  has a past medical history of Diabetes (HCC) and Osteoarthrosis.  Past Surgical History:   has a past surgical history that includes knee surgery.    Restrictions  Restrictions/Precautions: General Precautions, Fall Risk, Up as Tolerated, Bed Alarm  Other position/activity restrictions: fall risk,  Required Braces or Orthoses?: No    Subjective  Subjective: pleasant and agreeable, denies pain. pt with questions regarding discharge. verbalized understanding of current balance deficits and 24h/a recommendation at d/c.  Restrictions/Precautions: General Precautions;Fall Risk;Up as Tolerated;Bed Alarm           Objective     Cognition  Overall Cognitive Status: Exceptions  Arousal/Alertness: Delayed responses to stimuli  Following Commands: Follows one step commands with increased time;Follows one step commands with repetition  Attention Span: Attends with cues to redirect  Memory: Decreased short term memory;Decreased recall of recent events;Decreased long term memory  Safety Judgement: Decreased awareness of need for assistance;Decreased awareness of need for safety  Problem Solving: Assistance required to generate solutions;Assistance required to identify errors made;Assistance required to implement solutions;Decreased awareness of errors;Assistance required to correct errors made  Insights: Decreased awareness of deficits  Initiation: Requires cues for some  Sequencing: Requires cues for some  Orientation  Overall Orientation Status: Within Normal Limits  Orientation Level: Oriented to person;Oriented to time;Oriented to situation;Oriented to place         ADL  Feeding  Assistance Level: Set-up  Grooming/Oral

## 2024-04-13 NOTE — PLAN OF CARE
Problem: Safety - Adult  Goal: Free from fall injury  Outcome: Progressing  Flowsheets (Taken 4/13/2024 2572)  Free From Fall Injury:   Instruct family/caregiver on patient safety   Based on caregiver fall risk screen, instruct family/caregiver to ask for assistance with transferring infant if caregiver noted to have fall risk factors

## 2024-04-13 NOTE — PROGRESS NOTES
Neurology Progress Note    ID: Maria E Valdez is a 89 y.o. female    : 8/3/1934     LOS: 10 days     ASSESSMENT    1.  Frequent falls.  2.  Tremor.  3.  Chronic diabetes.  4.  S/p left total knee replacement.  5.  Chronic anticoagulation use with atrial fibrillation.  6.  Acute right pontine infarction.     The patient has evidence of tremor and mild degree of parkinsonism.  The CTA of head and neck showed no significant hemodynamic stenosis.  The CT brain also showed no evidence of acute infarction.  MRI brain showed acute right pontine infarction.     From neurology perspective, the etiology of the stroke is likely small vessel disease.    24: The patient is neurologically stabilized.  The patient denies worsening of weakness on her legs.  Blood pressure has been stable.    24: Patient was reported worsening of mentation.  The patient is alert and oriented during my assessment.  There is no worsening of weakness or numbness on during my assessment.  From the neurology perspective, this can be hospital delirium especially the patient is suspicious to have underlying dementia.    Plan:     1.  Continue Eliquis and statin.  2.  Continue aspirin.  3.  PT/OT/ST.  4.  The target blood pressure below 140/90.  5.  Avoid hypotension.  6.  Continue cardiac telemetry.  7.  Minimize sedation patient and anticholinergic medication.  8.  Urine exam and chest x-ray to exclude occult infection.      Medications:  Scheduled Meds:    insulin glargine  10 Units SubCUTAneous Nightly    magnesium oxide  400 mg Oral Daily    apixaban  2.5 mg Oral BID    aspirin  81 mg Oral Daily    atorvastatin  20 mg Oral Daily    buPROPion  300 mg Oral QAM    DULoxetine  60 mg Oral Daily    [Held by provider] furosemide  20 mg Oral Daily    insulin lispro  0-4 Units SubCUTAneous Nightly    insulin lispro  0-8 Units SubCUTAneous TID WC    [Held by provider] lisinopril  5 mg Oral Daily    metoprolol succinate  25 mg Oral Daily    [Held

## 2024-04-13 NOTE — PROGRESS NOTES
04/13/24 1503   Encounter Summary   Encounter Overview/Reason  Spiritual/Emotional Needs   Service Provided For: Patient   Referral/Consult From: Multi-disciplinary team   Support System Family members   Last Encounter  04/13/24  ( visited; patient stated she was okay and appreciated my stopping by.)

## 2024-04-14 LAB
ANION GAP SERPL CALCULATED.3IONS-SCNC: 12 MMOL/L (ref 3–16)
BASOPHILS # BLD: 0.1 K/UL (ref 0–0.2)
BASOPHILS NFR BLD: 0.7 %
BUN SERPL-MCNC: 27 MG/DL (ref 7–20)
CALCIUM SERPL-MCNC: 9.3 MG/DL (ref 8.3–10.6)
CHLORIDE SERPL-SCNC: 92 MMOL/L (ref 99–110)
CO2 SERPL-SCNC: 28 MMOL/L (ref 21–32)
CREAT SERPL-MCNC: 1.3 MG/DL (ref 0.6–1.2)
DEPRECATED RDW RBC AUTO: 16.6 % (ref 12.4–15.4)
EOSINOPHIL # BLD: 0.2 K/UL (ref 0–0.6)
EOSINOPHIL NFR BLD: 2.5 %
GFR SERPLBLD CREATININE-BSD FMLA CKD-EPI: 39 ML/MIN/{1.73_M2}
GLUCOSE BLD-MCNC: 144 MG/DL (ref 70–99)
GLUCOSE BLD-MCNC: 147 MG/DL (ref 70–99)
GLUCOSE BLD-MCNC: 200 MG/DL (ref 70–99)
GLUCOSE BLD-MCNC: 201 MG/DL (ref 70–99)
GLUCOSE SERPL-MCNC: 122 MG/DL (ref 70–99)
HCT VFR BLD AUTO: 40.6 % (ref 36–48)
HGB BLD-MCNC: 13.2 G/DL (ref 12–16)
LYMPHOCYTES # BLD: 1 K/UL (ref 1–5.1)
LYMPHOCYTES NFR BLD: 12.2 %
MCH RBC QN AUTO: 30.4 PG (ref 26–34)
MCHC RBC AUTO-ENTMCNC: 32.5 G/DL (ref 31–36)
MCV RBC AUTO: 93.6 FL (ref 80–100)
MONOCYTES # BLD: 0.6 K/UL (ref 0–1.3)
MONOCYTES NFR BLD: 7.8 %
NEUTROPHILS # BLD: 6.1 K/UL (ref 1.7–7.7)
NEUTROPHILS NFR BLD: 76.8 %
PERFORMED ON: ABNORMAL
PLATELET # BLD AUTO: 270 K/UL (ref 135–450)
PMV BLD AUTO: 8.3 FL (ref 5–10.5)
POTASSIUM SERPL-SCNC: 4.3 MMOL/L (ref 3.5–5.1)
RBC # BLD AUTO: 4.34 M/UL (ref 4–5.2)
SODIUM SERPL-SCNC: 132 MMOL/L (ref 136–145)
WBC # BLD AUTO: 7.9 K/UL (ref 4–11)

## 2024-04-14 PROCEDURE — 6370000000 HC RX 637 (ALT 250 FOR IP): Performed by: STUDENT IN AN ORGANIZED HEALTH CARE EDUCATION/TRAINING PROGRAM

## 2024-04-14 PROCEDURE — 36415 COLL VENOUS BLD VENIPUNCTURE: CPT

## 2024-04-14 PROCEDURE — 94640 AIRWAY INHALATION TREATMENT: CPT

## 2024-04-14 PROCEDURE — 80048 BASIC METABOLIC PNL TOTAL CA: CPT

## 2024-04-14 PROCEDURE — 85025 COMPLETE CBC W/AUTO DIFF WBC: CPT

## 2024-04-14 PROCEDURE — 2580000003 HC RX 258: Performed by: STUDENT IN AN ORGANIZED HEALTH CARE EDUCATION/TRAINING PROGRAM

## 2024-04-14 PROCEDURE — 1280000000 HC REHAB R&B

## 2024-04-14 RX ORDER — 0.9 % SODIUM CHLORIDE 0.9 %
1000 INTRAVENOUS SOLUTION INTRAVENOUS ONCE
Status: COMPLETED | OUTPATIENT
Start: 2024-04-14 | End: 2024-04-15

## 2024-04-14 RX ADMIN — BUPROPION HYDROCHLORIDE 300 MG: 150 TABLET, EXTENDED RELEASE ORAL at 09:00

## 2024-04-14 RX ADMIN — Medication 3 MG: at 21:13

## 2024-04-14 RX ADMIN — Medication 400 MG: at 09:00

## 2024-04-14 RX ADMIN — TIOTROPIUM BROMIDE AND OLODATEROL 2 PUFF: 3.124; 2.736 SPRAY, METERED RESPIRATORY (INHALATION) at 08:23

## 2024-04-14 RX ADMIN — ASPIRIN 81 MG 81 MG: 81 TABLET ORAL at 09:00

## 2024-04-14 RX ADMIN — SODIUM CHLORIDE 1000 ML: 9 INJECTION, SOLUTION INTRAVENOUS at 22:10

## 2024-04-14 RX ADMIN — INSULIN GLARGINE 10 UNITS: 100 INJECTION, SOLUTION SUBCUTANEOUS at 21:13

## 2024-04-14 RX ADMIN — SULFAMETHOXAZOLE AND TRIMETHOPRIM 20 ML: 200; 40 SUSPENSION ORAL at 21:13

## 2024-04-14 RX ADMIN — APIXABAN 2.5 MG: 2.5 TABLET, FILM COATED ORAL at 09:00

## 2024-04-14 RX ADMIN — METOPROLOL SUCCINATE 25 MG: 25 TABLET, FILM COATED, EXTENDED RELEASE ORAL at 08:59

## 2024-04-14 RX ADMIN — DULOXETINE HYDROCHLORIDE 60 MG: 60 CAPSULE, DELAYED RELEASE ORAL at 09:00

## 2024-04-14 RX ADMIN — SULFAMETHOXAZOLE AND TRIMETHOPRIM 20 ML: 200; 40 SUSPENSION ORAL at 11:45

## 2024-04-14 RX ADMIN — INSULIN LISPRO 2 UNITS: 100 INJECTION, SOLUTION INTRAVENOUS; SUBCUTANEOUS at 11:44

## 2024-04-14 RX ADMIN — APIXABAN 2.5 MG: 2.5 TABLET, FILM COATED ORAL at 21:13

## 2024-04-14 RX ADMIN — ATORVASTATIN CALCIUM 20 MG: 10 TABLET, FILM COATED ORAL at 09:00

## 2024-04-14 ASSESSMENT — PAIN SCALES - GENERAL: PAINLEVEL_OUTOF10: 0

## 2024-04-14 NOTE — PLAN OF CARE
Problem: Safety - Adult  Goal: Free from fall injury  4/14/2024 0003 by Cecily Levi, RN  Outcome: Progressing

## 2024-04-14 NOTE — PROGRESS NOTES
Maria E Valdez  4/13/2024  [id number]    Chief Complaint: Late effects of cerebral ischemic stroke    Subjective:   No acute events overnight. Today Jenn is seen in her room, resting in bed. She reports feeling ok. Per nursing she did better last night and yesterday. Neurology consult pending. Will push back her discharge date to ensure that she is doing better with therapy.     ROS: denies f/c, n/v, cp, sob    Objective:  Patient Vitals for the past 24 hrs:   BP Temp Temp src Pulse Resp SpO2   04/13/24 2000 117/76 97.2 °F (36.2 °C) Oral 89 18 95 %   04/13/24 1208 132/71 -- -- 82 -- 91 %   04/13/24 0913 119/71 98.3 °F (36.8 °C) Oral 90 18 94 %   04/13/24 0831 -- -- -- 80 16 95 %     Gen: No distress, pleasant.  HEENT: Normocephalic, atraumatic.   CV: RRR  Resp: No respiratory distress. Lungs CTAB  Abd: Soft, nondistended  Ext: No edema.   Neuro: Alert, oriented to self, speech fluent  Psych: appropriate mood and affect    Wt Readings from Last 3 Encounters:   04/12/24 76 kg (167 lb 8 oz)   04/03/24 84.6 kg (186 lb 8 oz)   05/18/19 88.5 kg (195 lb)       Laboratory data:   Lab Results   Component Value Date    WBC 9.7 04/12/2024    HGB 14.5 04/12/2024    HCT 45.7 04/12/2024    MCV 97.8 04/12/2024     04/12/2024       Lab Results   Component Value Date/Time     04/13/2024 05:49 PM    K 5.0 04/13/2024 05:49 PM    CL 86 04/13/2024 05:49 PM    CO2 29 04/13/2024 05:49 PM    BUN 33 04/13/2024 05:49 PM    CREATININE 1.4 04/13/2024 05:49 PM    GLUCOSE 205 04/13/2024 05:49 PM    CALCIUM 9.5 04/13/2024 05:49 PM        Therapy progress:  Physical therapy:  Bed Mobility:     Sit>supine:  Assistance Level: Independent  Skilled Clinical Factors: bed flat, no rails  Supine>sit:  Assistance Level: Independent  Skilled Clinical Factors: bed flat, no rails  Transfers:  Surface: To chair with arms, From chair with arms, Wheelchair  Sit>stand:  Assistance Level: Contact guard assist  Skilled Clinical Factors: with

## 2024-04-15 LAB
ANION GAP SERPL CALCULATED.3IONS-SCNC: 8 MMOL/L (ref 3–16)
BASOPHILS # BLD: 0 K/UL (ref 0–0.2)
BASOPHILS NFR BLD: 0.6 %
BUN SERPL-MCNC: 21 MG/DL (ref 7–20)
CALCIUM SERPL-MCNC: 9.1 MG/DL (ref 8.3–10.6)
CHLORIDE SERPL-SCNC: 95 MMOL/L (ref 99–110)
CO2 SERPL-SCNC: 28 MMOL/L (ref 21–32)
CREAT SERPL-MCNC: 1.2 MG/DL (ref 0.6–1.2)
DEPRECATED RDW RBC AUTO: 16.5 % (ref 12.4–15.4)
EOSINOPHIL # BLD: 0.2 K/UL (ref 0–0.6)
EOSINOPHIL NFR BLD: 2.2 %
GFR SERPLBLD CREATININE-BSD FMLA CKD-EPI: 43 ML/MIN/{1.73_M2}
GLUCOSE BLD-MCNC: 141 MG/DL (ref 70–99)
GLUCOSE BLD-MCNC: 147 MG/DL (ref 70–99)
GLUCOSE BLD-MCNC: 181 MG/DL (ref 70–99)
GLUCOSE BLD-MCNC: 191 MG/DL (ref 70–99)
GLUCOSE SERPL-MCNC: 152 MG/DL (ref 70–99)
HCT VFR BLD AUTO: 39.7 % (ref 36–48)
HGB BLD-MCNC: 13.1 G/DL (ref 12–16)
LYMPHOCYTES # BLD: 0.9 K/UL (ref 1–5.1)
LYMPHOCYTES NFR BLD: 11.5 %
MAGNESIUM SERPL-MCNC: 2.1 MG/DL (ref 1.8–2.4)
MCH RBC QN AUTO: 30.6 PG (ref 26–34)
MCHC RBC AUTO-ENTMCNC: 32.8 G/DL (ref 31–36)
MCV RBC AUTO: 93.2 FL (ref 80–100)
MONOCYTES # BLD: 0.6 K/UL (ref 0–1.3)
MONOCYTES NFR BLD: 7.2 %
NEUTROPHILS # BLD: 6.2 K/UL (ref 1.7–7.7)
NEUTROPHILS NFR BLD: 78.5 %
PERFORMED ON: ABNORMAL
PLATELET # BLD AUTO: 240 K/UL (ref 135–450)
PMV BLD AUTO: 7.9 FL (ref 5–10.5)
POTASSIUM SERPL-SCNC: 4.8 MMOL/L (ref 3.5–5.1)
RBC # BLD AUTO: 4.26 M/UL (ref 4–5.2)
SARS-COV-2 RDRP RESP QL NAA+PROBE: NOT DETECTED
SODIUM SERPL-SCNC: 131 MMOL/L (ref 136–145)
WBC # BLD AUTO: 7.9 K/UL (ref 4–11)

## 2024-04-15 PROCEDURE — 97535 SELF CARE MNGMENT TRAINING: CPT

## 2024-04-15 PROCEDURE — 80048 BASIC METABOLIC PNL TOTAL CA: CPT

## 2024-04-15 PROCEDURE — 97130 THER IVNTJ EA ADDL 15 MIN: CPT

## 2024-04-15 PROCEDURE — 6370000000 HC RX 637 (ALT 250 FOR IP): Performed by: STUDENT IN AN ORGANIZED HEALTH CARE EDUCATION/TRAINING PROGRAM

## 2024-04-15 PROCEDURE — 97110 THERAPEUTIC EXERCISES: CPT

## 2024-04-15 PROCEDURE — 97116 GAIT TRAINING THERAPY: CPT

## 2024-04-15 PROCEDURE — 97542 WHEELCHAIR MNGMENT TRAINING: CPT

## 2024-04-15 PROCEDURE — 83735 ASSAY OF MAGNESIUM: CPT

## 2024-04-15 PROCEDURE — 97530 THERAPEUTIC ACTIVITIES: CPT

## 2024-04-15 PROCEDURE — 87635 SARS-COV-2 COVID-19 AMP PRB: CPT

## 2024-04-15 PROCEDURE — 85025 COMPLETE CBC W/AUTO DIFF WBC: CPT

## 2024-04-15 PROCEDURE — 97129 THER IVNTJ 1ST 15 MIN: CPT

## 2024-04-15 PROCEDURE — 92526 ORAL FUNCTION THERAPY: CPT

## 2024-04-15 PROCEDURE — 36415 COLL VENOUS BLD VENIPUNCTURE: CPT

## 2024-04-15 PROCEDURE — 1280000000 HC REHAB R&B

## 2024-04-15 PROCEDURE — 94640 AIRWAY INHALATION TREATMENT: CPT

## 2024-04-15 RX ORDER — MAGNESIUM OXIDE 400 MG/1
400 TABLET ORAL DAILY
Qty: 30 TABLET | Refills: 0
Start: 2024-04-15

## 2024-04-15 RX ORDER — ASPIRIN 81 MG/1
81 TABLET ORAL DAILY
Qty: 30 TABLET | Refills: 0
Start: 2024-04-15

## 2024-04-15 RX ORDER — POLYETHYLENE GLYCOL 3350 17 G/17G
17 POWDER, FOR SOLUTION ORAL DAILY PRN
Qty: 510 G | Refills: 0
Start: 2024-04-15

## 2024-04-15 RX ORDER — SULFAMETHOXAZOLE AND TRIMETHOPRIM 200; 40 MG/5ML; MG/5ML
160 SUSPENSION ORAL EVERY 12 HOURS
Qty: 135 ML | Refills: 0
Start: 2024-04-16 | End: 2024-04-16 | Stop reason: HOSPADM

## 2024-04-15 RX ADMIN — TIOTROPIUM BROMIDE AND OLODATEROL 2 PUFF: 3.124; 2.736 SPRAY, METERED RESPIRATORY (INHALATION) at 07:19

## 2024-04-15 RX ADMIN — DULOXETINE HYDROCHLORIDE 60 MG: 60 CAPSULE, DELAYED RELEASE ORAL at 09:31

## 2024-04-15 RX ADMIN — ATORVASTATIN CALCIUM 20 MG: 10 TABLET, FILM COATED ORAL at 09:31

## 2024-04-15 RX ADMIN — ASPIRIN 81 MG 81 MG: 81 TABLET ORAL at 09:31

## 2024-04-15 RX ADMIN — APIXABAN 2.5 MG: 2.5 TABLET, FILM COATED ORAL at 19:54

## 2024-04-15 RX ADMIN — Medication 400 MG: at 09:31

## 2024-04-15 RX ADMIN — APIXABAN 2.5 MG: 2.5 TABLET, FILM COATED ORAL at 09:31

## 2024-04-15 RX ADMIN — BUPROPION HYDROCHLORIDE 300 MG: 150 TABLET, EXTENDED RELEASE ORAL at 09:31

## 2024-04-15 RX ADMIN — Medication 3 MG: at 19:54

## 2024-04-15 RX ADMIN — SULFAMETHOXAZOLE AND TRIMETHOPRIM 20 ML: 200; 40 SUSPENSION ORAL at 10:58

## 2024-04-15 RX ADMIN — INSULIN GLARGINE 10 UNITS: 100 INJECTION, SOLUTION SUBCUTANEOUS at 19:54

## 2024-04-15 RX ADMIN — METOPROLOL SUCCINATE 25 MG: 25 TABLET, FILM COATED, EXTENDED RELEASE ORAL at 09:31

## 2024-04-15 ASSESSMENT — PAIN SCALES - GENERAL
PAINLEVEL_OUTOF10: 0
PAINLEVEL_OUTOF10: 0

## 2024-04-15 NOTE — PROGRESS NOTES
[facility]: ACUTE REHAB UNIT  SPEECH-LANGUAGE PATHOLOGY      [x] Daily  [] Weekly Care Conference Note  [] Discharge    Patient:Maria E Valdez      :8/3/1934  MRN:[id number]  Rehab Dx/Hx: Late effects of cerebral ischemic stroke [I69.30]    Precautions: falls and aspirations  Home situation: [facility] Dx: [] Aphasia  [] Dysarthria  [] Apraxia   [x] Oropharyngeal dysphagia [x] Cognitive Impairment  [] Other:   Date of Admit: 4/3/2024  Room #: 0154/0154-01    Current functional status (updated daily):         Pt being seen for : [] Speech/Language Treatment  [x] Dysphagia Treatment [x] Cognitive Treatment  [] Other:  Communication: [x]WFL  [] Aphasia  [] Dysarthria  [] Apraxia  [] Pragmatic Impairment [] Non-verbal  [] Hearing Loss  [] Other:   Cognition: [] WFL  [] Mild  [x] Moderate  [x] Severe [] Unable to Assess  [] Other:  Memory: [] WFL  [] Mild  [] Moderate  [x] Severe [] Unable to Assess  [] Other:  Behavior: [x] Alert  [x] Cooperative  [x]  Pleasant  [] Confused  [] Agitated  [] Uncooperative  [] Distractible [] Motivated  [] Self-Limiting [] Anxious  [] Other:  Endurance:  [x] Adequate for participation in SLP sessions  [] Reduced overall  [] Lethargic  [] Other:  Safety: [] No concerns at this time  [x] Reduced insight into deficits  [x]  Reduced safety awareness [] Not following call light procedures  [] Unable to Assess  [] Other:    Current Diet Order:ADULT DIET; Regular; 4 carb choices (60 gm/meal)  ADULT ORAL NUTRITION SUPPLEMENT; Dinner; Diabetic Oral Supplement  Swallowing Precautions: Sit up for all meals and thereafter for 30 minutes and Eat with small bites (1/2 tsp; 1 tsp)      Date: 4/15/2024      Tx session 1  [id number] Discharge Note   Total Timed Code Min 50 0   Total Treatment Minutes 60 0   Individual Treatment Minutes 60 0   Group Treatment Minutes 0 0   Co-Treat Minutes 0 0   Variance/Reason:  NA 0   Pain Denies pain    Pain Intervention [] RN notified  []

## 2024-04-15 NOTE — DISCHARGE INSTR - COC
Dizziness R42    Elevated troponin R79.89    Frequent falls R29.6    Stroke-like symptoms R29.90    Acute on chronic combined systolic and diastolic CHF (congestive heart failure) ([facility]) I50.43    Permanent atrial fibrillation ([facility]) I48.21    Hypercholesterolemia E78.00    Head injury S09.90XA    Anticoagulated by anticoagulation treatment Z79.01    Fall W19.XXXA    Cerebrovascular accident (CVA) ([facility]) I63.9    Late effects of cerebral ischemic stroke I69.30       Isolation/Infection:   Isolation            No Isolation          Patient Infection Status       None to display            Nurse Assessment:  Last Vital Signs: BP (!) 148/87   Pulse 87   Temp 97.3 °F (36.3 °C) (Oral)   Resp 18   Ht 1.6 m (5' 2.99\")   Wt 88.5 kg (195 lb 1.7 oz)   SpO2 95%   BMI 34.57 kg/m²     Last documented pain score (0-10 scale): Pain Level: 0  Last Weight:   Wt Readings from Last 1 Encounters:   04/15/24 88.5 kg (195 lb 1.7 oz)     Mental Status:  {IP PT MENTAL STATUS:20030}    IV Access:  { WOODY IV ACCESS:894311406}    Nursing Mobility/ADLs:  Walking   {CHP DME ADLs:006703895}  Transfer  {CHP DME ADLs:931669738}  Bathing  {CHP DME ADLs:814547766}  Dressing  {CHP DME ADLs:952653522}  Toileting  {CHP DME ADLs:633473612}  Feeding  {CHP DME ADLs:995970226}  Med Admin  {P DME ADLs:015211661}  Med Delivery   {[facility] MED Delivery:960074550}    Wound Care Documentation and Therapy:  Wound 04/01/24 Thigh Left;Posterior (Active)   Wound Etiology Skin Tear 04/14/24 2213   Dressing Status Clean;Dry;Intact 04/12/24 2103   Wound Cleansed Not Cleansed 04/11/24 1159   Dressing/Treatment Open to air 04/14/24 2213   Wound Assessment Pink/red 04/13/24 2015   Drainage Amount None (dry) 04/14/24 2213   Number of days: 14        Elimination:  Continence:   Bowel: {YES / NO:19727}  Bladder: {YES / NO:19727}  Urinary Catheter: {Urinary Catheter:744851496}   Colostomy/Ileostomy/Ileal Conduit: {YES / NO:19727}       Date of Last BM:

## 2024-04-15 NOTE — PROGRESS NOTES
parts  Assistance Level for Propulsion: Minimal assistance  Propulsion Method: Bilateral lower extremities;Bilateral upper extremities  Propulsion Quality: Slow velocity;Short strokes;Decreased fluidity  Propulsion Distance: 150'  Skilled Clinical Factors: requires grossly CGA to Betty to maintain linear progression, difficulty with sequencing of task                    ASSESSMENT/PROGRESS TOWARDS GOALS  Vital Signs  Pulse: 85  Heart Rate Source: Monitor  BP: 136/72  BP Location: Right upper arm  MAP (Calculated): 93  SpO2: 96 %  O2 Device: None (Room air)    Assessment  Assessment: Pt seen in pm for PT tx, pt overall requires increased assist for mobility, appears more confused and increased difficulty with sequencing of tasks. Pt requires Sup for bed mobility, grossly CGA for t/f with one episode of Betty from EOM. Pt requires initial Betty for gait but does improve to CGA with progression of session. Pt requires CGA to Betty for step ups and Betty for w/c mobility. Pt is limited by cognition, balance, activity tolerance, sequencing, motor control and planning. D/t pt's current deficits recommend SNF at d/c.  Activity Tolerance: Patient limited by fatigue;Treatment limited secondary to decreased cognition  Discharge Recommendations: Subacute/Skilled Nursing Facility  PT Equipment Recommendations  Equipment Needed: Yes  Mobility Devices: Walker  Walker: Rolling  Other: RW    Goals  Patient Goals   Patient Goals : \"to walk by myself and go home\"  Short Term Goals  Time Frame for Short Term Goals: 6 days (4/8/24)  Short Term Goal 1: pt will complete bed mobility with mod-ind- GOAL MET mod ind  Short Term Goal 2: pt will perform transfers with LRAD and supv- GOAL NOT MET< CGA  Short Term Goal 3: pt will ambulate 100 ft with LRAD and SBA- GOAL NOT MET< CGA-min A  Short Term Goal 4: pt will perform 4 steps with hand rails and SBA- GOAL NOT MET, CGA for safety 2* mild unsteadiness  Long Term Goals  Time Frame for Long Term

## 2024-04-15 NOTE — PROGRESS NOTES
Physician Progress Note      PATIENT:               CATHY LOPEZ  [facility] #:                  [id number]  :                       8/3/1934  ADMIT DATE:       3/30/2024 12:51 PM  DISCH DATE:        4/3/2024 3:41 PM  RESPONDING  PROVIDER #:        LISA NEUMANN          QUERY TEXT:    Pt admitted s/p fall. Pt noted to have \"Frequent falls over past month or two   possibly 2/2 diabetic polyneuropathy, h/o severe L3-4 and L4-5 lumbar   stenosis, and dizziness\" and also noted a new acute CVA. If possible, please   document in progress notes and discharge summary the relationship, if any,   between frequent falls and CVA.    The medical record reflects the following:  Risk Factors: CVA, diabetic polyneuropathy, h/o severe L3-4 and L4-5 lumbar   stenosis, advanced age  Clinical Indicators: 4-5 falls over the last 5 days, spinning sensation and   unsteadiness prior to the fall, MRI revealed acute right pontine infarction  Treatment: MRI, Neuro consult, CT head, PT/ OT, supportive care    Thank you,  Trixie Shi RN, CDS  [email or web address]  Options provided:  -- Frequent falls and dizziness due multifactorial causes of  diabetic   polyneuropathy, h/o severe L3-4 and L4-5 lumbar stenosis and CVA  -- Frequent falls and dizziness unrelated to CVA  -- Other - I will add my own diagnosis  -- Disagree - Not applicable / Not valid  -- Disagree - Clinically unable to determine / Unknown  -- Refer to Clinical Documentation Reviewer    PROVIDER RESPONSE TEXT:    This patient has frequent falls and dizziness due to multifactorial causes of   diabetic polyneuropathy, h/o severe L3-4 and L4-5 lumbar stenosis and CVA.    Query created by: Trixie Shi on 2024 7:16 AM      Electronically signed by:  LISA NEUMANN 4/15/2024 4:37 PM

## 2024-04-15 NOTE — CONSULTS
Palliative Care Initial Note  Palliative Care Admit date:  4/15/24    ACP/[facility] referral noted.   F/u d/w ARU CM and this order is actually showing up in error.  Pt will d/c for SNF and [facility] is asked to forego involvement.

## 2024-04-15 NOTE — PROGRESS NOTES
Maria E Valdez  4/15/2024  [id number]    Chief Complaint: Late effects of cerebral ischemic stroke    Subjective:   No acute events overnight. Today Jenn is seen in her room. She reports feeling well and denies acute complaints at this time. She is being treated for a UTI. Cr and Na are looking better.     Attempted to call patient's granddaughter. Left voicemail. Will try to call again tomorrow.     ROS: denies f/c, n/v, cp, sob    Objective:  Patient Vitals for the past 24 hrs:   BP Temp Temp src Pulse Resp SpO2 Weight   04/15/24 0915 (!) 148/87 97.3 °F (36.3 °C) Oral 87 18 95 % --   04/15/24 0722 -- -- -- -- -- 93 % --   04/15/24 0603 -- -- -- -- -- -- 88.5 kg (195 lb 1.7 oz)   04/14/24 2100 133/85 98 °F (36.7 °C) Oral 77 18 92 % --     Gen: No distress, pleasant.  HEENT: Normocephalic, atraumatic.   CV: RRR  Resp: No respiratory distress. Lungs CTAB  Abd: Soft, nondistended  Ext: No edema.   Neuro: Alert, oriented to self, speech fluent  Psych: appropriate mood and affect    Wt Readings from Last 3 Encounters:   04/15/24 88.5 kg (195 lb 1.7 oz)   04/03/24 84.6 kg (186 lb 8 oz)   05/18/19 88.5 kg (195 lb)       Laboratory data:   Lab Results   Component Value Date    WBC 7.9 04/15/2024    HGB 13.1 04/15/2024    HCT 39.7 04/15/2024    MCV 93.2 04/15/2024     04/15/2024       Lab Results   Component Value Date/Time     04/15/2024 05:59 AM    K 4.8 04/15/2024 05:59 AM    K 4.3 04/14/2024 09:05 AM    CL 95 04/15/2024 05:59 AM    CO2 28 04/15/2024 05:59 AM    BUN 21 04/15/2024 05:59 AM    CREATININE 1.2 04/15/2024 05:59 AM    GLUCOSE 152 04/15/2024 05:59 AM    CALCIUM 9.1 04/15/2024 05:59 AM        Therapy progress:  Physical therapy:  Bed Mobility:     Sit>supine:  Assistance Level: Independent  Skilled Clinical Factors: bed flat, no rails  Supine>sit:  Assistance Level: Independent  Skilled Clinical Factors: bed flat, no rails  Transfers:  Surface: To chair with arms, From chair with arms,

## 2024-04-15 NOTE — PROGRESS NOTES
Maria E Valdez  4/14/2024  [id number]    Chief Complaint: Late effects of cerebral ischemic stroke    Subjective:   Discussed findings of hyponatremia and UTI, all questions answered.    ROS: denies f/c, n/v, cp, sob    Objective:  Patient Vitals for the past 24 hrs:   BP Temp Temp src Pulse Resp SpO2 Weight   04/14/24 2100 133/85 98 °F (36.7 °C) Oral 77 18 92 % --   04/14/24 0900 (!) 141/67 97.5 °F (36.4 °C) Oral 79 18 97 % --   04/14/24 0529 -- -- -- -- -- -- 81.2 kg (179 lb)     Gen: No distress, pleasant.  HEENT: Normocephalic, atraumatic.   CV: RRR  Resp: No respiratory distress. Lungs CTAB  Abd: Soft, nondistended  Ext: No edema.   Neuro: Alert, oriented to self, speech fluent  Psych: appropriate mood and affect    Wt Readings from Last 3 Encounters:   04/14/24 81.2 kg (179 lb)   04/03/24 84.6 kg (186 lb 8 oz)   05/18/19 88.5 kg (195 lb)       Laboratory data:   Lab Results   Component Value Date    WBC 7.9 04/14/2024    HGB 13.2 04/14/2024    HCT 40.6 04/14/2024    MCV 93.6 04/14/2024     04/14/2024       Lab Results   Component Value Date/Time     04/14/2024 09:05 AM    K 4.3 04/14/2024 09:05 AM    CL 92 04/14/2024 09:05 AM    CO2 28 04/14/2024 09:05 AM    BUN 27 04/14/2024 09:05 AM    CREATININE 1.3 04/14/2024 09:05 AM    GLUCOSE 122 04/14/2024 09:05 AM    CALCIUM 9.3 04/14/2024 09:05 AM        Therapy progress:  Physical therapy:  Bed Mobility:     Sit>supine:  Assistance Level: Independent  Skilled Clinical Factors: bed flat, no rails  Supine>sit:  Assistance Level: Independent  Skilled Clinical Factors: bed flat, no rails  Transfers:  Surface: To chair with arms, From chair with arms, Wheelchair  Sit>stand:  Assistance Level: Contact guard assist  Skilled Clinical Factors: with RW  Stand>sit:  Assistance Level: Stand by assist  Skilled Clinical Factors: with RW, cues for safely aligning self with chairs and reaching back prior to sitting  Bed<>chair  Assistance Level: Contact guard

## 2024-04-15 NOTE — PROGRESS NOTES
and AE- MET 4/09  Short Term Goal 3: Pt will tolerate standing at sink to complete 1-2 grooming tasks with SBA- MET 4/109 pt completed 1 grooming task standing with SBA  Short Term Goal 4: Pt will complete simple IADL with SBA- MET 4/11 pt folded laundry ~8 mins  Short Term Goal 5: Pt will tolerate x20 BUE ther ex- MET 4/09  Long Term Goals  Time Frame for Long Term Goals : 10 days (4/12/24)-  Long Term Goal 1: Pt will complete toilet/functional transfer mod I - not met Pt  SBA-min A for transfers 4/15/24  Long Term Goal 2: Pt will complete TB dressing mod I not met  4/15/24  Long Term Goal 3: Pt will tolerate standing for 5 mins to inc  ADL participation mod I- met 4/11 Pt stood for 8 minutes for IADL task  Long Term Goal 4: Pt will complete simple IADL with mod I not met Pt SBA for IADLs and dynamic balance 4/13/24      Therapy Time   Individual Concurrent Group Co-treatment   Time In 0930         Time Out 1100         Minutes 90         Timed Code Treatment Minutes: 90 Minutes       Calos Ratliff, OT

## 2024-04-15 NOTE — CARE COORDINATION
JUANY spoke to Zhanna (granddaughter) via telephone updating her on change of discharge date from 04/15 to 04/16 and that [facility] and explained that the patient has some days left for Medicare and that the patients 2ndary will cover any co-pays. JUANY explained that Dr. Serrano will contact her later today for medical updates.    Citlali Thompson RN

## 2024-04-16 ENCOUNTER — HOSPITAL ENCOUNTER (INPATIENT)
Age: 89
LOS: 5 days | Discharge: NURSING, REHAB OR LONG-TERM CARE | DRG: 640 | End: 2024-04-21
Attending: INTERNAL MEDICINE | Admitting: INTERNAL MEDICINE
Payer: MEDICARE

## 2024-04-16 ENCOUNTER — APPOINTMENT (OUTPATIENT)
Dept: GENERAL RADIOLOGY | Age: 89
DRG: 640 | End: 2024-04-16
Attending: INTERNAL MEDICINE
Payer: MEDICARE

## 2024-04-16 VITALS
SYSTOLIC BLOOD PRESSURE: 157 MMHG | DIASTOLIC BLOOD PRESSURE: 99 MMHG | HEIGHT: 63 IN | TEMPERATURE: 97.4 F | OXYGEN SATURATION: 93 % | RESPIRATION RATE: 18 BRPM | HEART RATE: 85 BPM | BODY MASS INDEX: 31.45 KG/M2 | WEIGHT: 177.5 LBS

## 2024-04-16 PROBLEM — G93.40 ACUTE ENCEPHALOPATHY: Status: ACTIVE | Noted: 2024-04-16

## 2024-04-16 LAB
ANION GAP SERPL CALCULATED.3IONS-SCNC: 11 MMOL/L (ref 3–16)
BUN SERPL-MCNC: 16 MG/DL (ref 7–20)
CALCIUM SERPL-MCNC: 9.6 MG/DL (ref 8.3–10.6)
CHLORIDE SERPL-SCNC: 95 MMOL/L (ref 99–110)
CO2 SERPL-SCNC: 26 MMOL/L (ref 21–32)
CREAT SERPL-MCNC: 1 MG/DL (ref 0.6–1.2)
EKG ATRIAL RATE: 70 BPM
EKG DIAGNOSIS: NORMAL
EKG Q-T INTERVAL: 354 MS
EKG QRS DURATION: 108 MS
EKG QTC CALCULATION (BAZETT): 389 MS
EKG R AXIS: -27 DEGREES
EKG T AXIS: 243 DEGREES
EKG VENTRICULAR RATE: 73 BPM
GFR SERPLBLD CREATININE-BSD FMLA CKD-EPI: 54 ML/MIN/{1.73_M2}
GLUCOSE BLD-MCNC: 135 MG/DL (ref 70–99)
GLUCOSE BLD-MCNC: 139 MG/DL (ref 70–99)
GLUCOSE BLD-MCNC: 168 MG/DL (ref 70–99)
GLUCOSE BLD-MCNC: 174 MG/DL (ref 70–99)
GLUCOSE SERPL-MCNC: 145 MG/DL (ref 70–99)
PERFORMED ON: ABNORMAL
POTASSIUM SERPL-SCNC: 4.7 MMOL/L (ref 3.5–5.1)
SODIUM SERPL-SCNC: 132 MMOL/L (ref 136–145)

## 2024-04-16 PROCEDURE — 6370000000 HC RX 637 (ALT 250 FOR IP): Performed by: STUDENT IN AN ORGANIZED HEALTH CARE EDUCATION/TRAINING PROGRAM

## 2024-04-16 PROCEDURE — 36415 COLL VENOUS BLD VENIPUNCTURE: CPT

## 2024-04-16 PROCEDURE — G0378 HOSPITAL OBSERVATION PER HR: HCPCS

## 2024-04-16 PROCEDURE — 80048 BASIC METABOLIC PNL TOTAL CA: CPT

## 2024-04-16 PROCEDURE — 93005 ELECTROCARDIOGRAM TRACING: CPT | Performed by: INTERNAL MEDICINE

## 2024-04-16 PROCEDURE — 2580000003 HC RX 258: Performed by: INTERNAL MEDICINE

## 2024-04-16 PROCEDURE — 6370000000 HC RX 637 (ALT 250 FOR IP): Performed by: INTERNAL MEDICINE

## 2024-04-16 PROCEDURE — 94640 AIRWAY INHALATION TREATMENT: CPT

## 2024-04-16 PROCEDURE — G0379 DIRECT REFER HOSPITAL OBSERV: HCPCS

## 2024-04-16 PROCEDURE — 71045 X-RAY EXAM CHEST 1 VIEW: CPT

## 2024-04-16 PROCEDURE — 1200000000 HC SEMI PRIVATE

## 2024-04-16 PROCEDURE — 93010 ELECTROCARDIOGRAM REPORT: CPT | Performed by: INTERNAL MEDICINE

## 2024-04-16 RX ORDER — SODIUM CHLORIDE 0.9 % (FLUSH) 0.9 %
5-40 SYRINGE (ML) INJECTION EVERY 12 HOURS SCHEDULED
Status: DISCONTINUED | OUTPATIENT
Start: 2024-04-16 | End: 2024-04-21 | Stop reason: HOSPADM

## 2024-04-16 RX ORDER — ACETAMINOPHEN 500 MG
1000 TABLET ORAL EVERY 6 HOURS PRN
Status: DISCONTINUED | OUTPATIENT
Start: 2024-04-16 | End: 2024-04-21 | Stop reason: HOSPADM

## 2024-04-16 RX ORDER — INSULIN LISPRO 100 [IU]/ML
0-4 INJECTION, SOLUTION INTRAVENOUS; SUBCUTANEOUS
Status: DISCONTINUED | OUTPATIENT
Start: 2024-04-16 | End: 2024-04-21 | Stop reason: HOSPADM

## 2024-04-16 RX ORDER — DEXTROSE MONOHYDRATE 100 MG/ML
INJECTION, SOLUTION INTRAVENOUS CONTINUOUS PRN
Status: DISCONTINUED | OUTPATIENT
Start: 2024-04-16 | End: 2024-04-21 | Stop reason: HOSPADM

## 2024-04-16 RX ORDER — ONDANSETRON 4 MG/1
4 TABLET, ORALLY DISINTEGRATING ORAL EVERY 8 HOURS PRN
Status: DISCONTINUED | OUTPATIENT
Start: 2024-04-16 | End: 2024-04-21 | Stop reason: HOSPADM

## 2024-04-16 RX ORDER — CIPROFLOXACIN 500 MG/1
500 TABLET, FILM COATED ORAL EVERY 12 HOURS SCHEDULED
Qty: 9 TABLET | Refills: 0 | Status: ON HOLD
Start: 2024-04-16 | End: 2024-04-21 | Stop reason: HOSPADM

## 2024-04-16 RX ORDER — DULOXETIN HYDROCHLORIDE 20 MG/1
20 CAPSULE, DELAYED RELEASE ORAL DAILY
Status: DISCONTINUED | OUTPATIENT
Start: 2024-04-17 | End: 2024-04-21 | Stop reason: HOSPADM

## 2024-04-16 RX ORDER — INSULIN LISPRO 100 [IU]/ML
0-4 INJECTION, SOLUTION INTRAVENOUS; SUBCUTANEOUS NIGHTLY
Status: DISCONTINUED | OUTPATIENT
Start: 2024-04-16 | End: 2024-04-21 | Stop reason: HOSPADM

## 2024-04-16 RX ORDER — METOPROLOL SUCCINATE 25 MG/1
25 TABLET, EXTENDED RELEASE ORAL DAILY
Status: DISCONTINUED | OUTPATIENT
Start: 2024-04-17 | End: 2024-04-21 | Stop reason: HOSPADM

## 2024-04-16 RX ORDER — ALBUTEROL SULFATE 90 UG/1
2 AEROSOL, METERED RESPIRATORY (INHALATION) EVERY 6 HOURS PRN
Status: DISCONTINUED | OUTPATIENT
Start: 2024-04-16 | End: 2024-04-21 | Stop reason: HOSPADM

## 2024-04-16 RX ORDER — ONDANSETRON 2 MG/ML
4 INJECTION INTRAMUSCULAR; INTRAVENOUS EVERY 6 HOURS PRN
Status: DISCONTINUED | OUTPATIENT
Start: 2024-04-16 | End: 2024-04-21 | Stop reason: HOSPADM

## 2024-04-16 RX ORDER — ATORVASTATIN CALCIUM 10 MG/1
20 TABLET, FILM COATED ORAL DAILY
Status: DISCONTINUED | OUTPATIENT
Start: 2024-04-17 | End: 2024-04-21 | Stop reason: HOSPADM

## 2024-04-16 RX ORDER — ASPIRIN 81 MG/1
81 TABLET ORAL DAILY
Status: DISCONTINUED | OUTPATIENT
Start: 2024-04-17 | End: 2024-04-21 | Stop reason: HOSPADM

## 2024-04-16 RX ORDER — SODIUM CHLORIDE 0.9 % (FLUSH) 0.9 %
5-40 SYRINGE (ML) INJECTION PRN
Status: DISCONTINUED | OUTPATIENT
Start: 2024-04-16 | End: 2024-04-21 | Stop reason: HOSPADM

## 2024-04-16 RX ORDER — BUPROPION HYDROCHLORIDE 150 MG/1
300 TABLET ORAL EVERY MORNING
Status: DISCONTINUED | OUTPATIENT
Start: 2024-04-17 | End: 2024-04-21 | Stop reason: HOSPADM

## 2024-04-16 RX ORDER — POLYETHYLENE GLYCOL 3350 17 G/17G
17 POWDER, FOR SOLUTION ORAL DAILY PRN
Status: DISCONTINUED | OUTPATIENT
Start: 2024-04-16 | End: 2024-04-21 | Stop reason: HOSPADM

## 2024-04-16 RX ORDER — INSULIN GLARGINE 100 [IU]/ML
10 INJECTION, SOLUTION SUBCUTANEOUS NIGHTLY
Status: DISCONTINUED | OUTPATIENT
Start: 2024-04-16 | End: 2024-04-18

## 2024-04-16 RX ORDER — CIPROFLOXACIN 500 MG/1
500 TABLET, FILM COATED ORAL EVERY 12 HOURS SCHEDULED
Status: ACTIVE | OUTPATIENT
Start: 2024-04-16 | End: 2024-04-19

## 2024-04-16 RX ORDER — CALCIUM CARBONATE 500 MG/1
1 TABLET, CHEWABLE ORAL
Status: DISCONTINUED | OUTPATIENT
Start: 2024-04-16 | End: 2024-04-21 | Stop reason: HOSPADM

## 2024-04-16 RX ORDER — CIPROFLOXACIN 500 MG/1
500 TABLET, FILM COATED ORAL EVERY 12 HOURS SCHEDULED
Status: DISCONTINUED | OUTPATIENT
Start: 2024-04-16 | End: 2024-04-16 | Stop reason: HOSPADM

## 2024-04-16 RX ORDER — SODIUM CHLORIDE 9 MG/ML
INJECTION, SOLUTION INTRAVENOUS PRN
Status: DISCONTINUED | OUTPATIENT
Start: 2024-04-16 | End: 2024-04-21 | Stop reason: HOSPADM

## 2024-04-16 RX ADMIN — DULOXETINE HYDROCHLORIDE 60 MG: 60 CAPSULE, DELAYED RELEASE ORAL at 09:12

## 2024-04-16 RX ADMIN — ASPIRIN 81 MG 81 MG: 81 TABLET ORAL at 09:12

## 2024-04-16 RX ADMIN — CIPROFLOXACIN HYDROCHLORIDE 500 MG: 500 TABLET, FILM COATED ORAL at 09:12

## 2024-04-16 RX ADMIN — Medication 400 MG: at 09:12

## 2024-04-16 RX ADMIN — ATORVASTATIN CALCIUM 20 MG: 10 TABLET, FILM COATED ORAL at 09:11

## 2024-04-16 RX ADMIN — CIPROFLOXACIN 500 MG: 500 TABLET, FILM COATED ORAL at 21:21

## 2024-04-16 RX ADMIN — APIXABAN 2.5 MG: 2.5 TABLET, FILM COATED ORAL at 21:21

## 2024-04-16 RX ADMIN — METOPROLOL SUCCINATE 25 MG: 25 TABLET, FILM COATED, EXTENDED RELEASE ORAL at 09:12

## 2024-04-16 RX ADMIN — INSULIN GLARGINE 10 UNITS: 100 INJECTION, SOLUTION SUBCUTANEOUS at 21:22

## 2024-04-16 RX ADMIN — Medication 10 ML: at 21:21

## 2024-04-16 RX ADMIN — APIXABAN 2.5 MG: 2.5 TABLET, FILM COATED ORAL at 09:12

## 2024-04-16 RX ADMIN — TIOTROPIUM BROMIDE AND OLODATEROL 2 PUFF: 3.124; 2.736 SPRAY, METERED RESPIRATORY (INHALATION) at 08:56

## 2024-04-16 RX ADMIN — BUPROPION HYDROCHLORIDE 300 MG: 150 TABLET, EXTENDED RELEASE ORAL at 09:12

## 2024-04-16 ASSESSMENT — PAIN SCALES - GENERAL
PAINLEVEL_OUTOF10: 0
PAINLEVEL_OUTOF10: 0

## 2024-04-16 NOTE — CARE COORDINATION
Discharge cancelled d/t needing additional w/u on the [place] [facility] side. Hosp MD consulted awaiting transfer/discharge. Angie cordova/Charu Bautista notified about Dc cancelled and her following. Hens  completed see prog note.         Case Management Discharge Summary  [facility] - Acute Rehab Unit    Patient:Maria E Valdez     Rehab Dx/Hx: Late effects of cerebral ischemic stroke [I69.30]       Today's Date: 4/16/2024    Discharge to:  [facility]   Pre-certification completed:  [x] No       [] Yes     [] N/A   Hospital Exemption Notification (HENS) completed:  [x] No       [] Yes     [] N/A   IMM given:  04/15/24       New Durable Medical Equipment ordered/agency:  Defer to SNF   Transportation:  Medical Transport explained to pt/family. Pt/family voice no agency preference.    Agency used: [facility]   time: 11:00 am  Ambulance form completed: [] No       [x] Yes   [] N/A       Confirmed discharge plan with:  Patient: [] No       [x] Yes  Family:  [] No       [x] Yes      Name:GERALDO  Contact number: [phone]  Facility/Agency, name:    [facility]  Skilled Nursing  [address] [place] [address]  [phone]   WOODY/AVS faxed    Phone number for report to facility: [phone]    RN, name: Richie       Has lack of transportation kept you from medical appointments, meetings, work, or ADL's? [] Yes, it has kept me from medical appointments or from getting my meds  [] Yes, It has kept me from non-medical meetings, appointments, work, or getting things I need  [x] No  [] Pt unable to respond  [] Pt declines to respond     How often do you need to have someone help you when you read instructions, pamphlets, or other written material from your doctor or pharmacy? [] Never                             [] Always  [] Rarely                            [] Patient declines to respond  [x] Sometimes                    [] Patient unable to respond  [] Often       Note: Discharging nurse to

## 2024-04-16 NOTE — DISCHARGE SUMMARY
Physical Medicine & Rehabilitation  Discharge Summary     Patient Identification:  Maria E Valdez  : 8/3/1934  Admit date: 4/3/2024  Discharge date: 2024   Attending provider: No att. providers found        Primary care provider: Moises Winslow MD     Discharge Diagnoses:   Active Hospital Problems    Diagnosis     Late effects of cerebral ischemic stroke [I69.30]      History of Present Illness/Acute Hospital Course:  Maria E Valdez is an 89 year old female with a past medical history significant for permanent atrial fibrillation on AC, COPD, HFrEF, DM, HTN, and OA who presented to [facility] on 3/30/24 with dizziness and falls. Cardiology and Neurology were consulted. MRI brain revealed acute dorsal right jm CVA. Urology was consulted due to urinary retention and pollack was placed. Voiding trial prior to admission. She was admitted to [facility] on 4/3/24 due to functional deficits below her baseline. Today Jenn is seen in her room, resting in bed. She reports continued impaired balance. She denies focal weakness, numbness, or tingling. Her last bowel movement was yesterday. She denies any urinary complaints.     Inpatient Rehabilitation Course:   Maria E Valdez is a 89 y.o. female admitted to inpatient rehabilitation on 4/3/2024 with Late effects of cerebral ischemic stroke.  The patient participated in an aggressive multidisciplinary inpatient rehabilitation program involving 3 hours of therapy per day, at least 5 days per week.     Acute Discharge:  Patient with decline in therapies late last week. Appearing more confused and with poor PO intake. She developed an SARANYA which improved with holding lasix and giving IVF. She was started on abx due to concern for UTI. No improvement in mentation or PO intake. CT head was negative for acute process. Neurology was consulted. Patient had been set to discharge to SNF , but appeared more confused and was not eating. Medicine consulted for admission back to acute

## 2024-04-16 NOTE — PLAN OF CARE
Fall precautions in place, bed alarm on, nonskid foot wear applied, bed in lowest position, wheels locked and call light/personal items within reach. Pt agreeable to contact staff for assistance. Reviewed fall interventions with patient this shift, verbalized understanding. Patient free from falls this shift, will continue to monitor.

## 2024-04-16 NOTE — PLAN OF CARE
Problem: Chronic Conditions and Co-morbidities  Goal: Patient's chronic conditions and co-morbidity symptoms are monitored and maintained or improved  Outcome: Progressing     Problem: Safety - Adult  Goal: Free from fall injury  Outcome: Progressing  Bed in lowest position, wheels locked, 2/4 side rails up, nonskid footwear on. Bed/ chair check alarm in place, call light within reach. Pt instructed to call out when needing assistance. Pt stated understanding.

## 2024-04-16 NOTE — CARE COORDINATION
Case Management Discharge Summary  [facility] - Acute Rehab Unit    Patient:Maria E Valdez     Rehab Dx/Hx: Late effects of cerebral ischemic stroke [I69.30]       Today's Date: 4/16/2024    Discharge to:  [facility]   Pre-certification completed:  [x] No       [] Yes     [] N/A   Hospital Exemption Notification (HENS) completed:  [x] No       [] Yes     [] N/A   IMM given:  04/15/24       New Durable Medical Equipment ordered/agency:  Defer to snf once medically ready   Transportation:  Per [facility] staff via bed/stretcher       Confirmed discharge plan with:  Patient: [] No       [x] Yes  Family:  [] No       [x] Yes      Name:Zhanna  Contact number: [phone]     RN, name: Richie       Has lack of transportation kept you from medical appointments, meetings, work, or ADL's? [] Yes, it has kept me from medical appointments or from getting my meds  [] Yes, It has kept me from non-medical meetings, appointments, work, or getting things I need  [x] No  [] Pt unable to respond  [] Pt declines to respond     How often do you need to have someone help you when you read instructions, pamphlets, or other written material from your doctor or pharmacy? [] Never                             [] Always  [] Rarely                            [] Patient declines to respond  [x] Sometimes                    [] Patient unable to respond  [] Often       Note: Discharging nurse to complete WOODY, reconcile AVS, and place final copy with patient's discharge packet. RN to ensure that written prescriptions for  Level II medications are sent with patient to the facility as per protocol.          Signature:  Citlali Thompson RN

## 2024-04-16 NOTE — CARE COORDINATION
Writer received call from [facility] ARU JUANY/Do, pt acuted off ARU today and is now IP on C5.  Discharge plan from the ARU has been for pt to go to [facility], Angie/admissions has been following, pt does not need precert as she has Medicare and already met 3night IP stay.  CM will continue to follow pt's progress and coordinate discharge arrangements as appropriate.  LAURA Silveira-WINNIE

## 2024-04-16 NOTE — H&P
Hospital Medicine History & Physical      Date of Admission: 4/16/2024    Date of Service:  Pt seen/examined on  4/16/24    [x]Admitted to Inpatient with expected LOS greater than two midnights due to medical therapy.  []Placed in Observation status.    Chief Admission Complaint: Confusion, weakness, dyspnea    Presenting Admission History:       89 year old female with a PMH significant for permanent atrial fibrillation on AC/eliquis, COPD, HFrEF, DM, HTN, and OA who presented to [facility] on 3/30/24 with dizziness and falls. Cardiology and Neurology were consulted. MRI brain revealed acute dorsal right jm CVA.   Urology was consulted due to urinary retention and pollack was placed.   Course did stabilize and she was then discharged from acute care side of [facility] and was then  admitted to [facility] acute inpatient rehab unit on 4/3/24 due to functional deficits below her baseline.     Per report from the rehab unit she began to decline with her therapy late last week.  She was appearing more confused with decreased oral intake.  She did develop some acute kidney injury which improved with IV fluids and holding her Lasix.  There was concern for urinary tract infection she was started on antibiotics currently receiving Cipro for this.  She was to be discharged to SNF however there was concern with her increased confusion and decreased oral intake and increased weakness.  Therefore she was transferred back to acute care side of the hospital for further evaluation and management.    At the time my examination she has some confusion and looks weak and tired but has no significant complaints at this time.    Assessment/Plan:      Current Principal Problem:  Acute encephalopathy    Acute encephalopathy : Per report from the rehab unit this been increased confusion.  Repeat CT of the head was done on 410/24 showed no new acute changes.  She was seen by neurology on 4/13/2024 and plan was to continue

## 2024-04-17 ENCOUNTER — APPOINTMENT (OUTPATIENT)
Dept: CT IMAGING | Age: 89
DRG: 640 | End: 2024-04-17
Attending: INTERNAL MEDICINE
Payer: MEDICARE

## 2024-04-17 LAB
ALBUMIN SERPL-MCNC: 3.7 G/DL (ref 3.4–5)
ANION GAP SERPL CALCULATED.3IONS-SCNC: 13 MMOL/L (ref 3–16)
ANION GAP SERPL CALCULATED.3IONS-SCNC: 8 MMOL/L (ref 3–16)
BASOPHILS # BLD: 0.1 K/UL (ref 0–0.2)
BASOPHILS NFR BLD: 0.6 %
BUN SERPL-MCNC: 15 MG/DL (ref 7–20)
BUN SERPL-MCNC: 16 MG/DL (ref 7–20)
CALCIUM SERPL-MCNC: 9.1 MG/DL (ref 8.3–10.6)
CALCIUM SERPL-MCNC: 9.4 MG/DL (ref 8.3–10.6)
CHLORIDE SERPL-SCNC: 88 MMOL/L (ref 99–110)
CHLORIDE SERPL-SCNC: 88 MMOL/L (ref 99–110)
CHLORIDE UR-SCNC: 89 MMOL/L
CO2 SERPL-SCNC: 25 MMOL/L (ref 21–32)
CO2 SERPL-SCNC: 28 MMOL/L (ref 21–32)
CREAT SERPL-MCNC: 1 MG/DL (ref 0.6–1.2)
CREAT SERPL-MCNC: 1.1 MG/DL (ref 0.6–1.2)
DEPRECATED RDW RBC AUTO: 16.7 % (ref 12.4–15.4)
EOSINOPHIL # BLD: 0.1 K/UL (ref 0–0.6)
EOSINOPHIL NFR BLD: 1.6 %
GFR SERPLBLD CREATININE-BSD FMLA CKD-EPI: 48 ML/MIN/{1.73_M2}
GFR SERPLBLD CREATININE-BSD FMLA CKD-EPI: 54 ML/MIN/{1.73_M2}
GLUCOSE BLD-MCNC: 116 MG/DL (ref 70–99)
GLUCOSE BLD-MCNC: 153 MG/DL (ref 70–99)
GLUCOSE BLD-MCNC: 156 MG/DL (ref 70–99)
GLUCOSE BLD-MCNC: 183 MG/DL (ref 70–99)
GLUCOSE SERPL-MCNC: 149 MG/DL (ref 70–99)
GLUCOSE SERPL-MCNC: 196 MG/DL (ref 70–99)
HCT VFR BLD AUTO: 42.8 % (ref 36–48)
HGB BLD-MCNC: 13.9 G/DL (ref 12–16)
LYMPHOCYTES # BLD: 0.8 K/UL (ref 1–5.1)
LYMPHOCYTES NFR BLD: 9.8 %
MCH RBC QN AUTO: 30.6 PG (ref 26–34)
MCHC RBC AUTO-ENTMCNC: 32.5 G/DL (ref 31–36)
MCV RBC AUTO: 94 FL (ref 80–100)
MONOCYTES # BLD: 0.5 K/UL (ref 0–1.3)
MONOCYTES NFR BLD: 6.1 %
NEUTROPHILS # BLD: 6.9 K/UL (ref 1.7–7.7)
NEUTROPHILS NFR BLD: 81.9 %
PERFORMED ON: ABNORMAL
PHOSPHATE SERPL-MCNC: 2.4 MG/DL (ref 2.5–4.9)
PLATELET # BLD AUTO: 270 K/UL (ref 135–450)
PMV BLD AUTO: 7.8 FL (ref 5–10.5)
POTASSIUM SERPL-SCNC: 4.6 MMOL/L (ref 3.5–5.1)
POTASSIUM SERPL-SCNC: 4.6 MMOL/L (ref 3.5–5.1)
POTASSIUM UR-SCNC: 37.7 MMOL/L
RBC # BLD AUTO: 4.56 M/UL (ref 4–5.2)
SODIUM SERPL-SCNC: 124 MMOL/L (ref 136–145)
SODIUM SERPL-SCNC: 126 MMOL/L (ref 136–145)
SODIUM UR-SCNC: 102 MMOL/L
WBC # BLD AUTO: 8.4 K/UL (ref 4–11)

## 2024-04-17 PROCEDURE — 92526 ORAL FUNCTION THERAPY: CPT

## 2024-04-17 PROCEDURE — 6360000002 HC RX W HCPCS: Performed by: INTERNAL MEDICINE

## 2024-04-17 PROCEDURE — 51701 INSERT BLADDER CATHETER: CPT

## 2024-04-17 PROCEDURE — 51798 US URINE CAPACITY MEASURE: CPT

## 2024-04-17 PROCEDURE — 85025 COMPLETE CBC W/AUTO DIFF WBC: CPT

## 2024-04-17 PROCEDURE — 6370000000 HC RX 637 (ALT 250 FOR IP): Performed by: INTERNAL MEDICINE

## 2024-04-17 PROCEDURE — 97166 OT EVAL MOD COMPLEX 45 MIN: CPT

## 2024-04-17 PROCEDURE — 70450 CT HEAD/BRAIN W/O DYE: CPT

## 2024-04-17 PROCEDURE — 36415 COLL VENOUS BLD VENIPUNCTURE: CPT

## 2024-04-17 PROCEDURE — 2580000003 HC RX 258: Performed by: INTERNAL MEDICINE

## 2024-04-17 PROCEDURE — 97164 PT RE-EVAL EST PLAN CARE: CPT

## 2024-04-17 PROCEDURE — 84300 ASSAY OF URINE SODIUM: CPT

## 2024-04-17 PROCEDURE — 1200000000 HC SEMI PRIVATE

## 2024-04-17 PROCEDURE — 97535 SELF CARE MNGMENT TRAINING: CPT

## 2024-04-17 PROCEDURE — 80069 RENAL FUNCTION PANEL: CPT

## 2024-04-17 PROCEDURE — 84133 ASSAY OF URINE POTASSIUM: CPT

## 2024-04-17 PROCEDURE — 92523 SPEECH SOUND LANG COMPREHEN: CPT

## 2024-04-17 PROCEDURE — 82436 ASSAY OF URINE CHLORIDE: CPT

## 2024-04-17 PROCEDURE — 83935 ASSAY OF URINE OSMOLALITY: CPT

## 2024-04-17 PROCEDURE — 92610 EVALUATE SWALLOWING FUNCTION: CPT

## 2024-04-17 PROCEDURE — 97116 GAIT TRAINING THERAPY: CPT

## 2024-04-17 RX ORDER — LISINOPRIL 5 MG/1
5 TABLET ORAL DAILY
Status: DISCONTINUED | OUTPATIENT
Start: 2024-04-17 | End: 2024-04-21 | Stop reason: HOSPADM

## 2024-04-17 RX ORDER — SODIUM CHLORIDE 9 MG/ML
INJECTION, SOLUTION INTRAVENOUS CONTINUOUS
Status: DISCONTINUED | OUTPATIENT
Start: 2024-04-17 | End: 2024-04-17

## 2024-04-17 RX ADMIN — ATORVASTATIN CALCIUM 20 MG: 10 TABLET, FILM COATED ORAL at 10:58

## 2024-04-17 RX ADMIN — Medication 10 ML: at 11:00

## 2024-04-17 RX ADMIN — SODIUM CHLORIDE: 9 INJECTION, SOLUTION INTRAVENOUS at 12:40

## 2024-04-17 RX ADMIN — BUPROPION HYDROCHLORIDE 300 MG: 150 TABLET, EXTENDED RELEASE ORAL at 10:58

## 2024-04-17 RX ADMIN — ACETAMINOPHEN 1000 MG: 500 TABLET ORAL at 03:09

## 2024-04-17 RX ADMIN — ASPIRIN 81 MG: 81 TABLET, COATED ORAL at 10:58

## 2024-04-17 RX ADMIN — Medication 10 ML: at 19:25

## 2024-04-17 RX ADMIN — DULOXETINE HYDROCHLORIDE 20 MG: 20 CAPSULE, DELAYED RELEASE ORAL at 10:58

## 2024-04-17 RX ADMIN — Medication 10 ML: at 12:37

## 2024-04-17 RX ADMIN — ONDANSETRON 4 MG: 2 INJECTION INTRAMUSCULAR; INTRAVENOUS at 20:40

## 2024-04-17 RX ADMIN — APIXABAN 2.5 MG: 2.5 TABLET, FILM COATED ORAL at 20:31

## 2024-04-17 RX ADMIN — METOPROLOL SUCCINATE 25 MG: 25 TABLET, FILM COATED, EXTENDED RELEASE ORAL at 10:58

## 2024-04-17 RX ADMIN — CIPROFLOXACIN 500 MG: 500 TABLET, FILM COATED ORAL at 10:58

## 2024-04-17 RX ADMIN — VASOPRESSIN: 20 INJECTION, SOLUTION INTRAVENOUS at 16:55

## 2024-04-17 RX ADMIN — LISINOPRIL 5 MG: 5 TABLET ORAL at 10:58

## 2024-04-17 RX ADMIN — CIPROFLOXACIN 500 MG: 500 TABLET, FILM COATED ORAL at 20:31

## 2024-04-17 RX ADMIN — APIXABAN 2.5 MG: 2.5 TABLET, FILM COATED ORAL at 10:58

## 2024-04-17 RX ADMIN — INSULIN GLARGINE 10 UNITS: 100 INJECTION, SOLUTION SUBCUTANEOUS at 20:35

## 2024-04-17 ASSESSMENT — PAIN SCALES - GENERAL
PAINLEVEL_OUTOF10: 0
PAINLEVEL_OUTOF10: 0

## 2024-04-17 NOTE — PLAN OF CARE
SLP completed evaluation. Please refer to notes in EMR.      Thank you,   Chelsie Davis M.A. CCC-SLP   Speech-Language Pathologist

## 2024-04-17 NOTE — CARE COORDINATION
Writer reviewed chart, and spoke with RN, and MD. Patient plans to gp tp [facility] at [place], no cert, and all qualifications have been met. Palliative consult ordered. Na+ being replaced.     Hosp, Nephro, palliative     Suzie Vo RN

## 2024-04-17 NOTE — PLAN OF CARE
Problem: Chronic Conditions and Co-morbidities  Goal: Patient's chronic conditions and co-morbidity symptoms are monitored and maintained or improved  Outcome: Progressing     Problem: Discharge Planning  Goal: Discharge to home or other facility with appropriate resources  Outcome: Progressing     Problem: Safety - Adult  Goal: Free from fall injury  Outcome: Progressing  Bed in lowest position, wheels locked, 2/4 side rails up, nonskid footwear on. Bed/ chair check alarm in place, call light within reach. Pt instructed to call out when needing assistance. Pt stated understanding.

## 2024-04-17 NOTE — CONSULTS
PALLIATIVE MEDICINE CONSULTATION     Patient name:Maria E Valdez   MRN:[id number]    :8/3/1934  Room/Bed:37/0537-01   LOS: 1 day         Date of consult:2024    Inpatient consult to Palliative Care  Consult performed by: Chika Vasquez APRN - CNP  Consult ordered by: Zechariah Frederick MD              ASSESSMENT/RECOMMENDATIONS     89 y.o. female with recent CVA rehabbing in the ARU admitted back to the floor with confusion      Symptom Management:  Goals of Care- Maximize function.  Treat what can be treated at this time.  Grand daughter hoping patient will start eating and will continue  to participate with therapy.  Hoping that with a little more time and treatment, she will perk up. POA would not want artifical nutrition. Discussed possible scenario for comfort care if patient continues to decline (not eat, not work with therapy).  Code status changed to Limited x 4 Nos.    Debility - to right dorsal jm CVA.  Was previously in independent living and used a walker. Was working at therapy in the ARU following the stroke admission, and although initially she was making progress, it appears her progress began to diminish over the past few days.  PT/OT following this admission with recommendations for SNF.  Patient states she feels like she will probably need help \"with it all\" at discharge.  She is not sure if she wants to keep working with therapy but says she will at least try.  States she is willing to go into LTC to live if there is no other option to meet her needs.     Altered mental status - etiology unclear.  Per reports, confusion seemed to worsen over past few days.  Repeat CT head pending.  Being treated for possible UTI.  Patient does not seem confused during encounter today, but does appear aphasic.      Patient/Family Goals of Care :    24    Spoke with patient at the bedside.  Patient is alert and seems oriented.  She is drinking pepsi from a cup/straw.  She denies 
Consult Placed     Who: Chika Vasquez/ Palliative Care   Date:  Time:     Electronically signed by Zeinab Keane on 4/16/2024 at 1:39 PM    
(LANTUS) 100 UNIT/ML injection vial Inject 10 Units into the skin nightly      melatonin 3 MG TABS tablet Take 1 tablet by mouth daily      metoprolol succinate (TOPROL XL) 25 MG extended release tablet Take 1 tablet by mouth daily      albuterol sulfate HFA (VENTOLIN HFA) 108 (90 Base) MCG/ACT inhaler Inhale 2 puffs into the lungs every 6 hours as needed for Wheezing      calcium carbonate 600 MG TABS tablet Take 1 tablet by mouth every 3 hours as needed      glimepiride (AMARYL) 4 MG tablet       DULoxetine (CYMBALTA) 60 MG capsule Take 1 capsule by mouth      glucose blood VI test strips (ASCENSIA AUTODISC VI;ONE TOUCH ULTRA TEST VI) strip USE TO TEST YOUR BLOOD GLUCOSE ONCE DAILY      atorvastatin (LIPITOR) 20 MG tablet Take 1 tablet by mouth daily         Allergies:  Patient has no known allergies.    Social History:    Social History     Socioeconomic History    Marital status:      Spouse name: Not on file    Number of children: Not on file    Years of education: Not on file    Highest education level: Not on file   Occupational History    Not on file   Tobacco Use    Smoking status: Never    Smokeless tobacco: Never   Substance and Sexual Activity    Alcohol use: No    Drug use: No    Sexual activity: Not Currently     Partners: Male   Other Topics Concern    Not on file   Social History Narrative    Not on file     Social Determinants of Health     Financial Resource Strain: Not on file   Food Insecurity: No Food Insecurity (4/3/2024)    Hunger Vital Sign     Worried About Running Out of Food in the Last Year: Never true     Ran Out of Food in the Last Year: Never true   Transportation Needs: No Transportation Needs (4/3/2024)    PRAPARE - Transportation     Lack of Transportation (Medical): No     Lack of Transportation (Non-Medical): No   Physical Activity: Not on file   Stress: Not on file   Social Connections: Not on file   Intimate Partner Violence: Not on file   Housing Stability: Low Risk

## 2024-04-18 LAB
ALBUMIN SERPL-MCNC: 3.7 G/DL (ref 3.4–5)
ALBUMIN SERPL-MCNC: 3.8 G/DL (ref 3.4–5)
ALBUMIN SERPL-MCNC: 3.9 G/DL (ref 3.4–5)
ANION GAP SERPL CALCULATED.3IONS-SCNC: 10 MMOL/L (ref 3–16)
ANION GAP SERPL CALCULATED.3IONS-SCNC: 10 MMOL/L (ref 3–16)
ANION GAP SERPL CALCULATED.3IONS-SCNC: 11 MMOL/L (ref 3–16)
BUN SERPL-MCNC: 15 MG/DL (ref 7–20)
BUN SERPL-MCNC: 15 MG/DL (ref 7–20)
BUN SERPL-MCNC: 16 MG/DL (ref 7–20)
CALCIUM SERPL-MCNC: 8.6 MG/DL (ref 8.3–10.6)
CALCIUM SERPL-MCNC: 8.8 MG/DL (ref 8.3–10.6)
CALCIUM SERPL-MCNC: 8.8 MG/DL (ref 8.3–10.6)
CHLORIDE SERPL-SCNC: 90 MMOL/L (ref 99–110)
CHLORIDE SERPL-SCNC: 91 MMOL/L (ref 99–110)
CHLORIDE SERPL-SCNC: 95 MMOL/L (ref 99–110)
CO2 SERPL-SCNC: 25 MMOL/L (ref 21–32)
CO2 SERPL-SCNC: 26 MMOL/L (ref 21–32)
CO2 SERPL-SCNC: 26 MMOL/L (ref 21–32)
CREAT SERPL-MCNC: 0.9 MG/DL (ref 0.6–1.2)
CREAT SERPL-MCNC: 1 MG/DL (ref 0.6–1.2)
CREAT SERPL-MCNC: 1 MG/DL (ref 0.6–1.2)
GFR SERPLBLD CREATININE-BSD FMLA CKD-EPI: 54 ML/MIN/{1.73_M2}
GFR SERPLBLD CREATININE-BSD FMLA CKD-EPI: 54 ML/MIN/{1.73_M2}
GFR SERPLBLD CREATININE-BSD FMLA CKD-EPI: 61 ML/MIN/{1.73_M2}
GLUCOSE BLD-MCNC: 122 MG/DL (ref 70–99)
GLUCOSE BLD-MCNC: 124 MG/DL (ref 70–99)
GLUCOSE BLD-MCNC: 128 MG/DL (ref 70–99)
GLUCOSE BLD-MCNC: 159 MG/DL (ref 70–99)
GLUCOSE BLD-MCNC: 77 MG/DL (ref 70–99)
GLUCOSE SERPL-MCNC: 106 MG/DL (ref 70–99)
GLUCOSE SERPL-MCNC: 146 MG/DL (ref 70–99)
GLUCOSE SERPL-MCNC: 174 MG/DL (ref 70–99)
OSMOLALITY UR: 440 MOSM/KG (ref 390–1070)
PERFORMED ON: ABNORMAL
PERFORMED ON: NORMAL
PHOSPHATE SERPL-MCNC: 2.4 MG/DL (ref 2.5–4.9)
PHOSPHATE SERPL-MCNC: 2.4 MG/DL (ref 2.5–4.9)
PHOSPHATE SERPL-MCNC: 2.5 MG/DL (ref 2.5–4.9)
POTASSIUM SERPL-SCNC: 4.2 MMOL/L (ref 3.5–5.1)
POTASSIUM SERPL-SCNC: 4.4 MMOL/L (ref 3.5–5.1)
POTASSIUM SERPL-SCNC: 4.5 MMOL/L (ref 3.5–5.1)
SODIUM SERPL-SCNC: 126 MMOL/L (ref 136–145)
SODIUM SERPL-SCNC: 127 MMOL/L (ref 136–145)
SODIUM SERPL-SCNC: 131 MMOL/L (ref 136–145)

## 2024-04-18 PROCEDURE — 80069 RENAL FUNCTION PANEL: CPT

## 2024-04-18 PROCEDURE — 2580000003 HC RX 258: Performed by: INTERNAL MEDICINE

## 2024-04-18 PROCEDURE — 1200000000 HC SEMI PRIVATE

## 2024-04-18 PROCEDURE — 51702 INSERT TEMP BLADDER CATH: CPT

## 2024-04-18 PROCEDURE — 6370000000 HC RX 637 (ALT 250 FOR IP): Performed by: INTERNAL MEDICINE

## 2024-04-18 PROCEDURE — 97116 GAIT TRAINING THERAPY: CPT

## 2024-04-18 PROCEDURE — 51798 US URINE CAPACITY MEASURE: CPT

## 2024-04-18 PROCEDURE — 97110 THERAPEUTIC EXERCISES: CPT

## 2024-04-18 PROCEDURE — 36415 COLL VENOUS BLD VENIPUNCTURE: CPT

## 2024-04-18 RX ORDER — INSULIN GLARGINE 100 [IU]/ML
8 INJECTION, SOLUTION SUBCUTANEOUS NIGHTLY
Status: DISCONTINUED | OUTPATIENT
Start: 2024-04-18 | End: 2024-04-20

## 2024-04-18 RX ADMIN — BUPROPION HYDROCHLORIDE 300 MG: 150 TABLET, EXTENDED RELEASE ORAL at 08:47

## 2024-04-18 RX ADMIN — ATORVASTATIN CALCIUM 20 MG: 10 TABLET, FILM COATED ORAL at 08:57

## 2024-04-18 RX ADMIN — ASPIRIN 81 MG: 81 TABLET, COATED ORAL at 08:46

## 2024-04-18 RX ADMIN — LISINOPRIL 5 MG: 5 TABLET ORAL at 08:55

## 2024-04-18 RX ADMIN — DULOXETINE HYDROCHLORIDE 20 MG: 20 CAPSULE, DELAYED RELEASE ORAL at 08:43

## 2024-04-18 RX ADMIN — APIXABAN 2.5 MG: 2.5 TABLET, FILM COATED ORAL at 08:50

## 2024-04-18 RX ADMIN — METOPROLOL SUCCINATE 25 MG: 25 TABLET, FILM COATED, EXTENDED RELEASE ORAL at 08:48

## 2024-04-18 RX ADMIN — Medication 10 ML: at 08:49

## 2024-04-18 RX ADMIN — CIPROFLOXACIN 500 MG: 500 TABLET, FILM COATED ORAL at 08:54

## 2024-04-18 RX ADMIN — Medication 10 ML: at 19:42

## 2024-04-18 NOTE — CARE COORDINATION
Writer reviewed chart day 2, patient is planning to go to [facility] at DC, writer LVM for [facility] to verify they have a bed still. Per MD patient will likely be ready for DC in about 2 days. Nephro is following and watching patient.     HENS is complete, needs Rapid Covid when DC    Hosp, Nephro, palliative    Suzie Vo RN

## 2024-04-18 NOTE — PLAN OF CARE
Problem: Chronic Conditions and Co-morbidities  Goal: Patient's chronic conditions and co-morbidity symptoms are monitored and maintained or improved  4/17/2024 2011 by Katya Christensen RN  Outcome: Progressing  4/17/2024 1245 by Jacqui Rendon RN  Outcome: Progressing     Problem: Discharge Planning  Goal: Discharge to home or other facility with appropriate resources  4/17/2024 2011 by Katya Christensen RN  Outcome: Progressing  4/17/2024 1245 by Jacqui Rendon RN  Outcome: Progressing     Problem: Safety - Adult  Goal: Free from fall injury  4/17/2024 2011 by Katya Christensen RN  Outcome: Progressing  4/17/2024 1245 by Jacqui Rendon RN  Outcome: Progressing     Problem: Skin/Tissue Integrity  Goal: Absence of new skin breakdown  Description: 1.  Monitor for areas of redness and/or skin breakdown  2.  Assess vascular access sites hourly  3.  Every 4-6 hours minimum:  Change oxygen saturation probe site  4.  Every 4-6 hours:  If on nasal continuous positive airway pressure, respiratory therapy assess nares and determine need for appliance change or resting period.  Outcome: Progressing

## 2024-04-19 LAB
ALBUMIN SERPL-MCNC: 3.8 G/DL (ref 3.4–5)
ANION GAP SERPL CALCULATED.3IONS-SCNC: 10 MMOL/L (ref 3–16)
BILIRUB UR QL STRIP.AUTO: NEGATIVE
BUN SERPL-MCNC: 16 MG/DL (ref 7–20)
CALCIUM SERPL-MCNC: 8.5 MG/DL (ref 8.3–10.6)
CHLORIDE SERPL-SCNC: 98 MMOL/L (ref 99–110)
CLARITY UR: CLEAR
CO2 SERPL-SCNC: 26 MMOL/L (ref 21–32)
COLOR UR: ABNORMAL
CREAT SERPL-MCNC: 1.1 MG/DL (ref 0.6–1.2)
DEPRECATED RDW RBC AUTO: 16.6 % (ref 12.4–15.4)
GFR SERPLBLD CREATININE-BSD FMLA CKD-EPI: 48 ML/MIN/{1.73_M2}
GLUCOSE BLD-MCNC: 152 MG/DL (ref 70–99)
GLUCOSE BLD-MCNC: 178 MG/DL (ref 70–99)
GLUCOSE BLD-MCNC: 181 MG/DL (ref 70–99)
GLUCOSE BLD-MCNC: 96 MG/DL (ref 70–99)
GLUCOSE SERPL-MCNC: 240 MG/DL (ref 70–99)
GLUCOSE UR STRIP.AUTO-MCNC: NEGATIVE MG/DL
HCT VFR BLD AUTO: 42 % (ref 36–48)
HGB BLD-MCNC: 13.8 G/DL (ref 12–16)
HGB UR QL STRIP.AUTO: ABNORMAL
KETONES UR STRIP.AUTO-MCNC: NEGATIVE MG/DL
LEUKOCYTE ESTERASE UR QL STRIP.AUTO: ABNORMAL
MCH RBC QN AUTO: 30.9 PG (ref 26–34)
MCHC RBC AUTO-ENTMCNC: 32.9 G/DL (ref 31–36)
MCV RBC AUTO: 93.9 FL (ref 80–100)
NITRITE UR QL STRIP.AUTO: NEGATIVE
PERFORMED ON: ABNORMAL
PERFORMED ON: NORMAL
PH UR STRIP.AUTO: 6 [PH] (ref 5–8)
PHOSPHATE SERPL-MCNC: 2.1 MG/DL (ref 2.5–4.9)
PLATELET # BLD AUTO: 246 K/UL (ref 135–450)
PMV BLD AUTO: 7.6 FL (ref 5–10.5)
POTASSIUM SERPL-SCNC: 4.2 MMOL/L (ref 3.5–5.1)
PROT UR STRIP.AUTO-MCNC: NEGATIVE MG/DL
RBC # BLD AUTO: 4.48 M/UL (ref 4–5.2)
RBC #/AREA URNS HPF: NORMAL /HPF (ref 0–4)
SODIUM SERPL-SCNC: 134 MMOL/L (ref 136–145)
SP GR UR STRIP.AUTO: <=1.005 (ref 1–1.03)
UA COMPLETE W REFLEX CULTURE PNL UR: ABNORMAL
UA DIPSTICK W REFLEX MICRO PNL UR: YES
URN SPEC COLLECT METH UR: ABNORMAL
UROBILINOGEN UR STRIP-ACNC: 0.2 E.U./DL
WBC # BLD AUTO: 9.3 K/UL (ref 4–11)
WBC #/AREA URNS HPF: NORMAL /HPF (ref 0–5)

## 2024-04-19 PROCEDURE — 85027 COMPLETE CBC AUTOMATED: CPT

## 2024-04-19 PROCEDURE — 1200000000 HC SEMI PRIVATE

## 2024-04-19 PROCEDURE — 6370000000 HC RX 637 (ALT 250 FOR IP): Performed by: INTERNAL MEDICINE

## 2024-04-19 PROCEDURE — 2580000003 HC RX 258: Performed by: INTERNAL MEDICINE

## 2024-04-19 PROCEDURE — 81001 URINALYSIS AUTO W/SCOPE: CPT

## 2024-04-19 PROCEDURE — 36415 COLL VENOUS BLD VENIPUNCTURE: CPT

## 2024-04-19 PROCEDURE — 92507 TX SP LANG VOICE COMM INDIV: CPT

## 2024-04-19 PROCEDURE — 80069 RENAL FUNCTION PANEL: CPT

## 2024-04-19 PROCEDURE — 92526 ORAL FUNCTION THERAPY: CPT

## 2024-04-19 RX ORDER — QUETIAPINE FUMARATE 25 MG/1
25 TABLET, FILM COATED ORAL 2 TIMES DAILY
Status: DISCONTINUED | OUTPATIENT
Start: 2024-04-19 | End: 2024-04-19

## 2024-04-19 RX ADMIN — APIXABAN 2.5 MG: 2.5 TABLET, FILM COATED ORAL at 21:22

## 2024-04-19 RX ADMIN — METOPROLOL SUCCINATE 25 MG: 25 TABLET, FILM COATED, EXTENDED RELEASE ORAL at 09:49

## 2024-04-19 RX ADMIN — Medication 10 ML: at 09:44

## 2024-04-19 RX ADMIN — VASOPRESSIN: 20 INJECTION, SOLUTION INTRAVENOUS at 15:17

## 2024-04-19 RX ADMIN — APIXABAN 2.5 MG: 2.5 TABLET, FILM COATED ORAL at 09:47

## 2024-04-19 RX ADMIN — ASPIRIN 81 MG: 81 TABLET, COATED ORAL at 09:46

## 2024-04-19 RX ADMIN — QUETIAPINE FUMARATE 25 MG: 25 TABLET ORAL at 09:47

## 2024-04-19 RX ADMIN — Medication 10 ML: at 15:16

## 2024-04-19 RX ADMIN — BUPROPION HYDROCHLORIDE 300 MG: 150 TABLET, EXTENDED RELEASE ORAL at 09:50

## 2024-04-19 RX ADMIN — DULOXETINE HYDROCHLORIDE 20 MG: 20 CAPSULE, DELAYED RELEASE ORAL at 09:54

## 2024-04-19 RX ADMIN — ATORVASTATIN CALCIUM 20 MG: 10 TABLET, FILM COATED ORAL at 09:50

## 2024-04-19 RX ADMIN — LISINOPRIL 5 MG: 5 TABLET ORAL at 09:48

## 2024-04-19 ASSESSMENT — PAIN SCALES - GENERAL
PAINLEVEL_OUTOF10: 0
PAINLEVEL_OUTOF10: 3

## 2024-04-19 ASSESSMENT — PAIN SCALES - PAIN ASSESSMENT IN ADVANCED DEMENTIA (PAINAD)
FACIALEXPRESSION: SAD, FRIGHTENED, FROWN
BODYLANGUAGE: TENSE, DISTRESSED PACING, FIDGETING
TOTALSCORE: 3
CONSOLABILITY: DISTRACTED OR REASSURED BY VOICE/TOUCH
BREATHING: NORMAL

## 2024-04-19 NOTE — PROGRESS NOTES
ACUTE REHAB UNIT: DISCHARGE  [facility]    Patient:Maria E Valdez     Rehab Dx/Hx: Late effects of cerebral ischemic stroke [I69.30]   :8/3/1934  MRN:[id number]  Date of Admit: 4/3/2024   Date of Discharge: 2024    Subjective:   Order for patient discharge.  Reviewed discharge summary (AVS) with patient including medications, physical instructions (safety) and diet. Pt in stable condition. Report called into nurse(Jacqui). Patient transferred in bed to  unit-537.    Reconciled Medication List - Patient:   Medications were reviewed by RN at time of discharge with patient and/or family:  []  Via EMR   []  Via health information exchange  []  Verbal (e.g. in person, telephone, video conferencing)  [x]  Paper-based (e.g. fax, copies, printouts)   []  Other Methods (e.g. texting, email, CDs)    Reconciled Medication List - Subsequent provider:   [] No, current reconciled medication list not provided to the subsequent provider.  [x] Yes, current reconciled medication list provided to the subsequent provider.   [] Via EMR    [] Via health information exchange  [] Verbal (e.g. in person, telephone, video conferencing)  [x] Paper-based (e.g. fax, copies, printouts)   [] Other Methods (e.g. texting, email, CDs)    Discharge disposition:  Pt was discharged via:  []  Wheelchair  []  Stretcher  []  Safe ambulation and use of assistive device  [x]  Other: Bed    Pt was discharged to:  []  Private car  []  Transportation service to next destination  [x]  Other: Acute Care    Discharge destination:  []  Home  []  Skilled Nursing Facility  []  Long Term Care  [x]  Other: C5-537       Discharge BIMS:  Number of word repeated after first attempt:  []  0. None []  1. One []  2. Two []  3. Three  *Unable to assess due to cognition  Able to report correct year:  []  0. Missed by >5 years, or no answer  []  1. Missed by 2-5 years  []  2. Missed by 1 year  [x]  3. Correct    Able to report correct month:

## 2024-04-19 NOTE — DISCHARGE INSTR - COC
Continuity of Care Form    Patient Name: Maria E Valdez   :  8/3/1934  MRN:  [id number]    Admit date:  2024  Discharge date:  2024    Code Status Order: Limited   Advance Directives:     Admitting Physician:  Zechariah Frederick MD  PCP: Moises Winslow MD    Discharging Nurse: Jayleen Slater RN  Discharging Hospital Unit/Room#: 0537/0537-01  Discharging Unit Phone Number: [phone]    Emergency Contact:   Extended Emergency Contact Information  Primary Emergency Contact: Miko Valdez  Address: [facility]  [phone]  Relation: Child  Secondary Emergency Contact: GERALDO TO  Mobile Phone: [phone]  Relation: Grandchild   needed? No    Past Surgical History:  Past Surgical History:   Procedure Laterality Date    KNEE SURGERY         Immunization History:   Immunization History   Administered Date(s) Administered    COVID-19, MODERNA, (- formula), (age 12y+), IM, 50mcg/0.5mL 10/09/2023    COVID-19, PFIZER Bivalent, DO NOT Dilute, (age 12y+), IM, 30 mcg/0.3 mL 2022    COVID-19, PFIZER GRAY top, DO NOT Dilute, (age 12 y+), IM, 30 mcg/0.3 mL 2022    COVID-19, PFIZER PURPLE top, DILUTE for use, (age 12 y+), 30mcg/0.3mL 2021, 03/10/2021, 2021       Active Problems:  Patient Active Problem List   Diagnosis Code    Dermatophytosis of nail B35.1    Diabetic polyneuropathy ([facility]) E11.42    Type 2 diabetes mellitus with diabetic polyneuropathy ([facility]) E11.42    Chronic airway obstruction ([facility]) J44.9    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

## 2024-04-19 NOTE — CARE COORDINATION
Writer reviewed chart, patient is still slightly confused. Likely here for couple days. Patient accepted at [facility], will need a Covid when discharging.     Suzie Vo RN

## 2024-04-20 LAB
ALBUMIN SERPL-MCNC: 3.7 G/DL (ref 3.4–5)
ALBUMIN SERPL-MCNC: 3.8 G/DL (ref 3.4–5)
ANION GAP SERPL CALCULATED.3IONS-SCNC: 11 MMOL/L (ref 3–16)
ANION GAP SERPL CALCULATED.3IONS-SCNC: 9 MMOL/L (ref 3–16)
BUN SERPL-MCNC: 15 MG/DL (ref 7–20)
BUN SERPL-MCNC: 16 MG/DL (ref 7–20)
CALCIUM SERPL-MCNC: 8.8 MG/DL (ref 8.3–10.6)
CALCIUM SERPL-MCNC: 8.9 MG/DL (ref 8.3–10.6)
CHLORIDE SERPL-SCNC: 98 MMOL/L (ref 99–110)
CHLORIDE SERPL-SCNC: 99 MMOL/L (ref 99–110)
CO2 SERPL-SCNC: 25 MMOL/L (ref 21–32)
CO2 SERPL-SCNC: 26 MMOL/L (ref 21–32)
CREAT SERPL-MCNC: 1 MG/DL (ref 0.6–1.2)
CREAT SERPL-MCNC: 1.1 MG/DL (ref 0.6–1.2)
DEPRECATED RDW RBC AUTO: 16.6 % (ref 12.4–15.4)
GFR SERPLBLD CREATININE-BSD FMLA CKD-EPI: 48 ML/MIN/{1.73_M2}
GFR SERPLBLD CREATININE-BSD FMLA CKD-EPI: 54 ML/MIN/{1.73_M2}
GLUCOSE BLD-MCNC: 152 MG/DL (ref 70–99)
GLUCOSE BLD-MCNC: 162 MG/DL (ref 70–99)
GLUCOSE BLD-MCNC: 188 MG/DL (ref 70–99)
GLUCOSE BLD-MCNC: 199 MG/DL (ref 70–99)
GLUCOSE SERPL-MCNC: 212 MG/DL (ref 70–99)
GLUCOSE SERPL-MCNC: 216 MG/DL (ref 70–99)
HCT VFR BLD AUTO: 40.4 % (ref 36–48)
HGB BLD-MCNC: 13.3 G/DL (ref 12–16)
MCH RBC QN AUTO: 31 PG (ref 26–34)
MCHC RBC AUTO-ENTMCNC: 32.8 G/DL (ref 31–36)
MCV RBC AUTO: 94.5 FL (ref 80–100)
PERFORMED ON: ABNORMAL
PHOSPHATE SERPL-MCNC: 2.1 MG/DL (ref 2.5–4.9)
PHOSPHATE SERPL-MCNC: 2.2 MG/DL (ref 2.5–4.9)
PLATELET # BLD AUTO: 241 K/UL (ref 135–450)
PMV BLD AUTO: 8 FL (ref 5–10.5)
POTASSIUM SERPL-SCNC: 4.2 MMOL/L (ref 3.5–5.1)
POTASSIUM SERPL-SCNC: 4.3 MMOL/L (ref 3.5–5.1)
RBC # BLD AUTO: 4.28 M/UL (ref 4–5.2)
SODIUM SERPL-SCNC: 134 MMOL/L (ref 136–145)
SODIUM SERPL-SCNC: 134 MMOL/L (ref 136–145)
WBC # BLD AUTO: 8.3 K/UL (ref 4–11)

## 2024-04-20 PROCEDURE — 36415 COLL VENOUS BLD VENIPUNCTURE: CPT

## 2024-04-20 PROCEDURE — 80069 RENAL FUNCTION PANEL: CPT

## 2024-04-20 PROCEDURE — 2580000003 HC RX 258: Performed by: INTERNAL MEDICINE

## 2024-04-20 PROCEDURE — 1200000000 HC SEMI PRIVATE

## 2024-04-20 PROCEDURE — 51798 US URINE CAPACITY MEASURE: CPT

## 2024-04-20 PROCEDURE — 6370000000 HC RX 637 (ALT 250 FOR IP): Performed by: INTERNAL MEDICINE

## 2024-04-20 PROCEDURE — 92526 ORAL FUNCTION THERAPY: CPT

## 2024-04-20 PROCEDURE — 85027 COMPLETE CBC AUTOMATED: CPT

## 2024-04-20 RX ORDER — INSULIN GLARGINE 100 [IU]/ML
5 INJECTION, SOLUTION SUBCUTANEOUS NIGHTLY
Status: DISCONTINUED | OUTPATIENT
Start: 2024-04-20 | End: 2024-04-21 | Stop reason: HOSPADM

## 2024-04-20 RX ADMIN — ASPIRIN 81 MG: 81 TABLET, COATED ORAL at 10:30

## 2024-04-20 RX ADMIN — ATORVASTATIN CALCIUM 20 MG: 10 TABLET, FILM COATED ORAL at 10:30

## 2024-04-20 RX ADMIN — BUPROPION HYDROCHLORIDE 300 MG: 150 TABLET, EXTENDED RELEASE ORAL at 10:30

## 2024-04-20 RX ADMIN — METOPROLOL SUCCINATE 25 MG: 25 TABLET, FILM COATED, EXTENDED RELEASE ORAL at 10:30

## 2024-04-20 RX ADMIN — APIXABAN 2.5 MG: 2.5 TABLET, FILM COATED ORAL at 20:28

## 2024-04-20 RX ADMIN — INSULIN GLARGINE 5 UNITS: 100 INJECTION, SOLUTION SUBCUTANEOUS at 20:27

## 2024-04-20 RX ADMIN — APIXABAN 2.5 MG: 2.5 TABLET, FILM COATED ORAL at 10:30

## 2024-04-20 RX ADMIN — Medication 10 ML: at 10:30

## 2024-04-20 RX ADMIN — LISINOPRIL 5 MG: 5 TABLET ORAL at 10:30

## 2024-04-20 RX ADMIN — Medication 10 ML: at 20:28

## 2024-04-20 RX ADMIN — DULOXETINE HYDROCHLORIDE 20 MG: 20 CAPSULE, DELAYED RELEASE ORAL at 10:38

## 2024-04-20 ASSESSMENT — PAIN SCALES - PAIN ASSESSMENT IN ADVANCED DEMENTIA (PAINAD)
TOTALSCORE: 2
BREATHING: NORMAL
CONSOLABILITY: DISTRACTED OR REASSURED BY VOICE/TOUCH
FACIALEXPRESSION: SMILING OR INEXPRESSIVE
BODYLANGUAGE: TENSE, DISTRESSED PACING, FIDGETING

## 2024-04-20 ASSESSMENT — PAIN SCALES - GENERAL: PAINLEVEL_OUTOF10: 0

## 2024-04-20 ASSESSMENT — PAIN - FUNCTIONAL ASSESSMENT: PAIN_FUNCTIONAL_ASSESSMENT: ACTIVITIES ARE NOT PREVENTED

## 2024-04-20 NOTE — PLAN OF CARE

## 2024-04-20 NOTE — FLOWSHEET NOTE
04/20/24 1630   Assessment   Charting Type Shift assessment   Psychosocial   Psychosocial (WDL) X   Patient Behaviors Impulsive;Restless;Anxious   Neurological   Neuro (WDL) X   Level of Consciousness 0   Orientation Level Oriented to person;Oriented to place;Disoriented to time;Disoriented to situation   Cognition Impulsive;Poor judgement;Poor safety awareness;Poor attention/concentration;Short term memory loss   Speech Clear   R Hand  Moderate   L Hand  Moderate   R Foot Dorsiflexion Moderate   L Foot Dorsiflexion Moderate   R Foot Plantar Flexion Moderate   L Foot Plantar Flexion Moderate   RUE Motor Response Responds to command   RUE Sensation  Full sensation;No numbness;No pain;No tingling   LUE Motor Response Responds to command   LUE Sensation  Full sensation;No numbness;No pain;No tingling   RLE Motor Response Responds to command   RLE Sensation  Full sensation;No numbness;No pain;No tingling   LLE Motor Response Responds to command   LLE Sensation  Full sensation;No numbness;No pain;No tingling   Cough Reflex Present   Vernon Coma Scale   Eye Opening 4   Best Verbal Response 4   Best Motor Response 6   [place]on Coma Scale Score 14   NIHSS Stroke Scale   NIHSS Stroke Scale Assessed No   HEENT (Head, Ears, Eyes, Nose, & Throat)   HEENT (WDL) X   Right Ear Impaired hearing;Hearing aid   Left Ear Impaired hearing;Hearing aid   Teeth Dentures upper   Respiratory   Respiratory (WDL) X   Respiratory Interventions H.O.B. elevated   Respiratory Pattern Regular   Respiratory Depth Normal   L Breath Sounds Diminished;Clear   R Breath Sounds Diminished;Clear   Cough/Sputum   Sputum How Obtained Spontaneous cough   Cough Dry;Non-productive;Strong   Sputum Amount None   Cardiac   Cardiac (WDL) X   Cardiac Regularity Irregular   Heart Sounds S1, S2   Cardiac Rhythm Atrial fib   Cardiac Symptoms Peripheral edema   Gastrointestinal   Abdominal (WDL) X   Pre Hospital Interventions Stool softener   RUQ Bowel Sounds

## 2024-04-20 NOTE — PLAN OF CARE
Problem: Safety - Adult  Goal: Free from fall injury  Outcome: Progressing     Problem: Skin/Tissue Integrity  Goal: Absence of new skin breakdown  Description: 1.  Monitor for areas of redness and/or skin breakdown  2.  Assess vascular access sites hourly  3.  Every 4-6 hours minimum:  Change oxygen saturation probe site  4.  Every 4-6 hours:  If on nasal continuous positive airway pressure, respiratory therapy assess nares and determine need for appliance change or resting period.  Outcome: Progressing     Problem: ABCDS Injury Assessment  Goal: Absence of physical injury  Outcome: Progressing     Problem: Pain  Goal: Verbalizes/displays adequate comfort level or baseline comfort level  Outcome: Progressing     Problem: Chronic Conditions and Co-morbidities  Goal: Patient's chronic conditions and co-morbidity symptoms are monitored and maintained or improved  Outcome: Progressing     Problem: Discharge Planning  Goal: Discharge to home or other facility with appropriate resources  Outcome: Progressing

## 2024-04-21 VITALS
DIASTOLIC BLOOD PRESSURE: 69 MMHG | SYSTOLIC BLOOD PRESSURE: 139 MMHG | HEART RATE: 80 BPM | WEIGHT: 176.7 LBS | HEIGHT: 62 IN | OXYGEN SATURATION: 94 % | RESPIRATION RATE: 15 BRPM | BODY MASS INDEX: 32.52 KG/M2 | TEMPERATURE: 97.8 F

## 2024-04-21 LAB
ALBUMIN SERPL-MCNC: 3.6 G/DL (ref 3.4–5)
ANION GAP SERPL CALCULATED.3IONS-SCNC: 12 MMOL/L (ref 3–16)
BUN SERPL-MCNC: 15 MG/DL (ref 7–20)
CALCIUM SERPL-MCNC: 8.8 MG/DL (ref 8.3–10.6)
CHLORIDE SERPL-SCNC: 99 MMOL/L (ref 99–110)
CO2 SERPL-SCNC: 22 MMOL/L (ref 21–32)
CREAT SERPL-MCNC: 0.9 MG/DL (ref 0.6–1.2)
GFR SERPLBLD CREATININE-BSD FMLA CKD-EPI: 61 ML/MIN/{1.73_M2}
GLUCOSE BLD-MCNC: 201 MG/DL (ref 70–99)
GLUCOSE SERPL-MCNC: 235 MG/DL (ref 70–99)
MAGNESIUM SERPL-MCNC: 1.7 MG/DL (ref 1.8–2.4)
PERFORMED ON: ABNORMAL
PHOSPHATE SERPL-MCNC: 2 MG/DL (ref 2.5–4.9)
POTASSIUM SERPL-SCNC: 4.3 MMOL/L (ref 3.5–5.1)
SARS-COV-2 RDRP RESP QL NAA+PROBE: NOT DETECTED
SODIUM SERPL-SCNC: 133 MMOL/L (ref 136–145)

## 2024-04-21 PROCEDURE — 36415 COLL VENOUS BLD VENIPUNCTURE: CPT

## 2024-04-21 PROCEDURE — 2580000003 HC RX 258: Performed by: INTERNAL MEDICINE

## 2024-04-21 PROCEDURE — 80069 RENAL FUNCTION PANEL: CPT

## 2024-04-21 PROCEDURE — 87635 SARS-COV-2 COVID-19 AMP PRB: CPT

## 2024-04-21 PROCEDURE — 6370000000 HC RX 637 (ALT 250 FOR IP): Performed by: INTERNAL MEDICINE

## 2024-04-21 PROCEDURE — 6360000002 HC RX W HCPCS: Performed by: INTERNAL MEDICINE

## 2024-04-21 PROCEDURE — 83735 ASSAY OF MAGNESIUM: CPT

## 2024-04-21 RX ORDER — LISINOPRIL 5 MG/1
5 TABLET ORAL DAILY
Qty: 30 TABLET | Refills: 3 | Status: SHIPPED | OUTPATIENT
Start: 2024-04-22

## 2024-04-21 RX ORDER — MAGNESIUM SULFATE 1 G/100ML
1000 INJECTION INTRAVENOUS ONCE
Status: COMPLETED | OUTPATIENT
Start: 2024-04-21 | End: 2024-04-21

## 2024-04-21 RX ORDER — INSULIN GLARGINE 100 [IU]/ML
5 INJECTION, SOLUTION SUBCUTANEOUS NIGHTLY
Qty: 10 ML | Refills: 3 | Status: SHIPPED | OUTPATIENT
Start: 2024-04-21

## 2024-04-21 RX ADMIN — Medication 10 ML: at 08:45

## 2024-04-21 RX ADMIN — LISINOPRIL 5 MG: 5 TABLET ORAL at 08:44

## 2024-04-21 RX ADMIN — INSULIN LISPRO 1 UNITS: 100 INJECTION, SOLUTION INTRAVENOUS; SUBCUTANEOUS at 08:44

## 2024-04-21 RX ADMIN — ASPIRIN 81 MG: 81 TABLET, COATED ORAL at 08:44

## 2024-04-21 RX ADMIN — DULOXETINE HYDROCHLORIDE 20 MG: 20 CAPSULE, DELAYED RELEASE ORAL at 08:44

## 2024-04-21 RX ADMIN — BUPROPION HYDROCHLORIDE 300 MG: 150 TABLET, EXTENDED RELEASE ORAL at 08:44

## 2024-04-21 RX ADMIN — MAGNESIUM SULFATE HEPTAHYDRATE 1000 MG: 1 INJECTION, SOLUTION INTRAVENOUS at 09:55

## 2024-04-21 RX ADMIN — APIXABAN 2.5 MG: 2.5 TABLET, FILM COATED ORAL at 08:44

## 2024-04-21 RX ADMIN — METOPROLOL SUCCINATE 25 MG: 25 TABLET, FILM COATED, EXTENDED RELEASE ORAL at 08:44

## 2024-04-21 RX ADMIN — ATORVASTATIN CALCIUM 20 MG: 10 TABLET, FILM COATED ORAL at 08:44

## 2024-04-21 NOTE — PLAN OF CARE
Problem: Chronic Conditions and Co-morbidities  Goal: Patient's chronic conditions and co-morbidity symptoms are monitored and maintained or improved  4/20/2024 2132 by Stu Nathan LPN  Outcome: Progressing  4/20/2024 1714 by PRISCILA BARRY  Outcome: Progressing     Problem: Discharge Planning  Goal: Discharge to home or other facility with appropriate resources  4/20/2024 2132 by Stu Nathan LPN  Outcome: Progressing  4/20/2024 1714 by PRISCILA BARRY  Outcome: Progressing     Problem: Safety - Adult  Goal: Free from fall injury  4/20/2024 2132 by Stu Nathan LPN  Outcome: Progressing  4/20/2024 1714 by PRISCILA BARRY  Outcome: Progressing     Problem: Skin/Tissue Integrity  Goal: Absence of new skin breakdown  Description: 1.  Monitor for areas of redness and/or skin breakdown  2.  Assess vascular access sites hourly  3.  Every 4-6 hours minimum:  Change oxygen saturation probe site  4.  Every 4-6 hours:  If on nasal continuous positive airway pressure, respiratory therapy assess nares and determine need for appliance change or resting period.  4/20/2024 2132 by Stu Nathan LPN  Outcome: Progressing  4/20/2024 1714 by PRISCILA BARRY  Outcome: Progressing     Problem: ABCDS Injury Assessment  Goal: Absence of physical injury  4/20/2024 2132 by Stu Nathan LPN  Outcome: Progressing  4/20/2024 1714 by PRISCILA BARRY  Outcome: Progressing     Problem: Pain  Goal: Verbalizes/displays adequate comfort level or baseline comfort level  4/20/2024 2132 by Stu Nathan LPN  Outcome: Progressing  Flowsheets (Taken 4/20/2024 1930)  Verbalizes/displays adequate comfort level or baseline comfort level:   Assess pain using appropriate pain scale   Encourage patient to monitor pain and request assistance   Administer analgesics based on type and severity of pain and evaluate response   Implement non-pharmacological measures as appropriate and evaluate response  4/20/2024 1714 by ASHA

## 2024-04-21 NOTE — PROGRESS NOTES
Hospital Medicine Progress Note      Date of Admission: 4/16/2024  Hospital Day: 2    Chief Admission Complaint:   Confusion, weakness, sob     Subjective: She is up sitting in bed.  In no acute distress.  Looks weak and tired but has no significant complaints at this time    Presenting Admission History:       89 year old female with a PMH significant for permanent atrial fibrillation on AC/eliquis, COPD, HFrEF, DM, HTN, and OA who presented to [facility] on 3/30/24 with dizziness and falls. Cardiology and Neurology were consulted. 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   Urology was consulted due to urinary retention and pollack was placed.   Course did stabilize and she was then discharged from acute care side of [facility] and was then  admitted to [facility] acute inpatient rehab unit on 4/3/24 due to functional deficits below her baseline.      Per report from the rehab unit she began to decline with her therapy late last week.  She was appearing more confused with decreased oral intake.  She did develop some acute kidney injury which improved with IV fluids and holding her Lasix.  There was concern for urinary tract infection she was started on antibiotics currently receiving Cipro for this.  She was to be discharged to SNF however there was concern with her increased confusion and decreased oral intake and increased weakness.  Therefore she was transferred back to acute care side of the hospital for further evaluation and management.       Assessment/Plan:      Current Principal Problem:  Acute encephalopathy    Acute encephalopathy : Per report from the rehab unit this been increased confusion.  Repeat CT of the head was done on 410/24 showed no new acute changes.  She was seen by neurology on 4/13/2024 and plan was to continue supportive care.  We will follow mental status, continue supportive care.     Dorsal Right Nitin CVA  - secondary stroke prevention with asa, statin, Eliquis  - 
  Hospital Medicine Progress Note      Date of Admission: 4/16/2024  Hospital Day: 3    Chief Admission Complaint:   Confusion, weakness, sob      Subjective: She is sitting in bed.  In no acute distress but looks weak and tired.  She is not eating or drinking very well.  She has some confusion she did not verbalize any complaints for me at this time.      Presenting Admission History:       89 year old female with a PMH significant for permanent atrial fibrillation on AC/eliquis, COPD, HFrEF, DM, HTN, and OA who presented to [facility] on 3/30/24 with dizziness and falls. Cardiology and Neurology were consulted. MRI brain revealed acute dorsal right jm CVA.   Urology was consulted due to urinary retention and pollack was placed.   Course did stabilize and she was then discharged from acute care side of [facility] and was then  admitted to [facility] acute inpatient rehab unit on 4/3/24 due to functional deficits below her baseline.      Per report from the rehab unit she began to decline with her therapy late last week.  She was appearing more confused with decreased oral intake.  She did develop some acute kidney injury which improved with IV fluids and holding her Lasix.  There was concern for urinary tract infection she was started on antibiotics currently receiving Cipro for this.  She was to be discharged to SNF however there was concern with her increased confusion and decreased oral intake and increased weakness.  Therefore she was transferred back to acute care side of the hospital for further evaluation and management.  Her condition over the past 2 days has not significantly improved.  She is still confused, appetite is poor.  Sodium level was noted to be low and nephrology was consulted.   Repeat head CT was done on 4/17/2024 and did not reveal any new neurological issues.    Assessment/Plan:      Current Principal Problem:  Acute encephalopathy    Acute encephalopathy : Per report from the 
  Hospital Medicine Progress Note      Date of Admission: 4/16/2024  Hospital Day: 4    Chief Admission Complaint:   Confusion, weakness, sob      Subjective: She is lying in bed.  She looks weak and tired.  Not eating or drinking well.  She is refusing blood draws and refusing medications at times  She is having confusion.  She did not verbalize any complaints for me at this time.    Presenting Admission History:       89 year old female with a PMH significant for permanent atrial fibrillation on AC/eliquis, COPD, HFrEF, DM, HTN, and OA who presented to [facility] on 3/30/24 with dizziness and falls. Cardiology and Neurology were consulted. MRI brain revealed acute dorsal right jm CVA.   Urology was consulted due to urinary retention and pollack was placed.   Course did stabilize and she was then discharged from acute care side of [facility] and was then  admitted to [facility] acute inpatient rehab unit on 4/3/24 due to functional deficits below her baseline.      Per report from the rehab unit she began to decline with her therapy late last week.  She was appearing more confused with decreased oral intake.  She did develop some acute kidney injury which improved with IV fluids and holding her Lasix.  There was concern for urinary tract infection she was started on antibiotics currently receiving Cipro for this.  She was to be discharged to SNF however there was concern with her increased confusion and decreased oral intake and increased weakness.  Therefore she was transferred back to acute care side of the hospital for further evaluation and management.  Her condition over the past few days has not improved.  She is still confused, appetite is remaining quite poor.  Sodium level was noted to be low and nephrology was consulted.   Repeat head CT was done on 4/17/2024 and did not reveal any new neurological issues.    Assessment/Plan:      Current Principal Problem:  Acute encephalopathy    Acute 
  Hospital Medicine Progress Note      Date of Admission: 4/16/2024  Hospital Day: 5    Chief Admission Complaint: Confusion, weakness, shortness of breath.  Originally she admitted for frequent falls.     Subjective: Sitting up in a chair.  She is more interactive, awake than she was yesterday.  She is still confused however more alert than yesterday.  She denies any complaints.  However she is demanding to be discharged home today and feels that we are keeping her prisoner/against her will in the hospital.     Presenting Admission History:       89 year old female with a PMH significant for permanent atrial fibrillation on AC/eliquis, COPD, HFrEF, DM, HTN, and OA who presented to [facility] on 3/30/24 with dizziness and falls. Cardiology and Neurology were consulted. MRI brain revealed acute dorsal right jm CVA.   Urology was consulted due to urinary retention and pollack was placed.   She was then discharged from acute care side of [facility] and was then  admitted to [facility] acute inpatient rehab unit on 4/3/24 due to functional deficits below her baseline.        On 4/16/2024 she was transferred back from the [facility] rehab to acute care.  Per report from the rehab unit she began to decline with her therapy late last week.  She was appearing more confused with decreased oral intake.  She did develop some acute kidney injury which improved with IV fluids and holding her Lasix.  There was concern for urinary tract infection she was started on antibiotics currently receiving Cipro for this.  She was to be discharged to SNF however there was concern about her increased confusion and decreased oral intake and increased weakness and that she was not appropriate to be transferred to SNF in this condition.  Therefore she was transferred back to acute care side of the hospital for further evaluation and management.  Her condition over the past few days has been tumultuous.    She has been confused, appetite 
  Speech Language Pathology  Dysphagia Treatment Follow-Up Note  Speech / Language / Cognitive Treatment Follow-Up Note  Facility/Department: [place] [id number] - MED SURG/ORTHO    Recommendations:  Recommend diet advancement to regular solids with 1:1 supervision with all PO to promote safety with intake. If alertness fluctuates from meal to meal or fatigue returns and pt demonstrates decreased engagement in feeding task, downgrade back to minced and moist solids and thin liquids. Meds PO as tolerated with PO ONLY WHEN PT ALERT. Further recommend 1:1: supervision                 Diet recommendation: Recommend advance IDDSI 7 Regular Solids; IDDSI 0 Thin Liquids; Meds PO as tolerated. *PO ONLY WHEN PT ALERT *1:1 supervision  Instrumentation: Not clinically indicated at this time. Will continue to monitor  Risk management: upright for all intake, stay upright for at least 30 mins after intake, small bites/sips, 1:1 supervision with intake, oral care 2-3x/day to reduce adverse affects in the event of aspiration, increase physical mobility as able, alternate bites/sips, slow rate of intake, general GERD precautions, general aspiration precautions, and hold PO and contact SLP if s/s of aspiration or worsening respiratory status develop.    NAME:Maria E Valdez  : 8/3/1934 (89 y.o.)   MRN: [id number]  ROOM: 0537/0537-01  ADMISSION DATE: 2024  PATIENT DIAGNOSIS(ES): Acute encephalopathy [G93.40]  No Known Allergies    DATE ONSET: 2024    Pain: The patient does not complain of pain       Current Diet: ADULT ORAL NUTRITION SUPPLEMENT; Lunch, Dinner; Standard High Calorie/High Protein Oral Supplement  ADULT DIET; Dysphagia - Minced and Moist; 4 carb choices (60 gm/meal); Low Fat/Low Chol/High Fiber/KIMMY      Diet Tolerance:  Patient with reduced PO intake since downgrade per RN    Subjective:   Pt seen in room at bedside with RN permission   Behavior / Cognition: Pt agitated this day per RN. Remains confused and asking to 
  Speech Language Pathology  Dysphagia Treatment Follow-Up Note  Speech / Language / Cognitive Treatment Follow-Up Note  Facility/Department: [facility] C5 - MED SURG/ORTHO    Recommendations:  Diet recommendation: IDDSI 5 Minced and moist Solids; IDDSI 0 Thin Liquids; Meds PO as tolerated  Instrumentation: Not clinically indicated at this time. Will continue to monitor  *PO ONLY WHEN PT ALERT  Risk management: upright for all intake, stay upright for at least 30 mins after intake, small bites/sips, 1:1 supervision with intake, oral care 2-3x/day to reduce adverse affects in the event of aspiration, increase physical mobility as able, alternate bites/sips, slow rate of intake, general GERD precautions, general aspiration precautions, and hold PO and contact SLP if s/s of aspiration or worsening respiratory status develop.    NAME:Maria E Valdez  : 8/3/1934 (89 y.o.)   MRN: [id number]  ROOM: [address] [place] [address]  ADMISSION DATE: 2024  PATIENT DIAGNOSIS(ES): Acute encephalopathy [G93.40]  No Known Allergies    DATE ONSET: 2024    Pain: The patient does not complain of pain       Current Diet: ADULT ORAL NUTRITION SUPPLEMENT; Lunch, Dinner; Standard High Calorie/High Protein Oral Supplement  ADULT DIET; Dysphagia - Minced and Moist; 4 carb choices (60 gm/meal); Low Fat/Low Chol/High Fiber/KIMMY      Diet Tolerance:  Patient with reduced PO intake since downgrade per RN    Subjective:   Pt seen in room at bedside with RN permission   Behavior / Cognition: Lethargic, confused  RN Report/Chart Review: RN reported pt with reduced PO intake since downgrade and that pt reported to RN she \"hates\" the food  Patient tolerance: Pt with reduced PO intake    Dysphagia Treatment and Impressions:  Baseline Respiratory Status Measures: Pt with SPO2% of 95 on RA with RR of 16    Liquid PO Trials:    IDDSI 0 Thin:  Assessed via cup: no anterior bolus loss , suspect premature bolus loss into pharynx, no clinical s/s of aspiration, and 
  [facility] Progress Note           Subjective/   89 y.o. year old female who we are seeing in consultation for Hyponatremia.     HPI:  Sodium levels better with course of concentrated saline   She is feeling much better  Intake better  Hoping to get out soon     ROS:  No fevers  No edema    Soc:  No family at bedside     Objective/   GEN:  Chronically ill, BP (!) 160/91   Pulse 80   Temp 98.3 °F (36.8 °C) (Axillary)   Resp 16   Wt 80 kg (176 lb 5.9 oz)   SpO2 94%   BMI 31.25 kg/m²   HEENT: non-icteric, no JVD  CV: S1, S2 without m/r/g; no LE edema  RESP: CTA B without w/r/r; breathing wnl  ABD: +bs, soft, nt, no hsm  SKIN: warm, no rashes    Data/  Recent Labs     04/19/24 2322 04/20/24  0610   WBC 9.3 8.3   HGB 13.8 13.3   HCT 42.0 40.4   MCV 93.9 94.5    241       Recent Labs     04/19/24 2322 04/20/24  0610 04/20/24  1402   * 134* 134*   K 4.2 4.2 4.3   CL 98* 99 98*   CO2 26 26 25   GLUCOSE 240* 216* 212*   PHOS 2.1* 2.2* 2.1*   BUN 16 16 15   CREATININE 1.1 1.1 1.0   LABGLOM 48* 48* 54*       Urine sodium 102  Urine chloride 89  Urine osmolality 440    Assessment/     - Hyponatremia - acute on chronic, suspect SIADH state +/- pre-renal state     - Acute metabolic encephalopathy     - Hypertension       Plan/     - Continue ~2% saline until AM  - I suspect and stop IV fluids tomorrow and would be good for discharge   - Trend labs, bp's, weights, & urine output    ____________________________________  Aisha Vargas MD  [facility]  [email or web address]  Office: [phone]    
  [facility] Progress Note           Subjective/   89 y.o. year old female who we are seeing in consultation for Hyponatremia.     HPI:  Sodium levels better with course of concentrated saline though trending down off fluids from 4/18.  Refusing lab draws over last 24 hours.  Intake reduced.    ROS:  +weak, confusion persists.    Objective/   GEN:  Chronically ill, /74   Pulse 71   Temp 97.8 °F (36.6 °C) (Axillary)   Resp 18   Wt 84.1 kg (185 lb 6.5 oz)   SpO2 97%   BMI 32.85 kg/m²   HEENT: non-icteric, no JVD  CV: S1, S2 without m/r/g; no LE edema  RESP: CTA B without w/r/r; breathing wnl  ABD: +bs, soft, nt, no hsm  SKIN: warm, no rashes    Data/  Recent Labs     04/17/24  0950   WBC 8.4   HGB 13.9   HCT 42.8   MCV 94.0          Recent Labs     04/17/24  2354 04/18/24  0528 04/18/24  1430   * 131* 126*   K 4.4 4.2 4.5   CL 91* 95* 90*   CO2 25 26 26   GLUCOSE 174* 106* 146*   PHOS 2.5 2.4* 2.4*   BUN 16 15 15   CREATININE 0.9 1.0 1.0   LABGLOM 61 54* 54*       Urine sodium 102  Urine chloride 89  Urine osmolality 440    Assessment/     - Hyponatremia - acute on chronic, suspect SIADH state +/- pre-renal state     - Acute metabolic encephalopathy     - Hypertension       Plan/     - Resume ~2% saline with downward trending sodium levels  - Trend labs, bp's, weights, & urine output    ____________________________________  Archie Gil MD  [facility]  [email or web address]  Office: [phone]    
  [facility] Progress Note           Subjective/   89 y.o. year old female who we are seeing in consultation for Hyponatremia.     HPI:  Sodium levels better with course of concentrated saline.  Intake better.    ROS:  +weak, confusion better.    Objective/   GEN:  Chronically ill, BP (!) 151/95   Pulse 77   Temp 97.6 °F (36.4 °C) (Axillary)   Resp 14   Wt 81 kg (178 lb 9.2 oz)   SpO2 98%   BMI 31.64 kg/m²   HEENT: non-icteric, no JVD  CV: S1, S2 without m/r/g; no LE edema  RESP: CTA B without w/r/r; breathing wnl  ABD: +bs, soft, nt, no hsm  SKIN: warm, no rashes    Data/  Recent Labs     04/17/24  0950   WBC 8.4   HGB 13.9   HCT 42.8   MCV 94.0        Recent Labs     04/17/24  1834 04/17/24  2354 04/18/24  0528   * 127* 131*   K 4.6 4.4 4.2   CL 88* 91* 95*   CO2 25 25 26   GLUCOSE 196* 174* 106*   PHOS 2.4* 2.5 2.4*   BUN 15 16 15   CREATININE 1.0 0.9 1.0   LABGLOM 54* 61 54*     Urine sodium 102  Urine chloride 89  Urine osmolality pending    Assessment/     - Hyponatremia - acute on chronic, suspect low solute intake state +/- pre-renal state     - Acute metabolic encephalopathy     - Hypertension       Plan/     - Trial off ~2% saline today  - Trend labs, bp's, weights, & urine output    ____________________________________  Archie Gil MD  [facility]  [email or web address]  Office: [phone]    
1130 Called report to TONA Bautista.[phone]. Pt VSS. Awaiting transport.    1150 Pt discharged with transport. Pt pleasant \"ready to go\". All belonging sent with pt. Discharge instructions given to transport. Granddaughter aware of discharge.  
2030 RN offered patient her night medications, patient started yelling and telling the nurse she doesn't want to take any medication at the moment., states she doesn't want to die. RN still provided education on the importance of taking her medications, but patient consistently refused.   Tried to get up from the bed, insisted to have her sat on the chair. RN helped her transferred to the chair - chair alarm on, call button within reach.   
4 Eyes Skin Assessment     NAME:  Maria E Valdez  YOB: 1934  MEDICAL RECORD NUMBER:  [id number]    The patient is being assessed for  Admission    I agree that at least one RN has performed a thorough Head to Toe Skin Assessment on the patient. ALL assessment sites listed below have been assessed.      Areas assessed by both nurses:    Head, Face, Ears, Shoulders, Back, Chest, Arms, Elbows, Hands, Sacrum. Buttock, Coccyx, Ischium, and Legs. Feet and Heels        Scattered bruising. Old skin tear on dorsal left thigh, pink/red/dry.     Does the Patient have a Wound? Yes wound(s) were present on assessment. LDA wound assessment was Initiated and completed by RN       Jonathan Prevention initiated by RN: Yes  Wound Care Orders initiated by RN: No    Pressure Injury (Stage 3,4, Unstageable, DTI, NWPT, and Complex wounds) if present, place Wound referral order by RN under : No    New Ostomies, if present place, Ostomy referral order under : No     Nurse 1 eSignature: Electronically signed by Jacqui Rendon RN on 4/16/24 at 10:27 AM EDT    **SHARE this note so that the co-signing nurse can place an eSignature**    Nurse 2 eSignature: Electronically signed by Jazz Horner RN on 4/16/24 at 7:08 PM EDT    
Lab staff came and tried to draw blood but patient states to come back in later.   
No acute events over night, pt remains confused but pleasant. Pt calling out appropriately. Pt did not sleep well over night.  Tadeo Peña RN   
Ongoing IV fluids stopped per orders on 04/17/2024 ( Nursing communication: Stop IVF's if repeat sodiums is 130 or higher). Patient ate 6 packs of cristian crackers and 2 cans of diet pepsi, tolerated well. Will monitor. Electronically signed by Stefany Varela RN on 4/20/2024 at 1:19 AM   
PVR 750ml. SCx1 = 500ml. Urine sample sent to lab per Nephology orders.   
Patient asked to go to the bathroom. She did not urinate and was acting more confused and unsafe with ambulation per PCA. BP when returning to bed with 195/133, SPO2 77% room air. After sitting and rechecking VS /73, HR 80, SPO2 97% room air. MD made aware.   
Patient refused to do morning blood draw. Informed lab staff Angie to come and try again between 1766-5501 in the morning  
Patient voided 100 ml , bladder scan done after voiding showed 0 ml. Will monitor. Electronically signed by Stefany Varela RN on 4/20/2024 at 6:16 AM   
Patient's blood sugar this evening is 96 mg/dl, patient has orders for Lantus 8 units nightly. MD Kwan made aware and ordered to hold it for the night. Electronically signed by Stefany Varela RN on 4/19/2024 at 9:16 PM   
Physical Therapy  Facility/Department: [facility] C5 - MED SURG/ORTHO  Physical Therapy Re-Assessment/Treatment     Name: Maria E Valdez  : 8/3/1934  MRN: [id number]  Date of Service: 2024    Discharge Recommendations:  Subacute/Skilled Nursing Facility   PT Equipment Recommendations  Equipment Needed: No  Other: defer to facility      Therapy discharge recommendations take into account each patient's current medical complexities and are subject to input/oversight from the patient's healthcare team.   Barriers to Home Discharge:   [] Steps to access home entry or bed/bath:   [x] Unable to transfer, ambulate, or propel wheelchair household distances without assist   [x] Limited available assist at home upon discharge    [] Patient or family requests d/c to post-acute facility    [x] Poor cognition/safety awareness for d/c to home alone    []Unable to maintain ordered weight bearing status    [] Patient with salient signs of long-standing immobility   [x] Patient is at risk for falls   [] Other:    If pt is unable to be seen after this session, please let this note serve as discharge summary.  Please see case management note for discharge disposition.  Thank you.      Patient Diagnosis(es): There were no encounter diagnoses.  Past Medical History:  has a past medical history of Diabetes (HCC) and Osteoarthrosis.  Past Surgical History:  has a past surgical history that includes knee surgery.    Assessment   Body Structures, Functions, Activity Limitations Requiring Skilled Therapeutic Intervention: Decreased functional mobility ;Decreased strength;Decreased endurance;Decreased balance;Decreased safe awareness;Decreased body mechanics;Decreased posture  Assessment: Pt seen for PT re-evaluation following admission up to floor after acute discharge from ARU due to increasing confusion and SARANYA. Prior to admission, pt was independent with mobility using RW and lives in assisted living facility. Pt currently requires 
Pt admitted on 4/16/2024. This nurse unable to complete admission questions r/t Pt's cognition.  
Pt continues to refuse lab draws. Pt educated on reasons why it is important and pt replies \"get out of here\". Perfect serve message to Dr. Vargas.   
Shift assessment done. All night time medications given and patient tolerated well. Patient kept asking this RN that her pollack is very uncomfortable and is burning.  Pollack removed per protocol. MD Feliciano informed and made aware of her latest lab results, no new orders made. All fall precautions implemented. All needs attended. Electronically signed by Stefany Varela RN on 4/19/2024 at 11:57 PM   
Dermatophytosis of nail 10/22/2013    Diabetic polyneuropathy (HCC) 10/22/2013    Type 2 diabetes mellitus with diabetic polyneuropathy (HCC) 10/22/2013    Chronic airway obstruction (HCC) 06/20/2013    Osteoarthritis 06/20/2013    Chronic obstructive pulmonary disease (HCC) 06/20/2013    Nontoxic multinodular goiter 12/01/2008    Multinodular goiter 12/01/2008     Past Medical History:   Diagnosis Date    Diabetes (HCC)     Osteoarthrosis 6/20/2013     Past Surgical History:   Procedure Laterality Date    KNEE SURGERY       No Known Allergies    DATE ONSET: 4/16/2024    Date of Evaluation: 4/17/2024   Evaluating Therapist: SEAMUS Flores    Chart Reviewed: : [x] Yes [] No     Pain: The patient does not complain of pain     Current Diet: ADULT DIET; Regular; 4 carb choices (60 gm/meal); Low Fat/Low Chol/High Fiber/KIMMY  ADULT ORAL NUTRITION SUPPLEMENT; Lunch, Dinner; Standard High Calorie/High Protein Oral Supplement      Most Recent Imaging    XR CHEST PORTABLE   Final Result   Stable cardiomegaly with slightly increased perihilar interstitial opacities   most consistent with mild edema.         CT HEAD WO CONTRAST    (Results Pending)         Reason for Referral  Maria E Valdez was referred for a bedside swallow evaluation to assess the efficiency of their swallow function, identify signs and symptoms of aspiration and make recommendations regarding safe dietary consistencies, effective compensatory strategies, and safe eating environment.    Assessment    Medical record review/interview: Per MD H&P on 4/16/24: \"89 year old female with a PMH significant for permanent atrial fibrillation on AC/eliquis, COPD, HFrEF, DM, HTN, and OA who presented to [facility] on 3/30/24 with dizziness and falls. Cardiology and Neurology were consulted. MRI brain revealed acute dorsal right jm CVA.   Urology was consulted due to urinary retention and pollack was placed.   Course did stabilize and she was then discharged 
responses to stimuli  Following Commands: Follows one step commands with increased time;Follows one step commands with repetition  Attention Span: Attends with cues to redirect  Memory: Decreased short term memory;Decreased recall of recent events;Decreased long term memory  Safety Judgement: Decreased awareness of need for assistance;Decreased awareness of need for safety  Problem Solving: Assistance required to generate solutions;Assistance required to identify errors made;Assistance required to implement solutions;Decreased awareness of errors;Assistance required to correct errors made  Insights: Decreased awareness of deficits  Initiation: Requires cues for some  Sequencing: Requires cues for some     Objective   Vitals  Vitals:    04/18/24    BP: (!) 151/95   Pulse: 77   Resp:    Temp:    SpO2: 98%          Bed Mobility Training  Bed Mobility Training: No  Interventions: Verbal cues;Safety awareness training  Supine to Sit: Other (comment) (sitting EOB when arrived in response to telesitter alarm)  Scooting: Supervision  Duration: 10  Balance  Sitting: Intact  Standing: Impaired  Standing - Static: Constant support;Good (RW)  Standing - Dynamic: Constant support;Fair (RW)  Transfer Training  Transfer Training: Yes  Interventions: Verbal cues;Visual cues;Safety awareness training  Sit to Stand: Contact-guard assistance;Additional time;Adaptive equipment (RW)  Stand to Sit: Contact-guard assistance;Additional time;Adaptive equipment (RW)  Gait  Gait Training: Yes  Overall Level of Assistance: Contact-guard assistance;Minimum assistance;Additional time  Distance (ft): 60 Feet (in room back and forth to sink and closet looking for her hearing aids and clothes)  Assistive Device: Gait belt;Walker, rolling  Interventions: Manual cues;Verbal cues;Safety awareness training  Speed/Nicoel: Slow  Step Length: Right shortened;Left shortened  Gait Abnormalities: Decreased step clearance;Path deviations;Trunk sway 
  BUN 16 15 15   CREATININE 1.1 1.0 0.9   CALCIUM 8.9 8.8 8.8   MG  --   --  1.70*   PHOS 2.2* 2.1* 2.0*     No results for input(s): \"PROBNP\", \"TROPHS\" in the last 72 hours.  No results for input(s): \"LABA1C\" in the last 72 hours.  No results for input(s): \"AST\", \"ALT\", \"BILIDIR\", \"BILITOT\", \"ALKPHOS\" in the last 72 hours.  No results for input(s): \"INR\", \"LACTA\", \"TSH\" in the last 72 hours.    Urine Cultures:   Lab Results   Component Value Date/Time    LABURIN No growth at 18 to 36 hours 03/31/2024 03:40 PM     Blood Cultures: No results found for: \"BC\"  No results found for: \"BLOODCULT2\"  Organism: No results found for: \"ORG\"      ARMANDO LOVELACE MD   
  Time Out       1014   Minutes       25   Timed Code Treatment Minutes: 15 Minutes (10 eval)     Amanda Durand, OT     
yes  Duration / Frequency of Treatment: 1-3x/wk    Therapeutic Interventions:  Compensatory strategy training and carryover, recall/STM, problem solving, reasoning, exec function, thought organization, attention.     Education:  Patient education: Role of SLP, Rationale of eval, Results of eval, Goals, and POC  Patient Education Responses: would benefit from ongoing education and no evidence of learning       Total Treatment Time / Charges       Time in Time out Total Time / units   Speech / Lang / Cog Eval:  0801  0823  22 min/ 1 unit     Signature:  Chelsie Davis M.A. CCC-SLP   Speech-Language Pathologist

## 2024-04-21 NOTE — PLAN OF CARE
Problem: Discharge Planning  Goal: Discharge to home or other facility with appropriate resources  4/21/2024 1018 by Jayleen Slater RN  Outcome: Progressing

## 2024-04-21 NOTE — PLAN OF CARE
Problem: Discharge Planning  Goal: Discharge to home or other facility with appropriate resources  4/21/2024 1018 by Jayleen Slater RN  Outcome: Adequate for Discharge  4/21/2024 1018 by Jayleen Slater RN  Outcome: Progressing  4/20/2024 2132 by Stu Nathan LPN  Outcome: Progressing  Flowsheets (Taken 4/20/2024 2130)  Discharge to home or other facility with appropriate resources:   Identify barriers to discharge with patient and caregiver   Arrange for needed discharge resources and transportation as appropriate   Identify discharge learning needs (meds, wound care, etc)     Problem: Safety - Adult  Goal: Free from fall injury  4/21/2024 1018 by Jayleen Slater RN  Outcome: Adequate for Discharge  4/20/2024 2132 by Stu Nathan LPN  Outcome: Progressing     Problem: Skin/Tissue Integrity  Goal: Absence of new skin breakdown  Description: 1.  Monitor for areas of redness and/or skin breakdown  2.  Assess vascular access sites hourly  3.  Every 4-6 hours minimum:  Change oxygen saturation probe site  4.  Every 4-6 hours:  If on nasal continuous positive airway pressure, respiratory therapy assess nares and determine need for appliance change or resting period.  4/21/2024 1018 by Jayleen Slater RN  Outcome: Adequate for Discharge  4/20/2024 2132 by Stu Nathan LPN  Outcome: Progressing     Problem: ABCDS Injury Assessment  Goal: Absence of physical injury  4/21/2024 1018 by Jayleen Slater RN  Outcome: Adequate for Discharge  4/20/2024 2132 by Stu Nathan LPN  Outcome: Progressing     Problem: Pain  Goal: Verbalizes/displays adequate comfort level or baseline comfort level  4/21/2024 1018 by Jayleen Slater RN  Outcome: Adequate for Discharge  Flowsheets (Taken 4/21/2024 0745)  Verbalizes/displays adequate comfort level or baseline comfort level: Assess pain using appropriate pain scale  4/20/2024 2132 by Stu Nathan LPN  Outcome: Progressing  Flowsheets (Taken 4/20/2024 1930)  Verbalizes/displays

## 2024-04-21 NOTE — CARE COORDINATION
CASE MANAGEMENT DISCHARGE SUMMARY      Discharge to: SNF @ [facility]    Precertification completed: N/A  Hospital Exemption Notification (HENS) completed: yes    Transportation:    Family/car: no   Medical Transport explained to pt/family. Pt/family voice no agency preference.    Agency used: [facility]   time: 1200   Ambulance form completed: Yes    Confirmed discharge plan with:     Patient: yes per RN     Family:  Randy Chao [phone]       Facility/Agency, name: [facility] faxed   Phone number for report to facility: [phone]     RN, name: Jayleen ZHOU    Note: Discharging nurse to complete WOODY, reconcile [facility]S, and place final copy with patient's discharge packet. RN to ensure that written prescriptions for  Level II medications are sent with patient to the facility as per protocol.

## 2024-04-21 NOTE — DISCHARGE SUMMARY
Hospital Medicine Discharge Summary    Patient: Maria E Valdez   : 8/3/1934     Admit Date: 2024   Discharge Date: 2024    Disposition:  []Home   []HHC  [x]SNF  []ECF  []Acute Rehab  []LTAC  []Hospice  Code status:  []Full  []DNR/CCA  []Limited (DNR/CCA with Do Not Intubate)  []DNRCC  Condition at Discharge: Stable  Primary Care Provider: Moises Winslow MD    Admitting Provider: Zechariah Lovelace MD  Discharge Provider: ZECHARIAH LOVELACE MD     Discharge Diagnoses:      Active Hospital Problems    Diagnosis     Acute encephalopathy [G93.40]     Cerebrovascular accident (CVA) (HCC) [I63.9]     Frequent falls [R29.6]     Controlled type 2 diabetes mellitus with hyperglycemia, with long-term current use of insulin (HCC) [E11.65, Z79.4]     Hypertension [I10]        Presenting Admission History:      89 year old female with a PMH significant for permanent atrial fibrillation on AC/eliquis, COPD, HFrEF, DM, HTN, and OA who presented to [facility] on 3/30/24 with dizziness and falls. Cardiology and Neurology were consulted. MRI brain revealed acute dorsal right jm CVA.   Urology was consulted due to urinary retention and pollack was placed.   She was then discharged from acute care side of [facility] and was then  admitted to [facility] acute inpatient rehab unit on 4/3/24 due to functional deficits below her baseline.         On 2024 she was transferred back from the [facility] to acute care.  Per report from the rehab unit she began to decline with her therapy late last week.  She was appearing more confused with decreased oral intake.  She did develop some acute kidney injury which improved with IV fluids and holding her Lasix.  There was concern for urinary tract infection she was started on antibiotics and did complete a course of Cipro for this.  She was to be discharged to SNF however there was concern about her increased confusion and decreased oral intake and increased weakness and that